# Patient Record
Sex: MALE | Race: WHITE | NOT HISPANIC OR LATINO | Employment: OTHER | URBAN - METROPOLITAN AREA
[De-identification: names, ages, dates, MRNs, and addresses within clinical notes are randomized per-mention and may not be internally consistent; named-entity substitution may affect disease eponyms.]

---

## 2017-10-30 ENCOUNTER — PREP FOR SURGERY (OUTPATIENT)
Dept: OTHER | Facility: HOSPITAL | Age: 62
End: 2017-10-30

## 2017-10-30 DIAGNOSIS — Z12.11 SCREENING FOR COLON CANCER: Primary | ICD-10-CM

## 2017-10-30 DIAGNOSIS — Z80.0 FAMILY HISTORY OF COLON CANCER: ICD-10-CM

## 2017-11-06 PROBLEM — Z80.0 FAMILY HISTORY OF COLON CANCER: Status: ACTIVE | Noted: 2017-11-06

## 2017-11-06 PROBLEM — Z12.11 SCREENING FOR COLON CANCER: Status: ACTIVE | Noted: 2017-11-06

## 2017-11-20 ENCOUNTER — HOSPITAL ENCOUNTER (OUTPATIENT)
Facility: HOSPITAL | Age: 62
Setting detail: HOSPITAL OUTPATIENT SURGERY
Discharge: HOME | End: 2017-11-20
Attending: SURGERY | Admitting: SURGERY

## 2017-11-20 ENCOUNTER — ANESTHESIA EVENT (OUTPATIENT)
Dept: GASTROENTEROLOGY | Facility: HOSPITAL | Age: 62
End: 2017-11-20

## 2017-11-20 ENCOUNTER — ANESTHESIA (OUTPATIENT)
Dept: GASTROENTEROLOGY | Facility: HOSPITAL | Age: 62
End: 2017-11-20

## 2017-11-20 VITALS
TEMPERATURE: 98.1 F | OXYGEN SATURATION: 96 % | DIASTOLIC BLOOD PRESSURE: 54 MMHG | RESPIRATION RATE: 16 BRPM | BODY MASS INDEX: 44.57 KG/M2 | HEIGHT: 67 IN | HEART RATE: 76 BPM | SYSTOLIC BLOOD PRESSURE: 116 MMHG | WEIGHT: 284 LBS

## 2017-11-20 DIAGNOSIS — Z12.11 SCREENING FOR COLON CANCER: ICD-10-CM

## 2017-11-20 DIAGNOSIS — Z80.0 FAMILY HISTORY OF COLON CANCER: ICD-10-CM

## 2017-11-20 PROCEDURE — 45380 COLONOSCOPY AND BIOPSY: CPT | Performed by: SURGERY

## 2017-11-20 PROCEDURE — 25010000002 PROPOFOL 10 MG/ML EMULSION: Performed by: ANESTHESIOLOGY

## 2017-11-20 PROCEDURE — S0260 H&P FOR SURGERY: HCPCS | Performed by: SURGERY

## 2017-11-20 PROCEDURE — 88305 TISSUE EXAM BY PATHOLOGIST: CPT | Performed by: SURGERY

## 2017-11-20 PROCEDURE — 25010000002 PROPOFOL 1000 MG/ML EMULSION: Performed by: ANESTHESIOLOGY

## 2017-11-20 RX ORDER — LIDOCAINE HYDROCHLORIDE 20 MG/ML
INJECTION, SOLUTION INFILTRATION; PERINEURAL AS NEEDED
Status: DISCONTINUED | OUTPATIENT
Start: 2017-11-20 | End: 2017-11-20 | Stop reason: SURG

## 2017-11-20 RX ORDER — AMLODIPINE BESYLATE 10 MG/1
10 TABLET ORAL DAILY
COMMUNITY
End: 2021-07-30 | Stop reason: SDUPTHER

## 2017-11-20 RX ORDER — SODIUM CHLORIDE, SODIUM LACTATE, POTASSIUM CHLORIDE, CALCIUM CHLORIDE 600; 310; 30; 20 MG/100ML; MG/100ML; MG/100ML; MG/100ML
30 INJECTION, SOLUTION INTRAVENOUS CONTINUOUS PRN
Status: DISCONTINUED | OUTPATIENT
Start: 2017-11-20 | End: 2017-11-20 | Stop reason: HOSPADM

## 2017-11-20 RX ORDER — SODIUM PHOSPHATE,MONO-DIBASIC 19G-7G/118
ENEMA (ML) RECTAL 2 TIMES DAILY WITH MEALS
COMMUNITY

## 2017-11-20 RX ORDER — DICLOFENAC SODIUM 75 MG/1
75 TABLET, DELAYED RELEASE ORAL 2 TIMES DAILY
COMMUNITY
End: 2021-07-07

## 2017-11-20 RX ORDER — PROPOFOL 10 MG/ML
VIAL (ML) INTRAVENOUS AS NEEDED
Status: DISCONTINUED | OUTPATIENT
Start: 2017-11-20 | End: 2017-11-20 | Stop reason: SURG

## 2017-11-20 RX ORDER — HYDROCHLOROTHIAZIDE 25 MG/1
25 TABLET ORAL DAILY
COMMUNITY
End: 2021-07-30 | Stop reason: SDUPTHER

## 2017-11-20 RX ADMIN — LIDOCAINE HYDROCHLORIDE 50 MG: 20 INJECTION, SOLUTION INFILTRATION; PERINEURAL at 12:22

## 2017-11-20 RX ADMIN — PROPOFOL 140 MCG/KG/MIN: 10 INJECTION, EMULSION INTRAVENOUS at 12:22

## 2017-11-20 RX ADMIN — SODIUM CHLORIDE, POTASSIUM CHLORIDE, SODIUM LACTATE AND CALCIUM CHLORIDE 30 ML/HR: 600; 310; 30; 20 INJECTION, SOLUTION INTRAVENOUS at 11:49

## 2017-11-20 RX ADMIN — PROPOFOL 180 MG: 10 INJECTION, EMULSION INTRAVENOUS at 12:22

## 2017-11-20 NOTE — PLAN OF CARE
Problem: Patient Care Overview (Adult)  Goal: Plan of Care Review  Outcome: Ongoing (interventions implemented as appropriate)    11/20/17 1130   Coping/Psychosocial Response Interventions   Plan Of Care Reviewed With patient   Patient Care Overview   Progress no change       Goal: Adult Individualization and Mutuality  Outcome: Ongoing (interventions implemented as appropriate)  Goal: Discharge Needs Assessment  Outcome: Ongoing (interventions implemented as appropriate)    11/20/17 1130   Discharge Needs Assessment   Concerns To Be Addressed no discharge needs identified   Discharge Disposition home or self-care   Living Environment   Transportation Available car         Problem: GI Endoscopy (Adult)  Goal: Signs and Symptoms of Listed Potential Problems Will be Absent or Manageable (GI Endoscopy)  Outcome: Ongoing (interventions implemented as appropriate)    11/20/17 1130   GI Endoscopy   Problems Assessed (GI Endoscopy) all   Problems Present (GI Endoscopy) none

## 2017-11-20 NOTE — OP NOTE
Operative Note :  Bridgette Jefferson MD      Andrea Diaz  1955    Procedure Date: 11/20/17    Pre-op Diagnosis:  · Family history of colon cancer [Z80.0]  · Screening for colon cancer [Z12.11]    Post-Operative Diagnosis:  · Rectal polyp  · Grade 1 internal hemorrhoids  · Family history of colon cancer [Z80.0]  · Screening for colon cancer [Z12.11]    Procedure:   · Flexible colonoscopy to the cecum with cold forcep polypectomy    Surgeon: Bridgette Jefferson MD    Assistant: None    Anesthesia:  MAC (monitored anesthetic care)    Estimated Blood Loss: Minimal    Specimens: Rectal polyp    Complications: None    Indications:  · Mr. Diaz is a 62-year-old gentleman here for a repeat screening colonoscopy.  His last colonoscopy was 10 years ago and within normal limits, but he does have a family history of colon cancer in his paternal uncle.  He has been counseled on the risks of the procedure to include bleeding from polypectomy site, abdominal discomfort following the procedure, and possible colon perforation.  Despite these risks, he has consented to proceed.    Findings:   · Grade I internal hemorrhoids, rectal polyp    Description of procedure:  The patient was brought to the endoscopy suite and cielo in the left lateral decubitus position.  Continuous propofol anesthesia was administered.  A surgical timeout was completed.  A digital rectal exam was performed, revealing no abnormalities.  An adult colonoscope was then inserted through the anus and passed under direct visualization to the level of the cecum.  The cecum was identified via the ileocecal valve as well as the appendiceal orifice.  The scope was then slowly withdrawn, examining all circumferential walls of the ascending, transverse, descending, and sigmoid colon.  There were no signs of diverticulosis or stricture.  There was a single sessile polyp measuring 5 mm located within the upper portion of the rectum at 25 cm from anal verge.  This was  removed with the cold biopsy forceps.  Within the rectum, the scope was retroflexed revealing grade 1 internal hemorrhoids.  The scope was then withdrawn and the colon desufflated.  The patient had a very good bowel prep and was transferred to the recovery area in stable condition. I will call him in 1 week with the pathology results of the single polyp removed, as this will dictate the appropriate timing for his next screening colonoscopy.    Bridgette Jefferson MD  General and Endoscopic Surgery  [facility]    [address], [address]  [place], [place], [address]  P: [phone]  F: [phone]

## 2017-11-20 NOTE — ANESTHESIA POSTPROCEDURE EVALUATION
"Patient: Andrea Diaz    Procedure Summary     Date Anesthesia Start Anesthesia Stop Room / Location    11/20/17 1205 1241  [facility] /  [facility]       Procedure Diagnosis Surgeon Provider    COLONOSCOPY TO CECUM WITH COLD POLYPECTOMY (N/A ) Family history of colon cancer; Screening for colon cancer  (Family history of colon cancer [Z80.0]; Screening for colon cancer [Z12.11]) MD Lilo Marte MD          Anesthesia Type: MAC  Last vitals  BP   120/77 (11/20/17 1243)   Temp   36.6 °C (97.8 °F) (11/20/17 1146)   Pulse   90 (11/20/17 1243)   Resp   16 (11/20/17 1243)     SpO2   99 % (11/20/17 1243)     Post Anesthesia Care and Evaluation    Patient location during evaluation: bedside  Patient participation: complete - patient participated  Level of consciousness: awake and alert  Pain management: adequate  Airway patency: patent  Anesthetic complications: No anesthetic complications  PONV Status: none  Cardiovascular status: acceptable  Respiratory status: acceptable  Hydration status: acceptable    Comments: /77 (BP Location: Left arm, Patient Position: Lying)  Pulse 90  Temp 36.6 °C (97.8 °F) (Oral)   Resp 16  Ht 67\" (170.2 cm)  Wt 284 lb (129 kg)  SpO2 99%  BMI 44.48 kg/m2        "

## 2017-11-20 NOTE — H&P
General Surgery  History and Physical    CC: Screening for colon cancer, family history of colon cancer    HPI: The patient is a pleasant 62 y.o. year-old gentleman who presents today for a repeat screening colonoscopy.  His last colonoscopy was in 2007 by Dr. Dewitt, and within normal limits.  He has a paternal uncle with colon cancer diagnosed in his early 50's and a father with pancreatic cancer.  He denies any melena, hematochezia, or unintentional weight loss.    Past Medical History: Hypertension, arthritis    Past Surgical History: Bilateral knee replacements, colonoscopy (2007)    Medications:   Hydrochlorthiazide 25 mg daily  Amlodipine 10 mg daily  Diclofenac 75 g twice daily  Loratadine 10 mg daily  Glucosamine with chondroitin twice daily  Vitamin C 500 mg daily    Allergies: No known drug allergies    Family History: Father with pancreatic cancer, paternal uncle with colon cancer (diagnosed in early 50's), brother with history of colon polyps    Social History: , works as an  for [employer], nonsmoker, no alcohol use    ROS: A comprehensive review of systems was conducted and negative for melena, hematochezia, or unintentional weight loss.  All other systems reviewed and negative    Physical Exam:  Vitals:    11/20/17 1146   BP: 129/96   Pulse: 88   Resp: 12   Temp: 97.8 °F (36.6 °C)   SpO2: 95%     General: No acute distress, well-nourished & well-developed  HEAD: normocephalic, atraumatic  EYES: normal conjunctiva, sclera anicteric  EARS: grossly normal hearing  NECK: supple, no thyromegaly  CARDIOVASCULAR: regular rate and rhythm  RESPIRATORY: clear to auscultation bilaterally  GASTROINTESTINAL: soft, nontender, non-distended  PSYCHIATRIC: oriented x3, normal mood and affect    ASSESSMENT & PLAN  Mr. Diaz is a 62-year-old gentleman here for a repeat screening colonoscopy.  His last colonoscopy was 10 years ago and within normal limits, but he does have a family history  of colon cancer in his paternal uncle.  He has been counseled on the risks of the procedure to include bleeding from polypectomy site, abdominal discomfort following the procedure, and possible colon perforation.  Despite these risks, he has consented to proceed.    Bridgette Jefferson MD  General and Endoscopic Surgery  [facility]    [address], [address]  [place], [place], [address]  P: [phone]  F: [phone]

## 2017-11-20 NOTE — ANESTHESIA PREPROCEDURE EVALUATION
Anesthesia Evaluation     Patient summary reviewed   NPO Solid Status: > 8 hours  NPO Liquid Status: > 6 hours     Airway   Mallampati: II  TM distance: >3 FB  Dental      Pulmonary    Cardiovascular     Rhythm: regular  Rate: normal    (+) hypertension,       Neuro/Psych  GI/Hepatic/Renal/Endo    (+) morbid obesity,     Musculoskeletal     Abdominal    Substance History      OB/GYN          Other                                        Anesthesia Plan    ASA 3     MAC   total IV anesthesia  Anesthetic plan and risks discussed with patient.

## 2017-11-21 LAB
CYTO UR: NORMAL
LAB AP CASE REPORT: NORMAL
Lab: NORMAL
PATH REPORT.FINAL DX SPEC: NORMAL
PATH REPORT.GROSS SPEC: NORMAL

## 2017-11-22 ENCOUNTER — TELEPHONE (OUTPATIENT)
Dept: SURGERY | Facility: CLINIC | Age: 62
End: 2017-11-22

## 2017-11-22 NOTE — TELEPHONE ENCOUNTER
I called and relayed the pathology findings of a single hyperplastic polyp on colonoscopy. His next screening colonoscopy will be due in 10 years. He expressed understanding. Marilu, can you update the Health Maintenance tab and recall pool? Thanks.    Bridgette Jefferson MD  General and Endoscopic Surgery  [facility]    [address], [address]  [place], [place], [address]  P: [phone]  F: [phone]

## 2018-01-06 ENCOUNTER — OFFICE VISIT CONVERTED (OUTPATIENT)
Dept: FAMILY MEDICINE CLINIC | Age: 63
End: 2018-01-06
Attending: NURSE PRACTITIONER

## 2018-03-20 ENCOUNTER — OFFICE VISIT CONVERTED (OUTPATIENT)
Dept: FAMILY MEDICINE CLINIC | Age: 63
End: 2018-03-20
Attending: NURSE PRACTITIONER

## 2018-06-21 ENCOUNTER — OFFICE VISIT CONVERTED (OUTPATIENT)
Dept: FAMILY MEDICINE CLINIC | Age: 63
End: 2018-06-21
Attending: FAMILY MEDICINE

## 2018-07-10 ENCOUNTER — OFFICE VISIT CONVERTED (OUTPATIENT)
Dept: FAMILY MEDICINE CLINIC | Age: 63
End: 2018-07-10
Attending: NURSE PRACTITIONER

## 2018-08-21 ENCOUNTER — OFFICE VISIT CONVERTED (OUTPATIENT)
Dept: FAMILY MEDICINE CLINIC | Age: 63
End: 2018-08-21
Attending: NURSE PRACTITIONER

## 2018-10-26 ENCOUNTER — OFFICE VISIT CONVERTED (OUTPATIENT)
Dept: FAMILY MEDICINE CLINIC | Age: 63
End: 2018-10-26
Attending: FAMILY MEDICINE

## 2019-02-02 ENCOUNTER — HOSPITAL ENCOUNTER (OUTPATIENT)
Dept: OTHER | Facility: HOSPITAL | Age: 64
Discharge: HOME | End: 2019-02-02
Attending: FAMILY MEDICINE

## 2019-02-02 LAB
ALBUMIN SERPL-MCNC: 4.1 G/DL (ref 3.5–5)
ALBUMIN/GLOB SERPL: 1.2 {RATIO} (ref 1.4–2.6)
ALP SERPL-CCNC: 76 U/L (ref 56–155)
ALT SERPL-CCNC: 32 U/L (ref 10–40)
ANION GAP SERPL CALC-SCNC: 20 MMOL/L (ref 8–19)
AST SERPL-CCNC: 20 U/L (ref 15–50)
BILIRUB SERPL-MCNC: 0.61 MG/DL (ref 0.2–1.3)
BUN SERPL-MCNC: 18 MG/DL (ref 5–25)
BUN/CREAT SERPL: 17 {RATIO} (ref 6–20)
CALCIUM SERPL-MCNC: 9.3 MG/DL (ref 8.7–10.4)
CHLORIDE SERPL-SCNC: 103 MMOL/L (ref 99–111)
CHOLEST SERPL-MCNC: 129 MG/DL (ref 107–200)
CHOLEST/HDLC SERPL: 3.7 {RATIO} (ref 3–6)
CONV CO2: 22 MMOL/L (ref 22–32)
CONV CREATININE URINE, RANDOM: 99.9 MG/DL (ref 10–300)
CONV MICROALBUM.,U,RANDOM: <12 MG/L (ref 0–20)
CONV TOTAL PROTEIN: 7.4 G/DL (ref 6.3–8.2)
CREAT UR-MCNC: 1.04 MG/DL (ref 0.7–1.2)
ERYTHROCYTE [DISTWIDTH] IN BLOOD BY AUTOMATED COUNT: 11.6 % (ref 11.5–14.5)
EST. AVERAGE GLUCOSE BLD GHB EST-MCNC: 131 MG/DL
GFR SERPLBLD BASED ON 1.73 SQ M-ARVRAT: >60 ML/MIN/{1.73_M2}
GLOBULIN UR ELPH-MCNC: 3.3 G/DL (ref 2–3.5)
GLUCOSE SERPL-MCNC: 120 MG/DL (ref 70–99)
HBA1C MFR BLD: 14.7 G/DL (ref 14–18)
HBA1C MFR BLD: 6.2 % (ref 3.5–5.7)
HCT VFR BLD AUTO: 41.5 % (ref 42–52)
HDLC SERPL-MCNC: 35 MG/DL (ref 40–60)
LDLC SERPL CALC-MCNC: 82 MG/DL (ref 70–100)
MCH RBC QN AUTO: 30.8 PG (ref 27–31)
MCHC RBC AUTO-ENTMCNC: 35.4 G/DL (ref 33–37)
MCV RBC AUTO: 86.8 FL (ref 80–96)
MICROALBUMIN/CREAT UR: 12 MG/G{CRE} (ref 0–25)
OSMOLALITY SERPL CALC.SUM OF ELEC: 295 MOSM/KG (ref 273–304)
PLATELET # BLD AUTO: 215 10*3/UL (ref 130–400)
PMV BLD AUTO: 10.2 FL (ref 7.4–10.4)
POTASSIUM SERPL-SCNC: 3.9 MMOL/L (ref 3.5–5.3)
PSA SERPL-MCNC: 0.43 NG/ML (ref 0–4)
RBC # BLD AUTO: 4.78 10*6/UL (ref 4.7–6.1)
SODIUM SERPL-SCNC: 141 MMOL/L (ref 135–147)
TRIGL SERPL-MCNC: 62 MG/DL (ref 40–150)
TSH SERPL-ACNC: 1.63 M[IU]/L (ref 0.27–4.2)
VLDLC SERPL-MCNC: 12 MG/DL (ref 5–37)
WBC # BLD AUTO: 4.88 10*3/UL (ref 4.8–10.8)

## 2019-02-19 ENCOUNTER — OFFICE VISIT CONVERTED (OUTPATIENT)
Dept: FAMILY MEDICINE CLINIC | Age: 64
End: 2019-02-19
Attending: FAMILY MEDICINE

## 2019-10-01 ENCOUNTER — HOSPITAL ENCOUNTER (OUTPATIENT)
Dept: OTHER | Facility: HOSPITAL | Age: 64
Discharge: HOME | End: 2019-10-01
Attending: FAMILY MEDICINE

## 2019-10-01 ENCOUNTER — OFFICE VISIT CONVERTED (OUTPATIENT)
Dept: FAMILY MEDICINE CLINIC | Age: 64
End: 2019-10-01
Attending: FAMILY MEDICINE

## 2019-10-01 LAB
ALBUMIN SERPL-MCNC: 4.2 G/DL (ref 3.5–5)
ALBUMIN/GLOB SERPL: 1.3 {RATIO} (ref 1.4–2.6)
ALP SERPL-CCNC: 78 U/L (ref 56–155)
ALT SERPL-CCNC: 32 U/L (ref 10–40)
ANION GAP SERPL CALC-SCNC: 20 MMOL/L (ref 8–19)
AST SERPL-CCNC: 21 U/L (ref 15–50)
BILIRUB SERPL-MCNC: 0.32 MG/DL (ref 0.2–1.3)
BUN SERPL-MCNC: 24 MG/DL (ref 5–25)
BUN/CREAT SERPL: 19 {RATIO} (ref 6–20)
CALCIUM SERPL-MCNC: 9.5 MG/DL (ref 8.7–10.4)
CHLORIDE SERPL-SCNC: 101 MMOL/L (ref 99–111)
CONV CO2: 22 MMOL/L (ref 22–32)
CONV TOTAL PROTEIN: 7.4 G/DL (ref 6.3–8.2)
CREAT UR-MCNC: 1.28 MG/DL (ref 0.7–1.2)
EST. AVERAGE GLUCOSE BLD GHB EST-MCNC: 146 MG/DL
GFR SERPLBLD BASED ON 1.73 SQ M-ARVRAT: 58 ML/MIN/{1.73_M2}
GLOBULIN UR ELPH-MCNC: 3.2 G/DL (ref 2–3.5)
GLUCOSE SERPL-MCNC: 123 MG/DL (ref 70–99)
HBA1C MFR BLD: 6.7 % (ref 3.5–5.7)
OSMOLALITY SERPL CALC.SUM OF ELEC: 293 MOSM/KG (ref 273–304)
POTASSIUM SERPL-SCNC: 3.8 MMOL/L (ref 3.5–5.3)
SODIUM SERPL-SCNC: 139 MMOL/L (ref 135–147)

## 2020-01-06 ENCOUNTER — HOSPITAL ENCOUNTER (OUTPATIENT)
Dept: OTHER | Facility: HOSPITAL | Age: 65
Discharge: HOME | End: 2020-01-06
Attending: FAMILY MEDICINE

## 2020-01-06 LAB
ANION GAP SERPL CALC-SCNC: 17 MMOL/L (ref 8–19)
BUN SERPL-MCNC: 18 MG/DL (ref 5–25)
BUN/CREAT SERPL: 16 {RATIO} (ref 6–20)
CALCIUM SERPL-MCNC: 9 MG/DL (ref 8.7–10.4)
CHLORIDE SERPL-SCNC: 101 MMOL/L (ref 99–111)
CONV CO2: 22 MMOL/L (ref 22–32)
CREAT UR-MCNC: 1.13 MG/DL (ref 0.7–1.2)
EST. AVERAGE GLUCOSE BLD GHB EST-MCNC: 148 MG/DL
GFR SERPLBLD BASED ON 1.73 SQ M-ARVRAT: >60 ML/MIN/{1.73_M2}
GLUCOSE SERPL-MCNC: 128 MG/DL (ref 70–99)
HBA1C MFR BLD: 6.8 % (ref 3.5–5.7)
OSMOLALITY SERPL CALC.SUM OF ELEC: 286 MOSM/KG (ref 273–304)
POTASSIUM SERPL-SCNC: 3.9 MMOL/L (ref 3.5–5.3)
SODIUM SERPL-SCNC: 136 MMOL/L (ref 135–147)

## 2020-04-14 ENCOUNTER — OFFICE VISIT CONVERTED (OUTPATIENT)
Dept: FAMILY MEDICINE CLINIC | Age: 65
End: 2020-04-14
Attending: FAMILY MEDICINE

## 2020-06-10 ENCOUNTER — HOSPITAL ENCOUNTER (OUTPATIENT)
Dept: OTHER | Facility: HOSPITAL | Age: 65
Discharge: HOME | End: 2020-06-10
Attending: FAMILY MEDICINE

## 2020-06-10 LAB
ALBUMIN SERPL-MCNC: 3.9 G/DL (ref 3.5–5)
ALBUMIN/GLOB SERPL: 1.3 {RATIO} (ref 1.4–2.6)
ALP SERPL-CCNC: 70 U/L (ref 56–155)
ALT SERPL-CCNC: 23 U/L (ref 10–40)
ANION GAP SERPL CALC-SCNC: 21 MMOL/L (ref 8–19)
AST SERPL-CCNC: 16 U/L (ref 15–50)
BILIRUB SERPL-MCNC: 0.53 MG/DL (ref 0.2–1.3)
BUN SERPL-MCNC: 20 MG/DL (ref 5–25)
BUN/CREAT SERPL: 18 {RATIO} (ref 6–20)
CALCIUM SERPL-MCNC: 9.1 MG/DL (ref 8.7–10.4)
CHLORIDE SERPL-SCNC: 103 MMOL/L (ref 99–111)
CHOLEST SERPL-MCNC: 116 MG/DL (ref 107–200)
CHOLEST/HDLC SERPL: 3.4 {RATIO} (ref 3–6)
CONV CO2: 18 MMOL/L (ref 22–32)
CONV TOTAL PROTEIN: 7 G/DL (ref 6.3–8.2)
CREAT UR-MCNC: 1.14 MG/DL (ref 0.7–1.2)
ERYTHROCYTE [DISTWIDTH] IN BLOOD BY AUTOMATED COUNT: 12 % (ref 11.5–14.5)
GFR SERPLBLD BASED ON 1.73 SQ M-ARVRAT: >60 ML/MIN/{1.73_M2}
GLOBULIN UR ELPH-MCNC: 3.1 G/DL (ref 2–3.5)
GLUCOSE SERPL-MCNC: 222 MG/DL (ref 70–99)
HBA1C MFR BLD: 14.5 G/DL (ref 14–18)
HCT VFR BLD AUTO: 41.1 % (ref 42–52)
HDLC SERPL-MCNC: 34 MG/DL (ref 40–60)
LDLC SERPL CALC-MCNC: 57 MG/DL (ref 70–100)
MCH RBC QN AUTO: 30.1 PG (ref 27–31)
MCHC RBC AUTO-ENTMCNC: 35.3 G/DL (ref 33–37)
MCV RBC AUTO: 85.4 FL (ref 80–96)
OSMOLALITY SERPL CALC.SUM OF ELEC: 295 MOSM/KG (ref 273–304)
PLATELET # BLD AUTO: 214 10*3/UL (ref 130–400)
PMV BLD AUTO: 10.4 FL (ref 7.4–10.4)
POTASSIUM SERPL-SCNC: 3.7 MMOL/L (ref 3.5–5.3)
PSA SERPL-MCNC: 0.5 NG/ML (ref 0–4)
RBC # BLD AUTO: 4.81 10*6/UL (ref 4.7–6.1)
SODIUM SERPL-SCNC: 138 MMOL/L (ref 135–147)
TRIGL SERPL-MCNC: 124 MG/DL (ref 40–150)
VLDLC SERPL-MCNC: 25 MG/DL (ref 5–37)
WBC # BLD AUTO: 5.46 10*3/UL (ref 4.8–10.8)

## 2020-06-11 LAB
EST. AVERAGE GLUCOSE BLD GHB EST-MCNC: 166 MG/DL
HBA1C MFR BLD: 7.4 % (ref 3.5–5.7)

## 2020-06-15 ENCOUNTER — OFFICE VISIT CONVERTED (OUTPATIENT)
Dept: FAMILY MEDICINE CLINIC | Age: 65
End: 2020-06-15
Attending: FAMILY MEDICINE

## 2021-01-05 ENCOUNTER — OFFICE VISIT CONVERTED (OUTPATIENT)
Dept: FAMILY MEDICINE CLINIC | Age: 66
End: 2021-01-05
Attending: FAMILY MEDICINE

## 2021-01-05 ENCOUNTER — HOSPITAL ENCOUNTER (OUTPATIENT)
Dept: OTHER | Facility: HOSPITAL | Age: 66
Discharge: HOME | End: 2021-01-05
Attending: FAMILY MEDICINE

## 2021-01-05 LAB
ALBUMIN SERPL-MCNC: 4.2 G/DL (ref 3.5–5)
ALBUMIN/GLOB SERPL: 1.2 {RATIO} (ref 1.4–2.6)
ALP SERPL-CCNC: 74 U/L (ref 56–155)
ALT SERPL-CCNC: 28 U/L (ref 10–40)
ANION GAP SERPL CALC-SCNC: 15 MMOL/L (ref 8–19)
AST SERPL-CCNC: 17 U/L (ref 15–50)
BILIRUB SERPL-MCNC: 0.66 MG/DL (ref 0.2–1.3)
BUN SERPL-MCNC: 21 MG/DL (ref 5–25)
BUN/CREAT SERPL: 19 {RATIO} (ref 6–20)
CALCIUM SERPL-MCNC: 9.5 MG/DL (ref 8.7–10.4)
CHLORIDE SERPL-SCNC: 98 MMOL/L (ref 99–111)
CHOLEST SERPL-MCNC: 136 MG/DL (ref 107–200)
CHOLEST/HDLC SERPL: 3.7 {RATIO} (ref 3–6)
CONV CO2: 26 MMOL/L (ref 22–32)
CONV CREATININE URINE, RANDOM: 67.3 MG/DL (ref 10–300)
CONV MICROALBUM.,U,RANDOM: <12 MG/L (ref 0–20)
CONV TOTAL PROTEIN: 7.6 G/DL (ref 6.3–8.2)
CREAT UR-MCNC: 1.12 MG/DL (ref 0.7–1.2)
ERYTHROCYTE [DISTWIDTH] IN BLOOD BY AUTOMATED COUNT: 11.8 % (ref 11.5–14.5)
EST. AVERAGE GLUCOSE BLD GHB EST-MCNC: 143 MG/DL
GFR SERPLBLD BASED ON 1.73 SQ M-ARVRAT: >60 ML/MIN/{1.73_M2}
GLOBULIN UR ELPH-MCNC: 3.4 G/DL (ref 2–3.5)
GLUCOSE SERPL-MCNC: 137 MG/DL (ref 70–99)
HBA1C MFR BLD: 15 G/DL (ref 14–18)
HBA1C MFR BLD: 6.6 % (ref 3.5–5.7)
HCT VFR BLD AUTO: 42.8 % (ref 42–52)
HDLC SERPL-MCNC: 37 MG/DL (ref 40–60)
LDLC SERPL CALC-MCNC: 75 MG/DL (ref 70–100)
MCH RBC QN AUTO: 30.3 PG (ref 27–31)
MCHC RBC AUTO-ENTMCNC: 35 G/DL (ref 33–37)
MCV RBC AUTO: 86.5 FL (ref 80–96)
MICROALBUMIN/CREAT UR: 17.8 MG/G{CRE} (ref 0–25)
OSMOLALITY SERPL CALC.SUM OF ELEC: 285 MOSM/KG (ref 273–304)
PLATELET # BLD AUTO: 265 10*3/UL (ref 130–400)
PMV BLD AUTO: 10.4 FL (ref 7.4–10.4)
POTASSIUM SERPL-SCNC: 4.4 MMOL/L (ref 3.5–5.3)
RBC # BLD AUTO: 4.95 10*6/UL (ref 4.7–6.1)
SODIUM SERPL-SCNC: 135 MMOL/L (ref 135–147)
TRIGL SERPL-MCNC: 121 MG/DL (ref 40–150)
VLDLC SERPL-MCNC: 24 MG/DL (ref 5–37)
WBC # BLD AUTO: 6.88 10*3/UL (ref 4.8–10.8)

## 2021-05-18 NOTE — PROGRESS NOTES
Andrea Diaz  1955     Office/Outpatient Visit    Visit Date: Tue, Apr 14, 2020 09:22 am    Provider: Velasquez Daniel MD (Assistant: Jacqueline Singh MA)    Location: [facility]        Electronically signed by Velasquez Daniel MD on  04/15/2020 11:06:40 AM                             Subjective:        CC: blood pressure, cholesterol, arthritis        TELEMEDICINE VISIT:    - Marcelino consented to this telemedicine visit.    - Persons present during the telemedicine consultation include:  Marcelino - patient, Dr. Daniel    - This visit is being conducted over Doximity  with audio and video.    HPI:           Patient presents with essential (primary) hypertension.  His current cardiac medication regimen includes a diuretic ( Hydrodiuril ) and a calcium channel blocker ( Norvasc ).  Marcelino does not check his blood pressure other than at his clinic appointments.  He is tolerating the medication well without side effects.            Marcelino also has a history of arthritis for which he takes diclofenac.  He does see benefit from this and has not had worrisome side effects of which he is aware.          With regard to the pure hypercholesterolemia, unspecified, current treatment includes Lipitor.  Compliance with treatment has been good; he takes his medication as directed and follows up as directed.  He denies experiencing any hypercholesterolemia related symptoms.      ROS:     CONSTITUTIONAL:  Negative for chills and fever.      CARDIOVASCULAR:  Negative for chest pain and palpitations.      RESPIRATORY:  Negative for recent cough and dyspnea.      GASTROINTESTINAL:  Negative for abdominal pain, nausea and vomiting.      INTEGUMENTARY:  Negative for atypical mole(s) and rash.          Past Medical History / Family History / Social History:         Last Reviewed on 4/14/2020 09:58 AM by Velasquez Daniel    Past Medical History:             PAST MEDICAL HISTORY         Hypertension      Chronic Pain: affecting the knees;     Osteoarthritis: primarily affecting the knees;             ADVANCED DIRECTIVES: None - His children would make decisions if needed.         PREVENTIVE HEALTH MAINTENANCE             COLORECTAL CANCER SCREENING: Up to date (colonoscopy q10y; sigmoidoscopy q5y; Cologuard q3y) was last done 11/2017, Results are in chart; colonoscopy with normal results; hyperplastic polyp         Surgical History:         Joint Replacement: R Knee - August 2012 - Miles Leland;     R Knee - Meniscus;    L Knee arthroplasty 2-2015;         Family History:         Positive for Coronary Artery Disease ( mother ).      Positive for Colon Cancer ( uncle ) and Pancreatric Cancer ( father ).      Positive for Type 2 Diabetes ( father ).          Social History:     Occupation:    Retired     Marital Status:      Children: 2 children         Tobacco/Alcohol/Supplements:     Last Reviewed on 4/14/2020 09:58 AM by Velasquez Daniel    Tobacco: He has never smoked.  Non-drinker         Substance Abuse History:     Last Reviewed on 4/14/2020 09:58 AM by Velasquez Daniel        Mental Health History:     Last Reviewed on 4/14/2020 09:58 AM by Velasquez Daniel        Communicable Diseases (eg STDs):     Last Reviewed on 4/14/2020 09:58 AM by Velasquez Daniel        Current Problems:     Last Reviewed on 4/14/2020 09:58 AM by Velasquez Daniel    Essential (primary) hypertension    Unilateral primary osteoarthritis, left knee    Primary generalized (osteo)arthritis    Type 2 diabetes mellitus without complications    Pure hypercholesterolemia, unspecified    Encounter for screening for malignant neoplasm of prostate        Immunizations:     Havrix -adult dose (HepA) 6/11/2018    Havrix -adult dose (HepA) 1/2/2019    Prevnar 13 (Pneumococcal PCV 13) 10/26/2018    Fluzone High-Dose pf (>=65 yr) 10/26/2018    Fluzone High-Dose pf (>=65 yr) 10/1/2019    PNEUMOVAX 23  (Pneumococcal PPV23) 10/1/2019    Shingrix (Zoster recombinant) 6/9/2018    Shingrix (Zoster recombinant) 8/29/2018    Zostavax (Zoster live) 10/9/2013        Allergies:     Last Reviewed on 4/14/2020 09:58 AM by Velasquez Daniel    No Known Allergies.        Current Medications:     Last Reviewed on 4/14/2020 09:58 AM by Velasquez Daniel    Loratadine 10 mg oral tablet [1 tab daily]    Diclofenac Sodium 75 mg oral tablet, delayed release (enteric coated) [Take 1 tablet(s) by mouth bid with food]    Amlodipine  10 mg oral tablet [Take 1 tablet(s) by mouth daily]    hydroCHLOROthiazide 25 mg oral tablet [Take 1 tablet(s) by mouth daily]    glucosamine HCl 1,500 mg oral tablet [Take 1 tablet(s) by mouth daily]    Ibuprofen prn     Vitamin C 500 mg oral tablet [1 tab daily]    atorvastatin 10 mg oral tablet [TAKE 1 TABLET BY MOUTH DAILY]        Objective:        Exams:     PHYSICAL EXAM:     GENERAL: Vitals recorded well developed, obese;     EYES: extraocular movements intact; conjunctiva and cornea are normal;     RESPIRATORY: normal respiratory rate and pattern with no distress;     NEUROLOGIC: mental status: alert and oriented x 3;     PSYCHIATRIC: appropriate affect and demeanor; normal psychomotor function;         Assessment:         I10   Essential (primary) hypertension       M15.0   Primary generalized (osteo)arthritis       E78.00   Pure hypercholesterolemia, unspecified       Z12.5   Encounter for screening for malignant neoplasm of prostate           ORDERS:         Meds Prescribed:       [Refilled] Amlodipine  10 mg oral tablet [Take 1 tablet(s) by mouth daily], #90 (ninety) tablets, Refills: 1 (one)       [Refilled] Diclofenac Sodium 75 mg oral tablet, delayed release (enteric coated) [Take 1 tablet(s) by mouth bid with food], #180 (one hundred and eighty) tablets, Refills: 1 (one)       [Refilled] hydroCHLOROthiazide 25 mg oral tablet [Take 1 tablet(s) by mouth daily], #90 (ninety) tablets,  Refills: 1 (one)       [Refilled] atorvastatin 10 mg oral tablet [take 1 tablet (10 mg) by oral route once daily], #90 (ninety) tablets, Refills: 1 (one)       [New Rx] famotidine 20 mg oral tablet [take 1 tablet (20 mg) by oral route once daily at bedtime], #90 (ninety) tablets, Refills: 1 (one)         Radiology/Test Orders:       3017F  Colorectal CA screen results documented and reviewed (PV)  (In-House)              Lab Orders:       [id number]  HTNLP - [facility] CMP AND LIPID: [id number], [id number]  (Send-Out)            27555  PRSAS - [facility] PSA Screen or Medicare screening order:   (Send-Out)            [id number]  BDCB2 - [facility] CBC w/o diff  (Send-Out)              Other Orders:       1101F  Pt screen for fall risk; document no falls in past year or only 1 fall w/o injury in past year (PRINCESS)  (In-House)            1124F  Advance Care Planning discussed and doc in MR; no surrogate named or advance care plan provided  (Send-Out)                      Plan:         Essential (primary) hypertension        RECOMMENDATIONS given include: Marcelino looks well today.  I'm going to refill his medications as noted below.  No changes are anticipated.  We are going to put him on some Pepcid at bedtime.  We will call him in about 6 weeks for labs..  MIRNA Has had no falls or only one fall without injury in the past year Vaccines Flu and Pneumonia updated in Shot record Colorectal Cancer Screening is up to date and the results are in the chart Advance Directive/Surrogate Decision Maker discussed and pt declines to complete today           Orders:       1101F  Pt screen for fall risk; document no falls in past year or only 1 fall w/o injury in past year (PRINCESS)  (In-House)            3017F  Colorectal CA screen results documented and reviewed (PV)  (In-House)            1124F  Advance Care Planning discussed and doc in MR; no surrogate named or advance care plan provided  (Send-Out)              Primary generalized (osteo)arthritis    LABORATORY:  Labs  ordered to be performed today include CBC W/O DIFF.            Prescriptions:       [Refilled] Amlodipine  10 mg oral tablet [Take 1 tablet(s) by mouth daily], #90 (ninety) tablets, Refills: 1 (one)       [Refilled] Diclofenac Sodium 75 mg oral tablet, delayed release (enteric coated) [Take 1 tablet(s) by mouth bid with food], #180 (one hundred and eighty) tablets, Refills: 1 (one)       [Refilled] hydroCHLOROthiazide 25 mg oral tablet [Take 1 tablet(s) by mouth daily], #90 (ninety) tablets, Refills: 1 (one)       [Refilled] atorvastatin 10 mg oral tablet [take 1 tablet (10 mg) by oral route once daily], #90 (ninety) tablets, Refills: 1 (one)       [New Rx] famotidine 20 mg oral tablet [take 1 tablet (20 mg) by oral route once daily at bedtime], #90 (ninety) tablets, Refills: 1 (one)           Orders:       [id number]  CB - [facility] CBC w/o diff  (Send-Out)              Pure hypercholesterolemia, unspecified    LABORATORY:  Labs ordered to be performed today include HTN/Lipid Panel: CMP, Lipid.            Orders:       [id number]  [facility] [id number], [id number]  (Send-Out)              Encounter for screening for malignant neoplasm of prostate          Orders:       [id number]  [facility] PSA Screen or Medicare screening order:   (Send-Out)                  Charge Capture:         Primary Diagnosis:     I10  Essential (primary) hypertension           Orders:      [id number]  Office/outpatient visit; established patient, level 4  (In-House)            1101F  Pt screen for fall risk; document no falls in past year or only 1 fall w/o injury in past year (PRINCESS)  (In-House)            3017F  Colorectal CA screen results documented and reviewed (PV)  (In-House)              M15.0  Primary generalized (osteo)arthritis     E78.00  Pure hypercholesterolemia, unspecified     Z12.5  Encounter for screening for malignant neoplasm of prostate

## 2021-05-18 NOTE — PROGRESS NOTES
Andrea DiazSussy 1955     Office/Outpatient Visit    Visit Date: Fri, Oct 26, 2018 04:18 pm    Provider: Velasquez Daniel MD (Assistant: Trudi Hirsch RN)    Location: [facility]        Electronically signed by Velasquez Daniel MD on  10/27/2018 08:03:08 AM                             SUBJECTIVE:        CC: diabetes follow up         HPI:         Patient to be evaluated for type 2 diabetes.  Current meds include nothing.  He does not perform home blood glucose monitoring.  Most recent lab results include Hemoglobin A1c:  6.9 (%) (07/14/2018), LDL:  103 (mg/dL) (07/13/2018).  Marcelino has made significant changes to his diet in the last few months.  He has lost some weight - about 20 pounds.          Elevated cholesterol details; current treatment includes diet.  Compliance with treatment has been fair; he does not follow a diet and exercise regimen.          In regard to the hypertension, his current cardiac medication regimen includes a diuretic ( Hydrodiuril ) and a calcium channel blocker ( Norvasc ).  Marcelino does not check his blood pressure other than at his clinic appointments.  He is tolerating the medication well without side effects.      ROS:     CONSTITUTIONAL:  Negative for chills and fever.      CARDIOVASCULAR:  Negative for chest pain and palpitations.      RESPIRATORY:  Negative for recent cough and dyspnea.      GASTROINTESTINAL:  Negative for abdominal pain, nausea and vomiting.      INTEGUMENTARY:  Negative for atypical mole(s) and rash.          PMH/FMH/SH:     Last Reviewed on 10/26/2018 04:55 PM by Velasquez Daniel    Past Medical History:         Hypertension     Chronic Pain: affecting the knees;     Osteoarthritis: primarily affecting the knees;         PREVENTIVE HEALTH MAINTENANCE             COLORECTAL CANCER SCREENING: Up to date (colonoscopy q10y; sigmoidoscopy q5y; Cologuard q3y) was last done 11/2017, Results are in chart; colonoscopy with normal results;  hyperplastic polyp         Surgical History:         Joint Replacement: R Knee - August 2012 - [facility] Ha;      R Knee - Meniscus;    L Knee arthroplasty 2-2015;         Family History:         Positive for Coronary Artery Disease ( mother ).      Positive for Colon Cancer ( uncle ) and Pancreatric Cancer ( father ).      Positive for Type 2 Diabetes ( father ).          Social History:     Occupation: [employer]     Marital Status:      Children: 2 children         Tobacco/Alcohol/Supplements:     Last Reviewed on 10/26/2018 04:55 PM by Velasquez Daniel    Tobacco: He has never smoked.  Non-drinker         Substance Abuse History:     Last Reviewed on 10/26/2018 04:55 PM by Velasquez Daniel        Mental Health History:     Last Reviewed on 10/26/2018 04:55 PM by Velasquez Daniel        Communicable Diseases (eg STDs):     Last Reviewed on 10/26/2018 04:55 PM by Velasquez Daniel            Current Problems:     Last Reviewed on 10/26/2018 04:55 PM by Velasquez Daniel    Type 2 diabetes     Generalized osteoarthritis     Elevated cholesterol     Generalized OA     Osteoarthritis of knee     Hypertension         Immunizations:     Havrix -adult dose (HepA) 6/11/2018     Havrix -adult dose (HepA) 6/9/2018     Shingrix (Zoster recombinant) 6/9/2018     Shingrix (Zoster recombinant) 8/29/2018         Allergies:     Last Reviewed on 10/26/2018 04:55 PM by Velasquez Daniel      No Known Drug Allergies.         Current Medications:     Last Reviewed on 10/26/2018 04:55 PM by Velasquez Daneil    Amlodipine  10mg Tablet Take 1 tablet(s) by mouth daily     Diclofenac Sodium 75mg Tablets, Enteric Coated Take 1 tablet(s) by mouth bid with food     Hydrochlorothiazide (HCTZ) 25mg Tablet Take 1 tablet(s) by mouth daily     Loratadine 10mg Tablet 1 tab daily     Vitamin C 500mg Tablet 1 tab daily     Ibuprofen prn     Glucosamine 1,500mg Tablet Take 1 tablet(s) by mouth daily          OBJECTIVE:        Vitals:         Current: 10/26/2018 4:25:26 PM    Ht:  5 ft, 7.5 in;  Wt: 287.8 lbs;  BMI: 44.4    T: 98.6 F (oral);  BP: 129/63 mm Hg (right arm, sitting);  P: 95 bpm (right arm (BP Cuff), sitting);  sCr: 0.92 mg/dL;  GFR: 92.44        Exams:     PHYSICAL EXAM:     GENERAL: Vitals recorded well developed,  moderately obese;     NECK: range of motion is normal; thyroid is non-palpable;     RESPIRATORY: normal respiratory rate and pattern with no distress; normal breath sounds with no rales, rhonchi, wheezes or rubs;     CARDIOVASCULAR: normal rate; rhythm is regular;  no systolic murmur;     GASTROINTESTINAL: nontender; normal bowel sounds; no masses;     SKIN:  no significant rashes or lesions; no suspicious moles;     Foot exam performed.      Left foot exam    Protective sensation using Monofilament test: NORMAL sensation. Patient detects .07 grams of force which is considered normal.    Vascular status: normal peripheral vascular exam with palpable dorsal pedal and posterior tibal pulses and brisk digital capillary refill    Skin is intact without sores or ulcers    Right foot exam    Protective sensation using Monofilament test: NORMAL sensation. Patient detects .07 grams of force which is considered normal.    Vascular status: normal peripheral vascular exam with palpable dorsal pedal and posterior tibal pulses and brisk digital capillary refill    Skin is intact without sores or ulcers         Lab/Test Results:         LABORATORY RESULTS: A1C 6.2%/jv         Hemoglobin A1c:  6.2 (10/26/2018),     Performed by::  anson (10/26/2018),             Procedures:     Influenza vaccination     1. Influenza high dose 0.5 ml unit dose, Administered by tls given IM in the left upper arm;  lot number Lot Number [id number]; expires Expiration date 06/04/2019 Regarding contraindications to an Influenza vaccine: Denies moderate/severe illness with/without fever; serious reaction to eggs, egg proteins,  gentamicin, gelatin, arginine, neomycin or polymixin; serious reaction after recieving previous influenza vaccines; and history of Guillain-[place] Syndrome.          Pneumovax administration     1. Prevnar 13 (pneumococcal PCV13): 0.5 ml unit dose given IM in the right upper arm; administered by tls;  lot number Lot Number [id number]; expires Expiration date 07/20             ASSESSMENT           250.00   E11.9  Type 2 diabetes              DDx:     272.0   E78.00  Elevated cholesterol              DDx:     401.1   I10  Hypertension              DDx:     V04.81   Z23  Influenza vaccination              DDx:     V03.82   Z23  Pneumovax administration              DDx:         ORDERS:         Meds Prescribed:       Refill of: Amlodipine  10mg Tablet Take 1 tablet(s) by mouth daily  #90 (Ninety) tablet(s) Refills: 1       Refill of: Diclofenac Sodium 75mg Tablets, Enteric Coated Take 1 tablet(s) by mouth bid with food  #180 (One [place] and Eighty) tablet(s) Refills: 1       Refill of: Hydrochlorothiazide (HCTZ) 25mg Tablet Take 1 tablet(s) by mouth daily  #90 (Ninety) tablet(s) Refills: 1       Atorvastatin Calcium 10mg Tablet Take 1 tablet(s) by mouth daily  #90 (Ninety) tablet(s) Refills: 1         Radiology/Test Orders:       3017F  Colorectal CA screen results documented and reviewed (PV)  (In-House)           Lab Orders:       61196*  Hgb A1c fast lab  (In-House)           Procedures Ordered:       41918  Prevnar 13  (In-House)         36011  Fluzone High Dose  (In-House)         94258  Collection of capillary blood specimen (eg, finger, heel, ear stick)  (In-House)           Other Orders:       2028F  Foot examination performed (includes examination through visual inspection, sensory exam with monofi  (In-House)           Calculated BMI above the upper parameter and a follow-up plan was documented in the medical record  (In-House)           Administration of pneumococcal vaccine (x1)          Administration of influenza virus vaccine (x1)                 PLAN:          Type 2 diabetes     LABORATORY:  Labs ordered to be performed today include Hgb A1c inhouse fast lab.      RECOMMENDATIONS given include: Today, we have reviewed Marcelino's care.  His A1C is quite a bit better.  I have encouraged him to keep up the good work with the diet and weight.  It will take continued long term effort to lose additional weight and/or keep off what he has lost.  We also reviewed his medications.  No change in blood pressure.  I am recommending a trial of statin therapy given the diabetes diagnosis.  We will follow from there..  MIPS Vaccines Flu and Pneumonia updated in Shot record     COLORECTAL CANCER SCREENING: Results are in chart     BMI Elevated - Follow-Up Plan: He was provided education on weight loss strategies           Orders:       37020*  Hgb A1c fast lab  (In-House)         2028F  Foot examination performed (includes examination through visual inspection, sensory exam with monofi  (In-House)         3017F  Colorectal CA screen results documented and reviewed (PV)  (In-House)           Calculated BMI above the upper parameter and a follow-up plan was documented in the medical record  (In-House)         00068  Collection of capillary blood specimen (eg, finger, heel, ear stick)  (In-House)             Patient Education Handouts:       Non-Insulin Dependent Diabetes Mellitus (NIDDM)           Elevated cholesterol As above.           Prescriptions:       Atorvastatin Calcium 10mg Tablet Take 1 tablet(s) by mouth daily  #90 (Ninety) tablet(s) Refills: 1          Hypertension As above.           Prescriptions:       Refill of: Hydrochlorothiazide (HCTZ) 25mg Tablet Take 1 tablet(s) by mouth daily  #90 (Ninety) tablet(s) Refills: 1       Refill of: Amlodipine  10mg Tablet Take 1 tablet(s) by mouth daily  #90 (Ninety) tablet(s) Refills: 1          Influenza vaccination           Orders:       94615  Fluzone  High Dose  (In-House)                     Administration of influenza virus vaccine (x1)          Pneumovax administration           Orders:       40460  Prevnar 13  (In-House)                     Administration of pneumococcal vaccine (x1)             Other Prescriptions:       Refill of: Diclofenac Sodium 75mg Tablets, Enteric Coated Take 1 tablet(s) by mouth bid with food  #180 (One [place] and Eighty) tablet(s) Refills: 1         CHARGE CAPTURE           **Please note: ICD descriptions below are intended for billing purposes only and may not represent clinical diagnoses**        Primary Diagnosis:         250.00 Type 2 diabetes            E11.9    Type 2 diabetes mellitus without complications              Orders:          87687   Office/outpatient visit; established patient, level 4  (In-House)             13319*   Hgb A1c fast lab  (In-House)             2028F   Foot examination performed (includes examination through visual inspection, sensory exam with monofi  (In-House)             3017F   Colorectal CA screen results documented and reviewed (PV)  (In-House)                Calculated BMI above the upper parameter and a follow-up plan was documented in the medical record  (In-House)             30159   Collection of capillary blood specimen (eg, finger, heel, ear stick)  (In-House)           272.0 Elevated cholesterol            E78.00    Pure hypercholesterolemia, unspecified    401.1 Hypertension            I10    Essential (primary) hypertension    V04.81 Influenza vaccination            Z23    Encounter for immunization              Orders:          84010   Fluzone High Dose  (In-House)                                           Administration of influenza virus vaccine (x1)         V03.82 Pneumovax administration            Z23    Encounter for immunization              Orders:          35849   Prevnar 13  (In-House)                                           Administration of  pneumococcal vaccine (x1)

## 2021-05-18 NOTE — PROGRESS NOTES
Andrea DiazSussy 1955     Office/Outpatient Visit    Visit Date: Thu, Jun 21, 2018 04:17 pm    Provider: Velasquez Daniel MD (Assistant: Jaimie Espinoza)    Location: [facility]        Electronically signed by Velasquez Daniel MD on  06/21/2018 05:49:02 PM                             SUBJECTIVE:        CC: blood pressure, arthritis pain         HPI:         Patient presents with elevated cholesterol.  Current treatment includes diet.  Compliance with treatment has been fair; he does not follow a diet and exercise regimen.          Concerning hypertension, his current cardiac medication regimen includes a diuretic ( Hydrodiuril ) and a calcium channel blocker ( Norvasc ).  Marcelino does not check his blood pressure other than at his clinic appointments.  He is tolerating the medication well without side effects.          Marcelino also has arthritis especially in the L hand which bothers him.  He is moving towards retiring, but he does continue to use diclofenac for now to help with pain.  Risks are discussed.     ROS:     CONSTITUTIONAL:  Negative for chills and fever.      CARDIOVASCULAR:  Negative for chest pain and palpitations.      RESPIRATORY:  Negative for recent cough and dyspnea.      GASTROINTESTINAL:  Negative for abdominal pain, nausea and vomiting.          [facility]/SH:     Last Reviewed on 6/21/2018 04:43 PM by Velasquez Daniel    Past Medical History:         Hypertension     Chronic Pain: affecting the knees;     Osteoarthritis: primarily affecting the knees;         PREVENTIVE HEALTH MAINTENANCE             COLORECTAL CANCER SCREENING: Up to date (colonoscopy q10y; sigmoidoscopy q5y; Cologuard q3y) was last done 11/2017, Results are in chart; colonoscopy with normal results; hyperplastic polyp         Surgical History:         Joint Replacement: R Knee - August 2012 - [facility] Ha;      R Knee - Meniscus;    L Knee arthroplasty 2-2015;         Family History:         Positive for Coronary  Artery Disease ( mother ).      Positive for Colon Cancer ( uncle ) and Pancreatric Cancer ( father ).      Positive for Type 2 Diabetes ( father ).          Social History:     Occupation: [employer]     Marital Status:      Children: 2 children         Tobacco/Alcohol/Supplements:     Last Reviewed on 6/21/2018 04:43 PM by Velasquez Daniel    Tobacco: He has never smoked.  Non-drinker         Substance Abuse History:     Last Reviewed on 6/21/2018 04:43 PM by Velasquez Daniel        Mental Health History:     Last Reviewed on 6/21/2018 04:43 PM by Velasquez Daniel        Communicable Diseases (eg STDs):     Last Reviewed on 6/21/2018 04:43 PM by Velasquez Daniel            Current Problems:     Last Reviewed on 6/21/2018 04:43 PM by Velasquez Daniel    Generalized osteoarthritis     Elevated cholesterol     Generalized OA     Osteoarthritis of knee     Hypertension         Immunizations:     Havrix -adult dose (HepA) 6/11/2018     Havrix -adult dose (HepA) 6/9/2018     Shingrix (Zoster subunit) 6/9/2018         Allergies:     Last Reviewed on 6/21/2018 04:43 PM by Velasquez Daniel      No Known Drug Allergies.         Current Medications:     Last Reviewed on 6/21/2018 04:43 PM by Velasquez Daniel    Diclofenac Sodium 75mg Tablets, Enteric Coated Take 1 tablet(s) by mouth bid with food     Amlodipine  10mg Tablet Take 1 tablet(s) by mouth daily     Hydrochlorothiazide (HCTZ) 25mg Tablet Take 1 tablet(s) by mouth daily     Loratadine 10mg Tablet 1 tab daily     Fluticasone Propionate 50mcg/1actuation Nasal Spray 1 spray each nostil daily     Vitamin C 500mg Tablet 1 tab daily     Ibuprofen prn     Glucosamine 1,500mg Tablet Take 1 tablet(s) by mouth daily         OBJECTIVE:        Vitals:         Current: 6/21/2018 4:19:59 PM    Ht:  5 ft, 7.5 in;  Wt: 299.5 lbs;  BMI: 46.2    T: 98 F (oral);  BP: 136/77 mm Hg (left arm, sitting);  P: 67 bpm (left arm (BP Cuff),  sitting);  sCr: 1.04 mg/dL;  GFR: 84.25        Exams:     PHYSICAL EXAM:     GENERAL: Vitals recorded well developed,  morbidly obese;     EYES: extraocular movements intact; conjunctiva and cornea are normal; PERRL;     E/N/T: EARS:  normal external auditory canals and tympanic membranes;  grossly normal hearing; OROPHARYNX:  normal mucosa, dentition, gingiva, and posterior pharynx;     NECK: range of motion is normal; thyroid is non-palpable;     RESPIRATORY: normal respiratory rate and pattern with no distress; normal breath sounds with no rales, rhonchi, wheezes or rubs;     CARDIOVASCULAR: normal rate; rhythm is regular;  no systolic murmur;     GASTROINTESTINAL: nontender; normal bowel sounds; no masses;     SKIN:  no significant rashes or lesions; no suspicious moles;         ASSESSMENT           272.0   E78.00  Elevated cholesterol              DDx:     401.1   I10  Hypertension              DDx:     715.09   M15.0  Generalized osteoarthritis              DDx:         ORDERS:         Meds Prescribed:       Refill of: Diclofenac Sodium 75mg Tablets, Enteric Coated Take 1 tablet(s) by mouth bid with food  #180 (One [place] and Eighty) tablet(s) Refills: 1       Refill of: Amlodipine  10mg Tablet Take 1 tablet(s) by mouth daily  #90 (Ninety) tablet(s) Refills: 1       Refill of: Hydrochlorothiazide (HCTZ) 25mg Tablet Take 1 tablet(s) by mouth daily  #90 (Ninety) tablet(s) Refills: 1         Radiology/Test Orders:       3017F  Colorectal CA screen results documented and reviewed (PV)  (In-House)           Lab Orders:       19526  HTNLP - [facility] CMP AND LIPID: [id number], [id number]  (Send-Out)  (SCHEDULE FASTING LABS)       66685  TSH - [facility] TSH  (Send-Out)           Other Orders:         Calculated BMI above the upper parameter and a follow-up plan was documented in the medical record  (In-House)                   PLAN:          Elevated cholesterol     LABORATORY:  Labs ordered to be performed today include HTN/Lipid  Panel: CMP, Lipid and TSH.      RECOMMENDATIONS given include: Today, we have again reviewed Marcelino's care.  I'm going to refill his medications and go from there.  We will arrange fasting labs.  No changes are made today, but I did suggest that he try to get by with one a day on the diclofenac..  MIPS     COLORECTAL CANCER SCREENING: Results are in chart     BMI Elevated - Follow-Up Plan: He was provided education on weight loss strategies           Orders:       3017F  Colorectal CA screen results documented and reviewed (PV)  (In-House)           Calculated BMI above the upper parameter and a follow-up plan was documented in the medical record  (In-House)         53844  HTNLP - [facility] CMP AND LIPID: [id number], [id number]  (Send-Out)  (SCHEDULE FASTING LABS)       11813  TSH - [facility] TSH  (Send-Out)             Patient Education Handouts:       [facility] Medication Compliance           Hypertension As above.           Prescriptions:       Refill of: Amlodipine  10mg Tablet Take 1 tablet(s) by mouth daily  #90 (Ninety) tablet(s) Refills: 1       Refill of: Hydrochlorothiazide (HCTZ) 25mg Tablet Take 1 tablet(s) by mouth daily  #90 (Ninety) tablet(s) Refills: 1          Generalized osteoarthritis As above.           Prescriptions:       Refill of: Diclofenac Sodium 75mg Tablets, Enteric Coated Take 1 tablet(s) by mouth bid with food  #180 (One [place] and Eighty) tablet(s) Refills: 1             CHARGE CAPTURE           **Please note: ICD descriptions below are intended for billing purposes only and may not represent clinical diagnoses**        Primary Diagnosis:         272.0 Elevated cholesterol            E78.00    Pure hypercholesterolemia, unspecified              Orders:          22291   Office/outpatient visit; established patient, level 4  (In-House)             3017F   Colorectal CA screen results documented and reviewed (PV)  (In-House)                Calculated BMI above the upper parameter and a follow-up plan was  documented in the medical record  (In-House)           401.1 Hypertension            I10    Essential (primary) hypertension    715.09 Generalized osteoarthritis            M15.0    Primary generalized (osteo)arthritis        ADDENDUMS:      ____________________________________    Addendum: 07/18/2018 01:19 PM - Irlanda Grijalva         Visit Note Faxed to:        User Entered Recipient; Number [phone]

## 2021-05-18 NOTE — PROGRESS NOTES
Andrea DiazSussy 1955     Office/Outpatient Visit    Visit Date: Tue, Feb 19, 2019 04:06 pm    Provider: Velasquez Daniel MD (Assistant: Trudi Hirsch RN)    Location: [facility]        Electronically signed by Velasquez Daniel MD on  02/19/2019 05:37:06 PM                             SUBJECTIVE:        CC: cough         HPI:         Cough noted.  It has been present for the past several days.  Respiratory symptoms include chest congestion, cough and sinus pressure.  Other symptoms include nasal congestion, nasal discharge and sinus pain/pressure.  He denies fever.  Sputum is described as moderate in volume.  He denies exposure to ill contacts.  Marcelino went to a tractor pull last week and was exposed to all kinds of fumes for about 4 nights.  He says that he thinks this may have contributed to this.     ROS:     CONSTITUTIONAL:  Negative for chills and fever.      CARDIOVASCULAR:  Negative for chest pain and palpitations.      GASTROINTESTINAL:  Negative for abdominal pain, nausea and vomiting.      INTEGUMENTARY:  Negative for atypical mole(s) and rash.          [facility]/:     Last Reviewed on 2/19/2019 04:52 PM by Velasquez Daniel    Past Medical History:         Hypertension     Chronic Pain: affecting the knees;     Osteoarthritis: primarily affecting the knees;         PREVENTIVE HEALTH MAINTENANCE             COLORECTAL CANCER SCREENING: Up to date (colonoscopy q10y; sigmoidoscopy q5y; Cologuard q3y) was last done 11/2017, Results are in chart; colonoscopy with normal results; hyperplastic polyp         Surgical History:         Joint Replacement: R Knee - August 2012 - [facility] Ha;      R Knee - Meniscus;    L Knee arthroplasty 2-2015;         Family History:         Positive for Coronary Artery Disease ( mother ).      Positive for Colon Cancer ( uncle ) and Pancreatric Cancer ( father ).      Positive for Type 2 Diabetes ( father ).          Social History:     Occupation: [employer]      Marital Status:      Children: 2 children         Tobacco/Alcohol/Supplements:     Last Reviewed on 2/19/2019 04:52 PM by Velasquez Daniel    Tobacco: He has never smoked.  Non-drinker         Substance Abuse History:     Last Reviewed on 2/19/2019 04:52 PM by Velasquez Daniel        Mental Health History:     Last Reviewed on 2/19/2019 04:52 PM by Velasquez Daniel        Communicable Diseases (eg STDs):     Last Reviewed on 2/19/2019 04:52 PM by Velasquez Daniel            Current Problems:     Last Reviewed on 2/19/2019 04:52 PM by Velasquez Daniel    Type 2 diabetes     Generalized osteoarthritis     Elevated cholesterol     Generalized OA     Osteoarthritis of knee     Hypertension     Screening for prostate cancer         Immunizations:     Havrix -adult dose (HepA) 6/11/2018     Havrix -adult dose (HepA) 1/2/2019     Prevnar 13 (Pneumococcal PCV 13) 10/26/2018     Fluzone High-Dose pf (>=65 yr) 10/26/2018     Shingrix (Zoster recombinant) 6/9/2018     Shingrix (Zoster recombinant) 8/29/2018         Allergies:     Last Reviewed on 2/19/2019 04:52 PM by Velasquez Daniel      No Known Drug Allergies.         Current Medications:     Last Reviewed on 2/19/2019 04:52 PM by Velasquez Daniel    Amlodipine  10mg Tablet Take 1 tablet(s) by mouth daily     Atorvastatin Calcium 10mg Tablet Take 1 tablet(s) by mouth daily     Diclofenac Sodium 75mg Tablets, Enteric Coated Take 1 tablet(s) by mouth bid with food     Hydrochlorothiazide (HCTZ) 25mg Tablet Take 1 tablet(s) by mouth daily     Loratadine 10mg Tablet 1 tab daily     Vitamin C 500mg Tablet 1 tab daily     Ibuprofen prn     Glucosamine 1,500mg Tablet Take 1 tablet(s) by mouth daily         OBJECTIVE:        Vitals:         Current: 2/19/2019 4:10:37 PM    Ht:  5 ft, 7.5 in;  Wt: 285.8 lbs;  BMI: 44.1    T: 98.5 F (oral);  BP: 126/70 mm Hg (right arm, sitting);  P: 88 bpm (right arm (BP Cuff), sitting);  sCr:  1.04 mg/dL;  GFR: 81.53        Exams:     PHYSICAL EXAM:     GENERAL: Vitals recorded well developed, well nourished;  tired-appearing;     EYES: extraocular movements intact; conjunctiva and cornea are normal; PERRL;     E/N/T: EARS:  normal external auditory canals and tympanic membranes;  grossly normal hearing; NOSE: bilateral frontal sinus tenderness present; OROPHARYNX:  normal mucosa, dentition, gingiva, and posterior pharynx;     NECK: range of motion is normal; thyroid is non-palpable;     RESPIRATORY: normal respiratory rate and pattern with no distress; normal breath sounds with no rales, rhonchi, wheezes or rubs;     CARDIOVASCULAR: normal rate; rhythm is regular;  no systolic murmur;     GASTROINTESTINAL: nontender; normal bowel sounds; no masses;     LYMPHATIC: no enlargement of cervical or facial nodes; no supraclavicular nodes;         ASSESSMENT           786.2   R05  Cough              DDx:         ORDERS:         Meds Prescribed:       Cefdinir 300mg Capsules Take 1 capsule(s) by mouth q12h for 10 days  #20 (Twenty) capsule(s) Refills: 0       Promethazine with Dextromethrophan 6.25mg/15mg per 5ml Syrup Take 1 to 2 teaspoon by mouth q 4 to 6 hr as needed for cough  #4 (Four) oz Refills: 0       Prednisone 20mg Tablet Take  2 tablet(s) by mouth qd  #10 (Ten) tablet(s) Refills: 0         Radiology/Test Orders:       3017F  Colorectal CA screen results documented and reviewed (PV)  (In-House)           Other Orders:         Calculated BMI above the upper parameter and a follow-up plan was documented in the medical record  (In-House)                   PLAN:          Cough         RECOMMENDATIONS given include: Marcelino does not feel well.  I'm going to cover his sinuses as noted below.  He will miss work today and may also miss tomorrow.  He will let us know if he does not improve as expected..  MIPS Vaccines Flu and Pneumonia updated in Shot record     COLORECTAL CANCER SCREENING: Results are in  chart     BMI Elevated - Follow-Up Plan: He was provided education on weight loss strategies           Prescriptions:       Cefdinir 300mg Capsules Take 1 capsule(s) by mouth q12h for 10 days  #20 (Twenty) capsule(s) Refills: 0       Promethazine with Dextromethrophan 6.25mg/15mg per 5ml Syrup Take 1 to 2 teaspoon by mouth q 4 to 6 hr as needed for cough  #4 (Four) oz Refills: 0       Prednisone 20mg Tablet Take  2 tablet(s) by mouth qd  #10 (Ten) tablet(s) Refills: 0           Orders:       3017F  Colorectal CA screen results documented and reviewed (PV)  (In-House)           Calculated BMI above the upper parameter and a follow-up plan was documented in the medical record  (In-House)             Patient Education Handouts:       Acute Bronchitis              CHARGE CAPTURE           **Please note: ICD descriptions below are intended for billing purposes only and may not represent clinical diagnoses**        Primary Diagnosis:         786.2 Cough            R05    Cough              Orders:          37858   Office/outpatient visit; established patient, level 3  (In-House)             3017F   Colorectal CA screen results documented and reviewed (PV)  (In-House)                Calculated BMI above the upper parameter and a follow-up plan was documented in the medical record  (In-House)

## 2021-05-18 NOTE — PROGRESS NOTES
Andrea DiazSussy 1955     Office/Outpatient Visit    Visit Date: Tue, Oct 1, 2019 03:52 pm    Provider: Velasquez Daniel MD (Assistant: Trudi Hirsch RN)    Location: [facility]        Electronically signed by Velasquez Daniel MD on  10/01/2019 09:18:21 PM                             SUBJECTIVE:        CC: blood pressure, cholesterol, arthritis pain         HPI:         Patient to be evaluated for type 2 diabetes.  Current meds include nothing.  He does not perform home blood glucose monitoring.  Most recent lab results include LDL:  82 (mg/dL) (02/02/2019), Hemoglobin A1c:  6.2 (%) (02/02/2019).          Additionally, he presents with history of elevated cholesterol.  current treatment includes Lipitor.  Compliance with treatment has been good; he takes his medication as directed and follows up as directed.  He denies experiencing any hypercholesterolemia related symptoms.          Additionally, he presents with history of hypertension.  his current cardiac medication regimen includes a diuretic ( Hydrodiuril ) and a calcium channel blocker ( Norvasc ).  Marcelino does not check his blood pressure other than at his clinic appointments.  He is tolerating the medication well without side effects.          Marcelino also takes diclofenac for arthritis pain fairly regularly.  He has not had issues with this.     ROS:     CONSTITUTIONAL:  Negative for chills and fever.      CARDIOVASCULAR:  Negative for chest pain and palpitations.      RESPIRATORY:  Negative for recent cough and dyspnea.      GASTROINTESTINAL:  Negative for abdominal pain, nausea and vomiting.          PMH/FM/SH:     Last Reviewed on 10/01/2019 04:03 PM by Velasquez Daniel    Past Medical History:         Hypertension     Chronic Pain: affecting the knees;     Osteoarthritis: primarily affecting the knees;         PREVENTIVE HEALTH MAINTENANCE             COLORECTAL CANCER SCREENING: Up to date (colonoscopy q10y; sigmoidoscopy q5y;  Cologuard q3y) was last done 11/2017, Results are in chart; colonoscopy with normal results; hyperplastic polyp         Surgical History:         Joint Replacement: R Knee - August 2012 - [facility] Ha;      R Knee - Meniscus;    L Knee arthroplasty 2-2015;         Family History:         Positive for Coronary Artery Disease ( mother ).      Positive for Colon Cancer ( uncle ) and Pancreatric Cancer ( father ).      Positive for Type 2 Diabetes ( father ).          Social History:     Occupation: [employer]     Marital Status:      Children: 2 children         Tobacco/Alcohol/Supplements:     Last Reviewed on 10/01/2019 04:03 PM by Velasquez Daniel    Tobacco: He has never smoked.  Non-drinker         Substance Abuse History:     Last Reviewed on 10/01/2019 04:03 PM by Velasquez Daniel        Mental Health History:     Last Reviewed on 10/01/2019 04:03 PM by Velasquez Daniel        Communicable Diseases (eg STDs):     Last Reviewed on 10/01/2019 04:03 PM by Velasquez Daniel            Current Problems:     Last Reviewed on 10/01/2019 04:03 PM by Velasquez Daniel    Type 2 diabetes     Generalized osteoarthritis     Elevated cholesterol     Generalized OA     Osteoarthritis of knee     Hypertension     Screening for prostate cancer         Immunizations:     Havrix -adult dose (HepA) 6/11/2018     Havrix -adult dose (HepA) 1/2/2019     Prevnar 13 (Pneumococcal PCV 13) 10/26/2018     Fluzone High-Dose pf (>=65 yr) 10/26/2018     Shingrix (Zoster recombinant) 6/9/2018     Shingrix (Zoster recombinant) 8/29/2018     Zostavax (Zoster live) 10/9/2013         Allergies:     Last Reviewed on 10/01/2019 04:03 PM by Velasquez Daniel      No Known Drug Allergies.         Current Medications:     Last Reviewed on 10/01/2019 04:03 PM by Velasquez Daniel    Amlodipine  10mg Tablet Take 1 tablet(s) by mouth daily     Atorvastatin Calcium 10mg Tablet Take 1 tablet(s) by mouth daily      Diclofenac Sodium 75mg Tablets, Enteric Coated Take 1 tablet(s) by mouth bid with food     Hydrochlorothiazide (HCTZ) 25mg Tablet Take 1 tablet(s) by mouth daily     Loratadine 10mg Tablet 1 tab daily     Vitamin C 500mg Tablet 1 tab daily     Ibuprofen prn     Glucosamine 1,500mg Tablet Take 1 tablet(s) by mouth daily         OBJECTIVE:        Vitals:         Current: 10/1/2019 3:57:30 PM    Ht:  5 ft, 7.5 in;  Wt: 294 lbs;  BMI: 45.4    T: 98.1 F (oral);  BP: 144/82 mm Hg (right arm, sitting);  P: 68 bpm (right arm (BP Cuff), sitting);  sCr: 1.04 mg/dL;  GFR: 81.46        Exams:     PHYSICAL EXAM:     GENERAL: Vitals recorded well developed,  moderately obese;     EYES: extraocular movements intact; conjunctiva and cornea are normal; PERRL;     E/N/T: EARS:  normal external auditory canals and tympanic membranes;  grossly normal hearing; OROPHARYNX:  normal mucosa, dentition, gingiva, and posterior pharynx;     NECK: range of motion is normal; thyroid is non-palpable;     RESPIRATORY: normal respiratory rate and pattern with no distress; normal breath sounds with no rales, rhonchi, wheezes or rubs;     CARDIOVASCULAR: normal rate; rhythm is regular;  no systolic murmur;     GASTROINTESTINAL: nontender; normal bowel sounds; no masses;     LYMPHATIC: no enlargement of cervical or facial nodes; no supraclavicular nodes;     SKIN:  no significant rashes or lesions; no suspicious moles;     NEUROLOGIC: mental status: alert and oriented x 3; cranial nerves II-XII grossly intact;     PSYCHIATRIC: appropriate affect and demeanor; normal psychomotor function;         Procedures:     Pneumovax administration     1. Pneumovax (pneumococcal PPSV23): 0.5 ml unit dose given IM in the left upper arm; administered by [facility]         Influenza vaccination     1. Influenza high dose 0.5 ml unit dose, ABN signed given IM in the right upper arm; administered by [facility] Regarding contraindications to an Influenza vaccine: Denies  moderate/severe illness with/without fever; serious reaction to eggs, egg proteins, gentamicin, gelatin, arginine, neomycin or polymixin; serious reaction after recieving previous influenza vaccines; and history of Guillain-[place] Syndrome.              ASSESSMENT           250.00   E11.9  Type 2 diabetes              DDx:     272.0   E78.00  Elevated cholesterol              DDx:     401.1   I10  Hypertension              DDx:     715.09   M15.0  Generalized osteoarthritis              DDx:     V03.82   Z23  Pneumovax administration              DDx:     V04.81   Z23  Influenza vaccination              DDx:         ORDERS:         Meds Prescribed:       Refill of: Amlodipine  10mg Tablet Take 1 tablet(s) by mouth daily  #90 (Ninety) tablet(s) Refills: 1       Refill of: Atorvastatin Calcium 10mg Tablet Take 1 tablet(s) by mouth daily  #90 (Ninety) tablet(s) Refills: 1       Refill of: Diclofenac Sodium 75mg Tablets, Enteric Coated Take 1 tablet(s) by mouth bid with food  #180 ([address] [place] [address]) tablet(s) Refills: 1       Refill of: Hydrochlorothiazide (HCTZ) 25mg Tablet Take 1 tablet(s) by mouth daily  #90 (Ninety) tablet(s) Refills: 1         Radiology/Test Orders:       3017F  Colorectal CA screen results documented and reviewed (PV)  (In-House)           Lab Orders:       [id number]  COMP - [facility] Comp. Metabolic Panel  (Send-Out)         62613  A1CEG - [facility] Hemoglobin A1C  (Send-Out)           Procedures Ordered:       32424  PNEUMOVAX 23  (In-House)         64453  Fluzone High Dose  (In-House)           Other Orders:         Depression screen negative  (In-House)         1101F  Pt screen for fall risk; document no falls in past year or only 1 fall w/o injury in past year (PRINCESS)  (In-House)           Administration of pneumococcal vaccine (x1)         Administration of influenza virus vaccine (x1)                 PLAN:          Type 2 diabetes     Marcelino is doing well for the most part.  We will refill  his medications and update labs as noted.  No changes are anticipated.  He seems well.      LABORATORY:  Labs ordered to be performed today include Comprehensive metabolic panel and HgbA1C.  [facility] PHQ-9 Depression Screening: Completed form scanned and in chart; Total Score 0; Negative Depression Screen           Orders:       01925  COMP - [facility] Comp. Metabolic Panel  (Send-Out)         20939  A1CEG - [facility] Hemoglobin A1C  (Send-Out)           Depression screen negative  (In-House)         1101F  Pt screen for fall risk; document no falls in past year or only 1 fall w/o injury in past year (PRINCESS)  (In-House)         3017F  Colorectal CA screen results documented and reviewed (PV)  (In-House)             Patient Education Handouts:       Non-Insulin Dependent Diabetes Mellitus (NIDDM)           Elevated cholesterol           Prescriptions:       Refill of: Atorvastatin Calcium 10mg Tablet Take 1 tablet(s) by mouth daily  #90 (Ninety) tablet(s) Refills: 1          Hypertension           Prescriptions:       Refill of: Amlodipine  10mg Tablet Take 1 tablet(s) by mouth daily  #90 (Ninety) tablet(s) Refills: 1       Refill of: Hydrochlorothiazide (HCTZ) 25mg Tablet Take 1 tablet(s) by mouth daily  #90 (Ninety) tablet(s) Refills: 1          Generalized osteoarthritis           Prescriptions:       Refill of: Diclofenac Sodium 75mg Tablets, Enteric Coated Take 1 tablet(s) by mouth bid with food  #180 ([address] [place] [address]) tablet(s) Refills: 1          Pneumovax administration           Orders:       30315  PNEUMOVAX 23  (In-House)                     Administration of pneumococcal vaccine (x1)          Influenza vaccination           Orders:       14996  Fluzone High Dose  (In-House)                     Administration of influenza virus vaccine (x1)             CHARGE CAPTURE           **Please note: ICD descriptions below are intended for billing purposes only and may not represent clinical diagnoses**         Primary Diagnosis:         250.00 Type 2 diabetes            E11.9    Type 2 diabetes mellitus without complications              Orders:          90532   Office/outpatient visit; established patient, level 4  (In-House)                Depression screen negative  (In-House)             1101F   Pt screen for fall risk; document no falls in past year or only 1 fall w/o injury in past year (PRINCESS)  (In-House)             3017F   Colorectal CA screen results documented and reviewed (PV)  (In-House)           272.0 Elevated cholesterol            E78.00    Pure hypercholesterolemia, unspecified    401.1 Hypertension            I10    Essential (primary) hypertension    715.09 Generalized osteoarthritis            M15.0    Primary generalized (osteo)arthritis    V03.82 Pneumovax administration            Z23    Encounter for immunization              Orders:          77704   PNEUMOVAX 23  (In-House)                                           Administration of pneumococcal vaccine (x1)         V04.81 Influenza vaccination            Z23    Encounter for immunization              Orders:          71465   Fluzone High Dose  (In-House)                                           Administration of influenza virus vaccine (x1)

## 2021-05-18 NOTE — PROGRESS NOTES
Andrea Diaz CELIA 1955     Office/Outpatient Visit    Visit Date: Tue, Mar 20, 2018 05:57 pm    Provider: Trena Lemon N.P. (Assistant: Bernie Luna MA)    Location: [facility]        Electronically signed by Trena Lemon N.P. on  03/21/2018 10:49:28 AM                             SUBJECTIVE:        CC:     Marcelino is a 62 year old White male.  sinuses/congestion         HPI:         URI noted.  These have been present for the past one week.  The symptoms include productive cough, nasal congestion and purulent nasal discharge.  He denies body aches, fever or wheezing.  He denies exposure to ill contacts.  He has already tried to relieve the symptoms with antihistamines and flonase, mucinex.      ROS:     CONSTITUTIONAL:  Positive for fatigue and fever ( to 99.3 degrees ).      E/N/T:  Positive for nasal congestion and frequent rhinorrhea.   Negative for sore throat.      CARDIOVASCULAR:  Negative for chest pain, palpitations, tachycardia, orthopnea, and edema.      RESPIRATORY:  Positive for recent cough ( with scant amounts of purulent sputum ).      GASTROINTESTINAL:  Negative for abdominal pain, heartburn, constipation, diarrhea, and stool changes.      MUSCULOSKELETAL:  Negative for arthralgias, back pain, and myalgias.      NEUROLOGICAL:  Negative for dizziness, headaches, paresthesias, and weakness.      ALLERGIC/IMMUNOLOGIC:  Positive for seasonal allergies.          PMH/FMH/SH:     Last Reviewed on 1/06/2018 09:32 AM by Diana Soler    Past Medical History:         Hypertension     Chronic Pain: affecting the knees;     Osteoarthritis: primarily affecting the knees;         PREVENTIVE HEALTH MAINTENANCE             COLORECTAL CANCER SCREENING: Up to date (colonoscopy q10y; sigmoidoscopy q5y; Cologuard q3y) was last done 11/2017, Results are in chart; polyp removed         Surgical History:         Joint Replacement: R Knee - August 2012 - [facility] Ha;      R Knee - Meniscus;     L Knee arthroplasty 2-2015;         Family History:         Positive for Coronary Artery Disease ( mother ).      Positive for Colon Cancer ( uncle ) and Pancreatric Cancer ( father ).      Positive for Type 2 Diabetes ( father ).          Social History:     Occupation: [employer]     Marital Status:      Children: 2 children         Tobacco/Alcohol/Supplements:     Last Reviewed on 1/06/2018 09:32 AM by Diana Soler    Tobacco: He has never smoked.  Non-drinker         Substance Abuse History:     Last Reviewed on 1/06/2018 09:32 AM by Diana Soler        Mental Health History:     Last Reviewed on 1/06/2018 09:32 AM by Diana Soler        Communicable Diseases (eg STDs):     Last Reviewed on 1/06/2018 09:32 AM by Diana Soler            Current Problems:     Last Reviewed on 1/06/2018 09:32 AM by Diana Soler    Generalized osteoarthritis     Elevated cholesterol     Generalized OA     Osteoarthritis of knee     Hypertension         Immunizations:     None        Allergies:     Last Reviewed on 1/06/2018 09:32 AM by Diana Soler      No Known Drug Allergies.         Current Medications:     Last Reviewed on 1/06/2018 09:32 AM by Diana Soler    Amlodipine  10mg Tablet Take 1 tablet(s) by mouth daily     Diclofenac Sodium 75mg Tablets, Enteric Coated Take 1 tablet(s) by mouth bid with food     Hydrochlorothiazide (HCTZ) 25mg Tablet Take 1 tablet(s) by mouth daily     Loratadine 10mg Tablet 1 tab daily     Fluticasone Propionate 50mcg/1actuation Nasal Spray 1 spray each nostil daily     Vitamin C 500mg Tablet 1 tab daily     Ibuprofen prn     Glucosamine 1,500mg Tablet Take 1 tablet(s) by mouth daily         OBJECTIVE:        Vitals:         Historical:     01/06/2018  BP:   143/82 mm Hg ( (left arm, , sitting, );)     01/06/2018  Wt:   300lbs        Current: 3/20/2018 6:01:42 PM    Ht:  5 ft, 7.5 in;  Wt: 301.8 lbs;  BMI: 46.6    T: 99.3 F (oral);  BP: 144/71 mm Hg (left  arm, sitting);  P: 105 bpm (left arm (BP Cuff), sitting);  sCr: 1.04 mg/dL;  GFR: 84.52        Exams:     PHYSICAL EXAM:     GENERAL: obese;  tired-appearing;     E/N/T: EARS: both TMs are have fluid behind them;  NOSE: nasal mucosa is erythematous;  OROPHARYNX: posterior pharynx, including tonsils, tongue, and uvula are normal;     RESPIRATORY: normal respiratory rate and pattern with no distress; normal breath sounds with no rales, rhonchi, wheezes or rubs;     CARDIOVASCULAR: normal rate; rhythm is regular;     LYMPHATIC: no enlargement of cervical or facial nodes;     MUSCULOSKELETAL:  Normal range of motion, strength and tone;     NEUROLOGIC: mental status: alert and oriented x 3; GROSSLY INTACT     PSYCHIATRIC:  appropriate affect and demeanor; normal speech pattern; grossly normal memory;         ASSESSMENT           465.8   J06.9  URI              DDx:         ORDERS:         Meds Prescribed:       Doxycycline Monohydrate 100mg Tablet Take 1 tablet(s) by mouth bid  #14 (Fourteen) tablet(s) Refills: 0       Promethazine with Dextromethrophan 6.25mg/15mg per 5ml Syrup 1 to 2 tsp q hs prn cough  #4 (Four) oz Refills: 0       Albuterol 90mcg/1actuation Oral Inhaler 1 to 2 puffs q 4 -6 hours prn  #1 (One) inhaler(s) Refills: 0                 PLAN:          URI         RECOMMENDATIONS given include: rest, increase oral fluid intake, and promethazine dm may cause drowsiness.  follow up if not improving..            Prescriptions:       Doxycycline Monohydrate 100mg Tablet Take 1 tablet(s) by mouth bid  #14 (Fourteen) tablet(s) Refills: 0       Promethazine with Dextromethrophan 6.25mg/15mg per 5ml Syrup 1 to 2 tsp q hs prn cough  #4 (Four) oz Refills: 0       Albuterol 90mcg/1actuation Oral Inhaler 1 to 2 puffs q 4 -6 hours prn  #1 (One) inhaler(s) Refills: 0             CHARGE CAPTURE           **Please note: ICD descriptions below are intended for billing purposes only and may not represent clinical  diagnoses**        Primary Diagnosis:         465.8 URI            J06.9    Acute upper respiratory infection, unspecified              Orders:          87488   Office/outpatient visit; established patient, level 3  (In-House)

## 2021-05-18 NOTE — PROGRESS NOTES
"Andrea Diaz CELIA 1955     Office/Outpatient Visit    Visit Date: Tue, Aug 21, 2018 06:09 pm    Provider: Trena Lemon N.P. (Assistant: Sarah Spurling, MA)    Location: [facility]        Electronically signed by Trena Lemon N.P. on  08/21/2018 10:38:44 PM                             SUBJECTIVE:        CC:     Marcelino is a 63 year old White male.  Left hand, afraid he has a fungus, or severly dry skin.          HPI: seen with [employer] student         Marcelino presents with rash.  Left hand quarter size dry area with cracking in the middle. located near base of left 5th finger and palm  In winter time has cracking and dry skin. Applied neosporin and a zinc ointment. Keeps covered while working. Report to be very pruritic. current lesion present x 1-2 months. requests to have area \"cut off.\"    who works with grease and washes hands about \"20 times per day.\"     ROS:     CONSTITUTIONAL:  Negative for chills and fever.      CARDIOVASCULAR:  Negative for chest pain and palpitations.      RESPIRATORY:  Negative for recent cough and dyspnea.      MUSCULOSKELETAL:  Negative for myalgias.      INTEGUMENTARY:  Positive for rash (Hand).      NEUROLOGICAL:  Negative for paresthesias and weakness.      PSYCHIATRIC:  Negative for anxiety and depression.          PMH/FMH/SH:     Last Reviewed on 6/21/2018 04:43 PM by Velasquez Daniel    Past Medical History:         Hypertension     Chronic Pain: affecting the knees;     Osteoarthritis: primarily affecting the knees;         PREVENTIVE HEALTH MAINTENANCE             COLORECTAL CANCER SCREENING: Up to date (colonoscopy q10y; sigmoidoscopy q5y; Cologuard q3y) was last done 11/2017, Results are in chart; colonoscopy with normal results; hyperplastic polyp         Surgical History:         Joint Replacement: R Knee - August 2012 - [facility] Ha;      R Knee - Meniscus;    L Knee arthroplasty 2-2015;         Family History:         Positive for " Coronary Artery Disease ( mother ).      Positive for Colon Cancer ( uncle ) and Pancreatric Cancer ( father ).      Positive for Type 2 Diabetes ( father ).          Social History:     Occupation: [employer]     Marital Status:      Children: 2 children         Tobacco/Alcohol/Supplements:     Last Reviewed on 7/10/2018 02:41 PM by Trice Menjivar    Tobacco: He has never smoked.  Non-drinker         Substance Abuse History:     Last Reviewed on 6/21/2018 04:43 PM by Velasquez Daniel        Mental Health History:     Last Reviewed on 6/21/2018 04:43 PM by Velasquez Daniel        Communicable Diseases (eg STDs):     Last Reviewed on 6/21/2018 04:43 PM by Velasquez Daniel            Current Problems:     Last Reviewed on 6/21/2018 04:43 PM by Velasquez Daniel    Generalized osteoarthritis     Elevated cholesterol     Generalized OA     Osteoarthritis of knee     Hypertension     Foot pain         Immunizations:     Havrix -adult dose (HepA) 6/11/2018     Havrix -adult dose (HepA) 6/9/2018     Shingrix (Zoster subunit) 6/9/2018         Allergies:     Last Reviewed on 7/10/2018 02:41 PM by Trice Menjivar      No Known Drug Allergies.         Current Medications:     Last Reviewed on 7/10/2018 02:41 PM by Trice Menjivar    Amlodipine  10mg Tablet Take 1 tablet(s) by mouth daily     Diclofenac Sodium 75mg Tablets, Enteric Coated Take 1 tablet(s) by mouth bid with food     Hydrochlorothiazide (HCTZ) 25mg Tablet Take 1 tablet(s) by mouth daily     Loratadine 10mg Tablet 1 tab daily     Vitamin C 500mg Tablet 1 tab daily     Ibuprofen prn     Glucosamine 1,500mg Tablet Take 1 tablet(s) by mouth daily         OBJECTIVE:        Vitals:         Historical:     07/10/2018  BP:   132/78 mm Hg ( (left arm, , sitting, );)     07/10/2018  Wt:   301lbs        Current: 8/21/2018 6:18:45 PM    Ht:  5 ft, 7.5 in;  Wt: 292.2 lbs;  BMI: 45.1    T: 97.9 F (oral);  BP: 136/80 mm Hg (right arm, sitting);  P: 77  bpm (right arm (BP Cuff), sitting);  sCr: 0.92 mg/dL;  GFR: 93.04        Exams:     PHYSICAL EXAM:     GENERAL: Vitals recorded well developed, well nourished;  well groomed;  no apparent distress;     RESPIRATORY: normal respiratory rate and pattern with no distress; normal breath sounds with no rales, rhonchi, wheezes or rubs;     CARDIOVASCULAR: normal rate; rhythm is regular;  no edema;     GASTROINTESTINAL: nontender; normal bowel sounds;     SKIN: a rash is noted left palm, scaly and dry area just inferior to pinky finger. A small opening of skin in the middle of the quarter size area. Open area is 1 mm X 1cm fissure along crease of palm. No drng noted.;     MUSCULOSKELETAL: normal gait; normal overall tone     NEUROLOGIC: mental status: alert and oriented x 3;     PSYCHIATRIC:  appropriate affect and demeanor; normal speech pattern; grossly normal memory;         ASSESSMENT           782.1   R21  Rash              DDx:         ORDERS:         Meds Prescribed:       Betamethasone/Clotrimazole 0.05%/1% Cream apply bid x 2 weeks  #15 (Fifteen) gm Refills: 0                 PLAN:          Rash         eczema vs fungal.  I do not recommend cutting off area.  he states he does not like to wear gloves .  wife wants him to wear bandage but that will require frequent change of bandage and it will get wet with hand washing.  advise to use cream as below and use jose or eucerin lotion .  refer to dermatology if not improving.            Prescriptions:       Betamethasone/Clotrimazole 0.05%/1% Cream apply bid x 2 weeks  #15 (Fifteen) gm Refills: 0             Patient Recommendations:Keep area clean and dry. Keep covered while working and being exposed to chemicals.             CHARGE CAPTURE           **Please note: ICD descriptions below are intended for billing purposes only and may not represent clinical diagnoses**        Primary Diagnosis:         782.1 Rash            R21    Rash and other nonspecific skin  eruption              Orders:          [id number]   Office/outpatient visit; established patient, level 3  (In-House)

## 2021-05-18 NOTE — PROGRESS NOTES
Andrea Diaz CELIA 1955     Office/Outpatient Visit    Visit Date: Sat, Jan 6, 2018 08:49 am    Provider: Diana Soler N.P. (Assistant: Trice Menjivar MA)    Location: [facility]        Electronically signed by Diana Soler N.P. on  01/06/2018 01:05:23 PM                             SUBJECTIVE:        CC:     Marcelino is a 62 year old White male.  Upper Respirtory Illness         HPI:         Marcelino presents with upper respiratory illness.  States sinus congestion x 1 week. States ears popping, sore throat, dry cough. No meds taken for symptoms. Denies fever.      ROS:     CONSTITUTIONAL:  Positive for fatigue.   Negative for chills, fever or weight change.      CARDIOVASCULAR:  Negative for chest pain, orthopnea, paroxysmal nocturnal dyspnea and pedal edema.      RESPIRATORY:  Negative for dyspnea and cough.      GASTROINTESTINAL:  Negative for abdominal pain, heartburn, constipation, diarrhea, and stool changes.      MUSCULOSKELETAL:  Negative for arthralgias, back pain, and myalgias.      NEUROLOGICAL:  Negative for dizziness, headaches, paresthesias, and weakness.      PSYCHIATRIC:  Negative for anxiety and depression.          [facility]/:     Last Reviewed on 1/06/2018 09:30 AM by Diana Soler    Past Medical History:         Hypertension     Chronic Pain: affecting the knees;     Osteoarthritis: primarily affecting the knees;         PREVENTIVE HEALTH MAINTENANCE             COLORECTAL CANCER SCREENING: Up to date (colonoscopy q10y; sigmoidoscopy q5y; Cologuard q3y) was last done 11/2017, Results are in chart; polyp removed         Surgical History:         Joint Replacement: R Knee - August 2012 - [facility] Ha;      R Knee - Meniscus;    L Knee arthroplasty 2-2015;         Family History:         Positive for Coronary Artery Disease ( mother ).      Positive for Colon Cancer ( uncle ) and Pancreatric Cancer ( father ).      Positive for Type 2 Diabetes ( father ).          Social  History:     Occupation: [employer]     Marital Status:      Children: 2 children         Tobacco/Alcohol/Supplements:     Last Reviewed on 1/06/2018 09:30 AM by Diana Soler    Tobacco: He has never smoked.  Non-drinker         Substance Abuse History:     Last Reviewed on 1/06/2018 09:30 AM by Diana Soler        Mental Health History:     Last Reviewed on 1/06/2018 09:30 AM by Diana Soler        Communicable Diseases (eg STDs):     Last Reviewed on 1/06/2018 09:30 AM by Diana Soler            Current Problems:     Last Reviewed on 1/06/2018 09:30 AM by Diana Soler    Generalized osteoarthritis     Elevated cholesterol     Generalized OA     Osteoarthritis of knee     Hypertension     Upper respiratory illness     Screening for thyroid disorder     Screening for prostate cancer     Hand pain         Immunizations:     None        Allergies:     Last Reviewed on 1/06/2018 09:30 AM by Diana Soler      No Known Drug Allergies.         Current Medications:     Last Reviewed on 1/06/2018 09:30 AM by Diana Soler    Amlodipine  10mg Tablet Take 1 tablet(s) by mouth daily     Diclofenac Sodium 75mg Tablets, Enteric Coated Take 1 tablet(s) by mouth bid with food     Hydrochlorothiazide (HCTZ) 25mg Tablet Take 1 tablet(s) by mouth daily     Vitamin C 500mg Tablet 1 tab daily     Ibuprofen prn     Glucosamine 1,500mg Tablet Take 1 tablet(s) by mouth daily     Loratadine 10mg Tablet 1 tab daily         OBJECTIVE:        Vitals:         Current: 1/6/2018 8:52:45 AM    Ht:  5 ft, 7.5 in;  Wt: 300 lbs;  BMI: 46.3    T: 97 F (oral);  BP: 143/82 mm Hg (left arm, sitting);  P: 92 bpm (left arm (BP Cuff), sitting);  sCr: 1.04 mg/dL;  GFR: 84.31        Exams:     PHYSICAL EXAM:     GENERAL: Vitals recorded well developed, well nourished;  well groomed;  no apparent distress;     EYES: lids and lacrimal system are normal in appearance; extraocular movements intact; conjunctiva and cornea  are normal; PERRLA;     E/N/T: EARS: external auditory canal normal;  both TMs are bulging;  NOSE: nasal mucosa is erythematous;     NECK:  supple, full ROM; no thyromegaly; no carotid bruits;     RESPIRATORY: normal respiratory rate and pattern with no distress; normal breath sounds with no rales, rhonchi, wheezes or rubs;     CARDIOVASCULAR: normal rate; rhythm is regular;  normal S1; normal S2; no systolic murmur; no cyanosis; no edema;     SKIN:  no significant rashes or lesions; no suspicious moles;     NEUROLOGICAL:  cranial nerves, motor and sensory function, reflexes, gait and coordination are all intact;     PSYCHIATRIC:  appropriate affect and demeanor; normal speech pattern; grossly normal memory;         Lab/Test Results:             Influenza A and B:  Negative (01/06/2018),     Performed by::  herbert (01/06/2018),             ASSESSMENT:           465.8   J06.9  Upper respiratory illness              DDx:         ORDERS:         Meds Prescribed:       Bromfed-DM (Brompheniramine/Dextromethorphan/Pseudoephedrine) Syrup 1  to 2  tsp po every 4-6 hrs prn cough/congestion.  #240 ([address] [place] [address]) ml Refills: 0       Fluticasone Propionate 50mcg/1actuation Nasal Spray 1 spray each nostil daily  #1 (One) gm Refills: 2         Lab Orders:       [id number]  Infectious agent antigen detection by immunoassay; Influenza  (In-House)         48698-33  Infectious agent antigen detection by immunoassay; Influenza  (In-House)                   PLAN:          Upper respiratory illness         FOLLOW-UP: Advised to call if there is no improvement..   for Schedule with Dr. Daniel for chronic f/u. Pt states he is his primary. However, has not seen him since 2012. Chronic visit follow up School/Work Excuse for Print note stating pt has frequent urination and could pose a problem with him participating in jury duty.            Prescriptions:       Bromfed-DM (Brompheniramine/Dextromethorphan/Pseudoephedrine) Syrup 1  to 2   tsp po every 4-6 hrs prn cough/congestion.  #240 ([address] [place] [address]) ml Refills: 0       Fluticasone Propionate 50mcg/1actuation Nasal Spray 1 spray each nostil daily  #1 (One) gm Refills: 2           Orders:       [id number]  Infectious agent antigen detection by immunoassay; Influenza  (In-House)         86332-77  Infectious agent antigen detection by immunoassay; Influenza  (In-House)             Patient Education Handouts:       Cough              Patient Recommendations:        For  Upper respiratory illness:                     APPOINTMENT INFORMATION:        Monday Tuesday Wednesday Thursday Friday Saturday Sunday            Time:___________________AM  PM   Date:_____________________             CHARGE CAPTURE:           Primary Diagnosis:     465.8 Upper respiratory illness            J06.9    Acute upper respiratory infection, unspecified              Orders:          [id number]   Office/outpatient visit; established patient, level 3  (In-House)             [id number]   Infectious agent antigen detection by immunoassay; Influenza  (In-House)             [id number] -[id number]  Infectious agent antigen detection by immunoassay; Influenza  (In-House)

## 2021-05-18 NOTE — PROGRESS NOTES
Andrea Diaz  1955     Office/Outpatient Visit    Visit Date: Mon, Juan Francisco 15, 2020 11:32 am    Provider: Velasquez Daniel MD (Assistant: Trudi Hirsch RN)    Location: [facility]        Electronically signed by Velasquez Daniel MD on  06/17/2020 05:10:16 PM                             Subjective:        CC: diabetes follow up, blood pressure, cholesterol    HPI:           Patient to be evaluated for type 2 diabetes mellitus without complications.  He does not perform home blood glucose monitoring.  Most recent lab results include LDL:  57 (mg/dL) (06/10/2020), Hemoglobin A1c:  6.8 (%) (01/06/2020),  7.4 (%) (06/10/2020).            Concerning essential (primary) hypertension, his current cardiac medication regimen includes a diuretic ( Hydrodiuril ) and a calcium channel blocker ( Norvasc ).  Marcelino does not check his blood pressure other than at his clinic appointments.  He is tolerating the medication well without side effects.            With regard to the pure hypercholesterolemia, unspecified, current treatment includes Lipitor.  Compliance with treatment has been good; he takes his medication as directed and follows up as directed.  He denies experiencing any hypercholesterolemia related symptoms.      ROS:     CONSTITUTIONAL:  Negative for chills and fever.      CARDIOVASCULAR:  Negative for chest pain and palpitations.      RESPIRATORY:  Negative for recent cough and dyspnea.      GASTROINTESTINAL:  Negative for abdominal pain, nausea and vomiting.      INTEGUMENTARY:  Negative for atypical mole(s) and rash.          Past Medical History / Family History / Social History:         Last Reviewed on 6/15/2020 12:01 PM by Velasquez Daniel    Past Medical History:             PAST MEDICAL HISTORY         Hypertension     Chronic Pain: affecting the knees;     Osteoarthritis: primarily affecting the knees;             ADVANCED DIRECTIVES: None - His children would make  decisions if needed.         PREVENTIVE HEALTH MAINTENANCE             COLORECTAL CANCER SCREENING: Up to date (colonoscopy q10y; sigmoidoscopy q5y; Cologuard q3y) was last done 11/2017, Results are in chart; colonoscopy with normal results; hyperplastic polyp         Surgical History:         Joint Replacement: R Knee - August 2012 - [facility];     R Knee - Meniscus;    L Knee arthroplasty 2-2015;         Family History:         Positive for Coronary Artery Disease ( mother ).      Positive for Colon Cancer ( uncle ) and Pancreatric Cancer ( father ).      Positive for Type 2 Diabetes ( father ).          Social History:     Occupation:    Retired     Marital Status:      Children: 2 children         Tobacco/Alcohol/Supplements:     Last Reviewed on 6/15/2020 12:01 PM by Velasquez Daniel    Tobacco: He has never smoked.  Non-drinker         Substance Abuse History:     Last Reviewed on 6/15/2020 12:01 PM by Velasquez Daniel        Mental Health History:     Last Reviewed on 6/15/2020 12:01 PM by Velasquez Daniel        Communicable Diseases (eg STDs):     Last Reviewed on 6/15/2020 12:01 PM by Velasquez Daniel        Current Problems:     Last Reviewed on 6/15/2020 12:01 PM by Velasquez Daniel    Essential (primary) hypertension    Unilateral primary osteoarthritis, left knee    Primary generalized (osteo)arthritis    Pure hypercholesterolemia, unspecified    Encounter for screening for malignant neoplasm of prostate    Type 2 diabetes mellitus without complications        Immunizations:     Havrix -adult dose (HepA) 6/11/2018    Havrix -adult dose (HepA) 1/2/2019    Prevnar 13 (Pneumococcal PCV 13) 10/26/2018    Fluzone High-Dose pf (>=65 yr) 10/26/2018    Fluzone High-Dose pf (>=65 yr) 10/1/2019    PNEUMOVAX 23 (Pneumococcal PPV23) 10/1/2019    Shingrix (Zoster recombinant) 6/9/2018    Shingrix (Zoster recombinant) 8/29/2018    Zostavax (Zoster live) 10/9/2013         Allergies:     Last Reviewed on 6/15/2020 12:01 PM by Velasquez Daniel    No Known Allergies.        Current Medications:     Last Reviewed on 6/15/2020 12:01 PM by Velasquez Daniel    glucosamine HCl 1,500 mg oral tablet [Take 1 tablet(s) by mouth daily]    Loratadine 10 mg oral tablet [1 tab daily]    Amlodipine  10 mg oral tablet [Take 1 tablet(s) by mouth daily]    Diclofenac Sodium 75 mg oral tablet, delayed release (enteric coated) [Take 1 tablet(s) by mouth bid with food]    hydroCHLOROthiazide 25 mg oral tablet [Take 1 tablet(s) by mouth daily]    Ibuprofen prn     Vitamin C 500 mg oral tablet [1 tab daily]    atorvastatin 10 mg oral tablet [take 1 tablet (10 mg) by oral route once daily]    famotidine 20 mg oral tablet [take 1 tablet (20 mg) by oral route once daily at bedtime]        Objective:        Vitals:         Current: 6/15/2020 11:35:56 AM    Ht:  5 ft, 7.5 in;  Wt: 287.2 lbs;  BMI: 44.3T: 97.8 F (temporal);  BP: 146/75 mm Hg (left arm, sitting);  P: 51 bpm (left arm (BP Cuff), sitting);  sCr: 1.14 mg/dL;  GFR: 73.57        Exams:     PHYSICAL EXAM:     GENERAL: Vitals recorded well developed, obese;     NECK: range of motion is normal; thyroid is non-palpable;     RESPIRATORY: normal respiratory rate and pattern with no distress; normal breath sounds with no rales, rhonchi, wheezes or rubs;     CARDIOVASCULAR: normal rate; rhythm is regular;  no systolic murmur;     GASTROINTESTINAL: nontender; normal bowel sounds; no masses;     LYMPHATIC: no enlargement of cervical or facial nodes; no supraclavicular nodes;     SKIN:  no significant rashes or lesions; no suspicious moles;     NEUROLOGIC: mental status: alert and oriented x 3; cranial nerves II-XII grossly intact;     PSYCHIATRIC: appropriate affect and demeanor; normal psychomotor function;         Assessment:         E11.9   Type 2 diabetes mellitus without complications       I10   Essential (primary) hypertension       E78.00    Pure hypercholesterolemia, unspecified           ORDERS:         Radiology/Test Orders:       3017F  Colorectal CA screen results documented and reviewed (PV)  (In-House)              Other Orders:         Depression screen negative  (In-House)            1101F  Pt screen for fall risk; document no falls in past year or only 1 fall w/o injury in past year (PRINCESS)  (In-House)            1124F  Advance Care Planning discussed and doc in MR; no surrogate named or advance care plan provided  (Send-Out)                      Plan:         Type 2 diabetes mellitus without complications        RECOMMENDATIONS given include: Today, we have reviewed his care.  He is not wanting to use metformin yet.  We did identify some areas in his diet that he can make some changes - sugars, desserts, regular soda, sweet tea.  We will see how things go in the next few months prior to starting metformin.  His other medications are reviewed and no changes are anticipated..  MIPS PHQ-9 Depression Screening: Completed form scanned and in chart; Total Score 0; Negative Depression Screen           Orders:         Depression screen negative  (In-House)            1101F  Pt screen for fall risk; document no falls in past year or only 1 fall w/o injury in past year (PRINCESS)  (In-House)            3017F  Colorectal CA screen results documented and reviewed (PV)  (In-House)            1124F  Advance Care Planning discussed and doc in MR; no surrogate named or advance care plan provided  (Send-Out)              Essential (primary) hypertensionAs above.        Pure hypercholesterolemia, unspecifiedAs above.            Charge Capture:         Primary Diagnosis:     E11.9  Type 2 diabetes mellitus without complications           Orders:      76879  Office/outpatient visit; established patient, level 4  (In-House)              Depression screen negative  (In-House)            1101F  Pt screen for fall risk; document no falls in past year or only  1 fall w/o injury in past year (PRINCESS)  (In-House)            3017F  Colorectal CA screen results documented and reviewed (PV)  (In-House)              I10  Essential (primary) hypertension     E78.00  Pure hypercholesterolemia, unspecified

## 2021-05-18 NOTE — PROGRESS NOTES
Andrea Diaz  1955     Office/Outpatient Visit    Visit Date: Tue, Jan 5, 2021 08:48 am    Provider: Velasquez Daniel MD (Assistant: Jacqueline Singh MA)    Location: [facility]        Electronically signed by Velasquez Daniel MD on  01/06/2021 07:36:34 PM                             Subjective:        CC: diabetes, blood pressure, cholesterol, arthritis pain    HPI:           Patient presents with type 2 diabetes mellitus without complications.  Current meds include an oral hypoglycemic ( Glucophage XR ).  He does not perform home blood glucose monitoring.  Most recent lab results include LDL:  57 (mg/dL) (06/10/2020), Hemoglobin A1c:  6.5 (09/11/2020).            Concerning essential (primary) hypertension, his current cardiac medication regimen includes a diuretic ( Hydrodiuril ) and a calcium channel blocker ( Norvasc ).  Marcelino does not check his blood pressure other than at his clinic appointments.  He is tolerating the medication well without side effects.            Marcelino also has history of arthritis for which he takes diclofenac on a fairly regular basis.  He tolerates this well and denies acute issue.  He does see benefit from this medications.  We have previously discussed risks, but he is not having obvious side effect.          In regard to the pure hypercholesterolemia, unspecified, current treatment includes Lipitor.  Compliance with treatment has been good; he takes his medication as directed and follows up as directed.  He denies experiencing any hypercholesterolemia related symptoms.      ROS:     CONSTITUTIONAL:  Negative for chills and fever.      CARDIOVASCULAR:  Negative for chest pain and palpitations.      RESPIRATORY:  Negative for recent cough and dyspnea.      GASTROINTESTINAL:  Negative for abdominal pain, nausea and vomiting.      INTEGUMENTARY:  Negative for atypical mole(s) and rash.          Past Medical History / Family History / Social History:          Last Reviewed on 1/05/2021 09:41 AM by Velasquez Daniel    Past Medical History:             PAST MEDICAL HISTORY         Hypertension     Chronic Pain: affecting the knees;     Osteoarthritis: primarily affecting the knees;             ADVANCED DIRECTIVES: None - His children would make decisions if needed.         PREVENTIVE HEALTH MAINTENANCE             COLORECTAL CANCER SCREENING: Up to date (colonoscopy q10y; sigmoidoscopy q5y; Cologuard q3y) was last done 11/2017, Results are in chart; colonoscopy with normal results; hyperplastic polyp         Surgical History:         Joint Replacement: R Knee - August 2012 - [facility];     R Knee - Meniscus;    L Knee arthroplasty 2-2015;         Family History:         Positive for Coronary Artery Disease ( mother ).      Positive for Colon Cancer ( uncle ) and Pancreatric Cancer ( father ).      Positive for Type 2 Diabetes ( father ).          Social History:     Occupation:    Retired     Marital Status:      Children: 2 children         Tobacco/Alcohol/Supplements:     Last Reviewed on 1/05/2021 09:41 AM by Velasquez Daniel    Tobacco: He has never smoked.  Non-drinker         Substance Abuse History:     Last Reviewed on 1/05/2021 09:41 AM by Velasquez Daniel        Mental Health History:     Last Reviewed on 1/05/2021 09:41 AM by Velasquez Daniel        Communicable Diseases (eg STDs):     Last Reviewed on 1/05/2021 09:41 AM by Velasquez Daniel        Current Problems:     Last Reviewed on 1/05/2021 09:41 AM by Velasquez Daniel    Essential (primary) hypertension    Unilateral primary osteoarthritis, left knee    Primary generalized (osteo)arthritis    Pure hypercholesterolemia, unspecified    Encounter for screening for malignant neoplasm of prostate    Type 2 diabetes mellitus without complications    Encounter for immunization        Immunizations:     Havrix -adult dose (HepA) 6/11/2018    Havrix -adult dose  (HepA) 1/2/2019    Prevnar 13 (Pneumococcal PCV 13) 10/26/2018    Fluzone High-Dose pf (>=65 yr) 10/26/2018    Fluzone High-Dose pf (>=65 yr) 10/1/2019    PNEUMOVAX 23 (Pneumococcal PPV23) 10/1/2019    Shingrix (Zoster recombinant) 6/9/2018    Shingrix (Zoster recombinant) 8/29/2018    Zostavax (Zoster live) 10/9/2013        Allergies:     Last Reviewed on 1/05/2021 09:41 AM by Velasquez Daniel    No Known Allergies.        Current Medications:     Last Reviewed on 1/05/2021 09:41 AM by Velasquez Daniel    glucosamine HCl 1,500 mg oral tablet [Take 1 tablet(s) by mouth daily]    Loratadine 10 mg oral tablet [1 tab daily]    diclofenac sodium 75 mg oral tablet, delayed release (enteric coated) [TAKE 1 TABLET BY MOUTH TWICE DAILY WITH FOOD]    Amlodipine  10 mg oral tablet [Take 1 tablet(s) by mouth daily]    hydroCHLOROthiazide 25 mg oral tablet [TAKE 1 TABLET BY MOUTH DAILY]    amLODIPine 10 mg oral tablet [TAKE 1 TABLET BY MOUTH DAILY]    Ibuprofen prn     Vitamin C 500 mg oral tablet [1 tab daily]    atorvastatin 10 mg oral tablet [TAKE 1 TABLET BY MOUTH EVERY DAY]    famotidine 20 mg oral tablet [TAKE 1 TABLET BY MOUTH EVERY NIGHT AT BEDTIME]    metFORMIN 500 mg oral Tablet, Extended Release 24 hr [TAKE 2 TABLETS BY MOUTH DAILY]        Objective:        Vitals:         Current: 1/5/2021 8:53:21 AM    Ht:  5 ft, 7.5 in;  Wt: 279.2 lbs;  BMI: 43.1T: 97.1 F (temporal);  BP: 155/83 mm Hg (left arm, sitting);  P: 87 bpm (left arm (BP Cuff), sitting);  sCr: 1.14 mg/dL;  GFR: 71.75        Exams:     PHYSICAL EXAM:     GENERAL: Vitals recorded well developed,  moderately obese;     EYES: extraocular movements intact; conjunctiva and cornea are normal; PERRL;     NECK: range of motion is normal; thyroid is non-palpable;     RESPIRATORY: normal respiratory rate and pattern with no distress; normal breath sounds with no rales, rhonchi, wheezes or rubs;     CARDIOVASCULAR: normal rate; rhythm is regular;  no  systolic murmur;     GASTROINTESTINAL: nontender; normal bowel sounds; no masses;     LYMPHATIC: no enlargement of cervical or facial nodes; no supraclavicular nodes;     SKIN:  no significant rashes or lesions; no suspicious moles;     NEUROLOGIC: mental status: alert and oriented x 3; cranial nerves II-XII grossly intact;     PSYCHIATRIC: appropriate affect and demeanor; normal psychomotor function;         Assessment:         E11.9   Type 2 diabetes mellitus without complications       I10   Essential (primary) hypertension       M15.0   Primary generalized (osteo)arthritis       E78.00   Pure hypercholesterolemia, unspecified       Z23   Encounter for immunization       Z79.899   Other long term (current) drug therapy           ORDERS:         Meds Prescribed:       [Refilled] hydroCHLOROthiazide 25 mg oral tablet [TAKE 1 TABLET BY MOUTH DAILY], #90 (ninety) tablets, Refills: 1 (one)       [Refilled] atorvastatin 10 mg oral tablet [TAKE 1 TABLET BY MOUTH EVERY DAY], #90 (ninety) tablets, Refills: 1 (one)       [Refilled] famotidine 20 mg oral tablet [TAKE 1 TABLET BY MOUTH EVERY NIGHT AT BEDTIME], #90 (ninety) tablets, Refills: 1 (one)       [Refilled] amLODIPine 10 mg oral tablet [TAKE 1 TABLET BY MOUTH DAILY], #90 (ninety) tablets, Refills: 1 (one)       [Refilled] diclofenac sodium 75 mg oral tablet, delayed release (enteric coated) [TAKE 1 TABLET BY MOUTH TWICE DAILY WITH FOOD], #180 (one hundred and eighty) tablets, Refills: 1 (one)       [Refilled] metFORMIN 500 mg oral Tablet, Extended Release 24 hr [TAKE 2 TABLETS BY MOUTH DAILY], #180 (one hundred and eighty) tablets, Refills: 1 (one)         Radiology/Test Orders:       3017F  Colorectal CA screen results documented and reviewed (PV)  (In-House)              Lab Orders:       [id number]  DIAB2 - [facility] CMP A1C LIPID AND MICRO ALBUM CR RATIO: [id number]  (Send-Out)            [id number]  BDCB2 - [facility] CBC w/o diff  (Send-Out)              Procedures  Ordered:       [id number]  Fluzone High Dose  (In-House)              Other Orders:       1101F  Pt screen for fall risk; document no falls in past year or only 1 fall w/o injury in past year (PRINCESS)  (In-House)            1124F  Advance Care Planning discussed and doc in MR; no surrogate named or advance care plan provided  (Send-Out)              Administration of influenza virus vaccine  (x1)                  Plan:         Type 2 diabetes mellitus without complications    LABORATORY:  Labs ordered to be performed today include Diabetes Panel 2;CMP, A1C, Lipid, Microalbumin:Creatinine Ratio.      RECOMMENDATIONS given include: Today, we have reviewed Marcelino's care.  His health issues seem mostly stable.  We will update labs as noted below.  I am going to check a CBC given the use of diclofenac.  He has struggled emotionally since the death of his wife about a year ago.  He declines any depression medication at this time.  We will see how he does moving forward.  Follow closely..  MIRNA Has had no falls or only one fall without injury in the past year Vaccines Flu and Pneumonia updated in Shot record Colorectal Cancer Screening is up to date and the results are in the chart Advance Directive/Surrogate Decision Maker discussed and pt declines to complete today           Prescriptions:       [Refilled] hydroCHLOROthiazide 25 mg oral tablet [TAKE 1 TABLET BY MOUTH DAILY], #90 (ninety) tablets, Refills: 1 (one)       [Refilled] atorvastatin 10 mg oral tablet [TAKE 1 TABLET BY MOUTH EVERY DAY], #90 (ninety) tablets, Refills: 1 (one)       [Refilled] famotidine 20 mg oral tablet [TAKE 1 TABLET BY MOUTH EVERY NIGHT AT BEDTIME], #90 (ninety) tablets, Refills: 1 (one)       [Refilled] amLODIPine 10 mg oral tablet [TAKE 1 TABLET BY MOUTH DAILY], #90 (ninety) tablets, Refills: 1 (one)       [Refilled] diclofenac sodium 75 mg oral tablet, delayed release (enteric coated) [TAKE 1 TABLET BY MOUTH TWICE DAILY WITH FOOD], #180 (one  hundred and eighty) tablets, Refills: 1 (one)       [Refilled] metFORMIN 500 mg oral Tablet, Extended Release 24 hr [TAKE 2 TABLETS BY MOUTH DAILY], #180 (one hundred and eighty) tablets, Refills: 1 (one)           Orders:       [id number]  DIAB[address] [place] [address] CMP A1C LIPID AND MICRO ALBUM CR RATIO: [id number]  (Send-Out)            1101F  Pt screen for fall risk; document no falls in past year or only 1 fall w/o injury in past year (PRINCESS)  (In-House)            3017F  Colorectal CA screen results documented and reviewed (PV)  (In-House)            1124F  Advance Care Planning discussed and doc in MR; no surrogate named or advance care plan provided  (Send-Out)              Essential (primary) hypertensionAs above.        Primary generalized (osteo)arthritisAs above.        Pure hypercholesterolemia, unspecifiedAs above.        Encounter for immunization          Immunizations:       06248  Fluzone High Dose  (In-House)                Dose (ml): 0.7  Site: left deltoid  Route: intramuscular  Administered by: Jacqueline Singh          : [employer]  Lot #: [id number]  Exp: 06/30/2021          NDC: [address]-39        Administration of influenza virus vaccine  (x1)          Other long term (current) drug therapy    LABORATORY:  Labs ordered to be performed today include CBC W/O DIFF.            Orders:       [id number]  [address] CBC w/o diff  (Send-Out)                  Charge Capture:         Primary Diagnosis:     E11.9  Type 2 diabetes mellitus without complications           Orders:      [id number]  Office/outpatient visit; established patient, level 4  (In-House)            1101F  Pt screen for fall risk; document no falls in past year or only 1 fall w/o injury in past year (PRINCESS)  (In-House)            3017F  Colorectal CA screen results documented and reviewed (PV)  (In-House)              I10  Essential (primary) hypertension     M15.0  Primary generalized (osteo)arthritis     E78.00  Pure  hypercholesterolemia, unspecified     Z23  Encounter for immunization           Orders:      [id number]  Fluzone High Dose  (In-House)              Administration of influenza virus vaccine  (x1)          Z79.899  Other long term (current) drug therapy

## 2021-05-18 NOTE — PROGRESS NOTES
DiazAndrea machado 1955     Office/Outpatient Visit    Visit Date: Tue, Jul 10, 2018 02:28 pm    Provider: Terese Smith N.P. (Assistant: Trice Menjivar MA)    Location: [facility]        Electronically signed by Terese Smith N.P. on  07/11/2018 12:36:29 PM                             SUBJECTIVE:        CC:     Marcelino is a 62 year old White male.  Right foot Pain         HPI:         Foot pain noted.  Marcelino presents for evaluation of pain.  This pain is localized to the right foot leg.  It began years ago.  The onset of pain occurred with no apparent trigger.  He characterizes it as aching.  Aggravating factors include walking.  he has been working longer hours on his feet lately.  He has not had an injury to the foot previously - he has had bilateral knee replacements sometime back     ROS:     CONSTITUTIONAL:  Negative for chills, fatigue, fever and weight change.      CARDIOVASCULAR:  Negative for chest pain, orthopnea, paroxysmal nocturnal dyspnea and pedal edema.      RESPIRATORY:  Negative for dyspnea and cough.      GASTROINTESTINAL:  Negative for abdominal pain, heartburn, constipation, diarrhea, and stool changes.      MUSCULOSKELETAL:  Positive for limb pain ( right dorsal surface foot ).      PSYCHIATRIC:  Negative for anxiety and depression.          PM/FM/SH:     Last Reviewed on 6/21/2018 04:43 PM by Velasquez Daniel    Past Medical History:         Hypertension     Chronic Pain: affecting the knees;     Osteoarthritis: primarily affecting the knees;         PREVENTIVE HEALTH MAINTENANCE             COLORECTAL CANCER SCREENING: Up to date (colonoscopy q10y; sigmoidoscopy q5y; Cologuard q3y) was last done 11/2017, Results are in chart; colonoscopy with normal results; hyperplastic polyp         Surgical History:         Joint Replacement: R Knee - August 2012 - [facility];      R Knee - Meniscus;    L Knee arthroplasty 2-2015;         Family History:         Positive for  Coronary Artery Disease ( mother ).      Positive for Colon Cancer ( uncle ) and Pancreatric Cancer ( father ).      Positive for Type 2 Diabetes ( father ).          Social History:     Occupation: Teller     Marital Status:      Children: 2 children         Tobacco/Alcohol/Supplements:     Last Reviewed on 7/10/2018 02:41 PM by Trice Menjivar    Tobacco: He has never smoked.  Non-drinker         Substance Abuse History:     Last Reviewed on 6/21/2018 04:43 PM by Velasquez Daniel        Mental Health History:     Last Reviewed on 6/21/2018 04:43 PM by Velasquez Daniel        Communicable Diseases (eg STDs):     Last Reviewed on 6/21/2018 04:43 PM by Velasquez Daniel            Current Problems:     Last Reviewed on 6/21/2018 04:43 PM by Velasquez Daniel    Generalized osteoarthritis     Elevated cholesterol     Generalized OA     Osteoarthritis of knee     Hypertension     Foot pain         Immunizations:     Havrix -adult dose (HepA) 6/11/2018     Havrix -adult dose (HepA) 6/9/2018     Shingrix (Zoster subunit) 6/9/2018         Allergies:     Last Reviewed on 7/10/2018 02:41 PM by Trice Menjivar      No Known Drug Allergies.         Current Medications:     Last Reviewed on 7/10/2018 02:41 PM by Trice Menjivar    Amlodipine  10mg Tablet Take 1 tablet(s) by mouth daily     Diclofenac Sodium 75mg Tablets, Enteric Coated Take 1 tablet(s) by mouth bid with food     Hydrochlorothiazide (HCTZ) 25mg Tablet Take 1 tablet(s) by mouth daily     Loratadine 10mg Tablet 1 tab daily     Vitamin C 500mg Tablet 1 tab daily     Ibuprofen prn     Glucosamine 1,500mg Tablet Take 1 tablet(s) by mouth daily         OBJECTIVE:        Vitals:         Current: 7/10/2018 2:40:03 PM    Ht:  5 ft, 7.5 in;  Wt: 301 lbs;  BMI: 46.4    T: 98.4 F (oral);  BP: 132/78 mm Hg (left arm, sitting);  P: 97 bpm (left arm (BP Cuff), sitting);  sCr: 1.04 mg/dL;  GFR: 84.43        Exams:     PHYSICAL EXAM:     GENERAL:  Vitals recorded well developed, well nourished;  well groomed;  no apparent distress;     NECK:  supple, full ROM; no thyromegaly; no carotid bruits;     RESPIRATORY: normal respiratory rate and pattern with no distress; normal breath sounds with no rales, rhonchi, wheezes or rubs;     CARDIOVASCULAR: normal rate; rhythm is regular;  normal S1; normal S2; no systolic murmur; no cyanosis; no edema;     MUSCULOSKELETAL: gait: affected by a right leg limp;  normal range of motion of all major muscle groups; pain with range of motion in: right ankle flexion, internal rotation, and dorsiflexion;     NEUROLOGICAL:  cranial nerves, motor and sensory function, reflexes, gait and coordination are all intact;     PSYCHIATRIC:  appropriate affect and demeanor; normal speech pattern; grossly normal memory;         ASSESSMENT:           729.5   M79.671  Foot pain              DDx:         ORDERS:         Radiology/Test Orders:       [id number]  Right radiologic examination, foot; complete, minimum of three views  (Send-Out)                   PLAN:          Foot pain         RADIOLOGY:  I have ordered a right foot x-ray to be done today.      RECOMMENDATIONS given include: Patient would like injection of steroids- instructed that we do not have anyone available for foot injections - that is typically referred to podiatry - he would like to go to previous ortho MD- I am uncertain if he provides treatment for foot problems ; We will call their office once xray obtained otherwise, will defer to podiatry  - He and his wife will let us know whom they prefer - Do not want Dr. Lopez.      FOLLOW-UP:.   Chronic visit follow up           Orders:       [id number]  Right radiologic examination, foot; complete, minimum of three views  (Send-Out)               Patient Recommendations:        For  Foot pain:     I also recommend Patient would like injection of steroids- instructed that we do not have anyone available for foot injections - that  is typically referred to podiatry - he would like to go to previous ortho MD- I am uncertain if he provides treatment for foot problems ; We will call their office once xray obtained otherwise, will defer to podiatry  - He and his wife will let us know whom they prefer - Do not want Dr. Lopez.                  APPOINTMENT INFORMATION:        Monday Tuesday Wednesday Thursday Friday Saturday Sunday            Time:___________________AM  PM   Date:_____________________             CHARGE CAPTURE:           Primary Diagnosis:     729.5 Foot pain            M79.671    Pain in right foot              Orders:          [id number]   Office/outpatient visit; established patient, level 3  (In-House)

## 2021-07-01 VITALS
DIASTOLIC BLOOD PRESSURE: 78 MMHG | BODY MASS INDEX: 45.62 KG/M2 | HEART RATE: 97 BPM | WEIGHT: 301 LBS | TEMPERATURE: 98.4 F | HEIGHT: 68 IN | SYSTOLIC BLOOD PRESSURE: 132 MMHG

## 2021-07-01 VITALS
TEMPERATURE: 98 F | DIASTOLIC BLOOD PRESSURE: 77 MMHG | WEIGHT: 299.5 LBS | HEIGHT: 68 IN | BODY MASS INDEX: 45.39 KG/M2 | SYSTOLIC BLOOD PRESSURE: 136 MMHG | HEART RATE: 67 BPM

## 2021-07-01 VITALS
TEMPERATURE: 98.5 F | HEART RATE: 88 BPM | HEIGHT: 68 IN | SYSTOLIC BLOOD PRESSURE: 126 MMHG | BODY MASS INDEX: 43.32 KG/M2 | WEIGHT: 285.8 LBS | DIASTOLIC BLOOD PRESSURE: 70 MMHG

## 2021-07-01 VITALS
HEIGHT: 68 IN | SYSTOLIC BLOOD PRESSURE: 144 MMHG | WEIGHT: 294 LBS | BODY MASS INDEX: 44.56 KG/M2 | TEMPERATURE: 98.1 F | DIASTOLIC BLOOD PRESSURE: 82 MMHG | HEART RATE: 68 BPM

## 2021-07-01 VITALS
BODY MASS INDEX: 45.47 KG/M2 | DIASTOLIC BLOOD PRESSURE: 82 MMHG | WEIGHT: 300 LBS | HEART RATE: 92 BPM | SYSTOLIC BLOOD PRESSURE: 143 MMHG | HEIGHT: 68 IN | TEMPERATURE: 97 F

## 2021-07-01 VITALS
HEIGHT: 68 IN | SYSTOLIC BLOOD PRESSURE: 136 MMHG | BODY MASS INDEX: 44.28 KG/M2 | HEART RATE: 77 BPM | WEIGHT: 292.2 LBS | DIASTOLIC BLOOD PRESSURE: 80 MMHG | TEMPERATURE: 97.9 F

## 2021-07-01 VITALS
WEIGHT: 301.8 LBS | SYSTOLIC BLOOD PRESSURE: 144 MMHG | DIASTOLIC BLOOD PRESSURE: 71 MMHG | HEIGHT: 68 IN | HEART RATE: 105 BPM | TEMPERATURE: 99.3 F | BODY MASS INDEX: 45.74 KG/M2

## 2021-07-01 VITALS
BODY MASS INDEX: 43.62 KG/M2 | TEMPERATURE: 98.6 F | HEART RATE: 95 BPM | HEIGHT: 68 IN | WEIGHT: 287.8 LBS | SYSTOLIC BLOOD PRESSURE: 129 MMHG | DIASTOLIC BLOOD PRESSURE: 63 MMHG

## 2021-07-02 VITALS
BODY MASS INDEX: 42.31 KG/M2 | HEART RATE: 87 BPM | SYSTOLIC BLOOD PRESSURE: 155 MMHG | HEIGHT: 68 IN | TEMPERATURE: 97.1 F | DIASTOLIC BLOOD PRESSURE: 83 MMHG | WEIGHT: 279.2 LBS

## 2021-07-02 VITALS
DIASTOLIC BLOOD PRESSURE: 75 MMHG | BODY MASS INDEX: 43.53 KG/M2 | SYSTOLIC BLOOD PRESSURE: 146 MMHG | TEMPERATURE: 97.8 F | HEIGHT: 68 IN | HEART RATE: 51 BPM | WEIGHT: 287.2 LBS

## 2021-07-07 RX ORDER — ATORVASTATIN CALCIUM 10 MG/1
TABLET, FILM COATED ORAL
Qty: 90 TABLET | Refills: 0 | Status: SHIPPED | OUTPATIENT
Start: 2021-07-07 | End: 2021-07-30 | Stop reason: SDUPTHER

## 2021-07-07 RX ORDER — DICLOFENAC SODIUM 75 MG/1
TABLET, DELAYED RELEASE ORAL
Qty: 180 TABLET | Refills: 0 | Status: SHIPPED | OUTPATIENT
Start: 2021-07-07 | End: 2021-10-05

## 2021-07-07 RX ORDER — METFORMIN HYDROCHLORIDE 500 MG/1
TABLET, EXTENDED RELEASE ORAL
Qty: 180 TABLET | Refills: 0 | Status: SHIPPED | OUTPATIENT
Start: 2021-07-07 | End: 2021-07-30 | Stop reason: SDUPTHER

## 2021-07-07 RX ORDER — FAMOTIDINE 20 MG/1
TABLET, FILM COATED ORAL
Qty: 90 TABLET | Refills: 0 | Status: SHIPPED | OUTPATIENT
Start: 2021-07-07 | End: 2021-07-30 | Stop reason: SDUPTHER

## 2021-07-08 NOTE — TELEPHONE ENCOUNTER
I have gone ahead and sent a refill on these medicines for 90 days.  However, we need to see him every 6 months given his problems.  Please schedule follow-up.  Thanks.

## 2021-07-30 ENCOUNTER — OFFICE VISIT (OUTPATIENT)
Dept: FAMILY MEDICINE CLINIC | Age: 66
End: 2021-07-30

## 2021-07-30 ENCOUNTER — LAB (OUTPATIENT)
Dept: LAB | Facility: HOSPITAL | Age: 66
End: 2021-07-30

## 2021-07-30 VITALS
TEMPERATURE: 98.1 F | HEART RATE: 76 BPM | DIASTOLIC BLOOD PRESSURE: 79 MMHG | SYSTOLIC BLOOD PRESSURE: 132 MMHG | BODY MASS INDEX: 42.5 KG/M2 | HEIGHT: 68 IN | WEIGHT: 280.4 LBS

## 2021-07-30 DIAGNOSIS — Z12.5 SCREENING FOR PROSTATE CANCER: ICD-10-CM

## 2021-07-30 DIAGNOSIS — E78.00 PURE HYPERCHOLESTEROLEMIA: ICD-10-CM

## 2021-07-30 DIAGNOSIS — I10 ESSENTIAL (PRIMARY) HYPERTENSION: ICD-10-CM

## 2021-07-30 DIAGNOSIS — E11.9 TYPE 2 DIABETES MELLITUS WITHOUT COMPLICATION, WITHOUT LONG-TERM CURRENT USE OF INSULIN (HCC): Primary | ICD-10-CM

## 2021-07-30 DIAGNOSIS — Z11.59 NEED FOR HEPATITIS C SCREENING TEST: ICD-10-CM

## 2021-07-30 DIAGNOSIS — E11.9 TYPE 2 DIABETES MELLITUS WITHOUT COMPLICATION, WITHOUT LONG-TERM CURRENT USE OF INSULIN (HCC): ICD-10-CM

## 2021-07-30 LAB
ALBUMIN SERPL-MCNC: 4.6 G/DL (ref 3.5–5.2)
ALBUMIN/GLOB SERPL: 1.4 G/DL
ALP SERPL-CCNC: 70 U/L (ref 39–117)
ALT SERPL W P-5'-P-CCNC: 27 U/L (ref 1–41)
ANION GAP SERPL CALCULATED.3IONS-SCNC: 10.9 MMOL/L (ref 5–15)
AST SERPL-CCNC: 15 U/L (ref 1–40)
BILIRUB SERPL-MCNC: 0.8 MG/DL (ref 0–1.2)
BUN SERPL-MCNC: 25 MG/DL (ref 8–23)
BUN/CREAT SERPL: 24 (ref 7–25)
CALCIUM SPEC-SCNC: 9.7 MG/DL (ref 8.6–10.5)
CHLORIDE SERPL-SCNC: 100 MMOL/L (ref 98–107)
CO2 SERPL-SCNC: 24.1 MMOL/L (ref 22–29)
CREAT SERPL-MCNC: 1.04 MG/DL (ref 0.76–1.27)
GFR SERPL CREATININE-BSD FRML MDRD: 71 ML/MIN/1.73
GLOBULIN UR ELPH-MCNC: 3.2 GM/DL
GLUCOSE SERPL-MCNC: 142 MG/DL (ref 65–99)
HBA1C MFR BLD: 6.7 % (ref 4.8–5.6)
HCV AB SER DONR QL: NORMAL
POTASSIUM SERPL-SCNC: 4.1 MMOL/L (ref 3.5–5.2)
PROT SERPL-MCNC: 7.8 G/DL (ref 6–8.5)
PSA SERPL-MCNC: 0.49 NG/ML (ref 0–4)
SODIUM SERPL-SCNC: 135 MMOL/L (ref 136–145)
TSH SERPL DL<=0.05 MIU/L-ACNC: 2.2 UIU/ML (ref 0.27–4.2)

## 2021-07-30 PROCEDURE — 83036 HEMOGLOBIN GLYCOSYLATED A1C: CPT

## 2021-07-30 PROCEDURE — 36415 COLL VENOUS BLD VENIPUNCTURE: CPT

## 2021-07-30 PROCEDURE — G0103 PSA SCREENING: HCPCS

## 2021-07-30 PROCEDURE — 99214 OFFICE O/P EST MOD 30 MIN: CPT | Performed by: FAMILY MEDICINE

## 2021-07-30 PROCEDURE — 84443 ASSAY THYROID STIM HORMONE: CPT

## 2021-07-30 PROCEDURE — 80053 COMPREHEN METABOLIC PANEL: CPT

## 2021-07-30 PROCEDURE — 86803 HEPATITIS C AB TEST: CPT

## 2021-07-30 RX ORDER — FAMOTIDINE 20 MG/1
20 TABLET, FILM COATED ORAL
Qty: 90 TABLET | Refills: 1 | Status: SHIPPED | OUTPATIENT
Start: 2021-07-30 | End: 2022-03-15 | Stop reason: SDUPTHER

## 2021-07-30 RX ORDER — AMLODIPINE BESYLATE 10 MG/1
10 TABLET ORAL DAILY
Qty: 90 TABLET | Refills: 1 | Status: SHIPPED | OUTPATIENT
Start: 2021-07-30 | End: 2022-02-24

## 2021-07-30 RX ORDER — METFORMIN HYDROCHLORIDE 500 MG/1
1000 TABLET, EXTENDED RELEASE ORAL DAILY
Qty: 180 TABLET | Refills: 1 | Status: SHIPPED | OUTPATIENT
Start: 2021-07-30 | End: 2022-03-15 | Stop reason: SDUPTHER

## 2021-07-30 RX ORDER — ATORVASTATIN CALCIUM 10 MG/1
10 TABLET, FILM COATED ORAL DAILY
Qty: 90 TABLET | Refills: 1 | Status: SHIPPED | OUTPATIENT
Start: 2021-07-30 | End: 2022-03-15 | Stop reason: SDUPTHER

## 2021-07-30 RX ORDER — HYDROCHLOROTHIAZIDE 25 MG/1
25 TABLET ORAL DAILY
Qty: 90 TABLET | Refills: 1 | Status: SHIPPED | OUTPATIENT
Start: 2021-07-30 | End: 2022-03-04

## 2021-07-30 NOTE — PROGRESS NOTES
Andrea Diaz presents to [facility].    Chief Complaint:  Diabetes follow up, blood pressure, cholesterol    Subjective       History of Present Illness:  Marcelino is in today for follow-up on his usual care.  He has a history of type 2 diabetes for which he currently takes Metformin.  He does still need to take this.  His most recent A1c and other labs are reviewed.    He also has hypertension and elevated cholesterol for which he remains on treatment.  He is tolerating his medications fairly well and denies acute issue.      Review of Systems:  Review of Systems   Constitutional: Negative for chills and fever.   Respiratory: Negative for cough and shortness of breath.    Cardiovascular: Negative for chest pain and palpitations.   Gastrointestinal: Negative for abdominal pain, nausea and vomiting.        Objective   Medical History:  Past Medical History:   • Essential (primary) hypertension   • Other long term (current) drug therapy   • Primary generalized (osteo)arthritis   • Pure hypercholesterolemia   • Type 2 diabetes mellitus without complication (CMS/HCC)   • Unilateral primary osteoarthritis, left knee     Past Surgical History:   • COLONOSCOPY    Procedure: COLONOSCOPY TO CECUM WITH COLD POLYPECTOMY;  Surgeon: Bridgette Jefferson MD;  Location: [facility] ENDOSCOPY;  Service:    • JOINT REPLACEMENT    knee   • KNEE ARTHROPLASTY   • KNEE SURGERY    meniscus      Family History   Problem Relation Age of Onset   • Coronary artery disease Mother    • Pancreatic cancer Father    • Diabetes type II Father    • Colon cancer Other      Social History     Tobacco Use   • Smoking status: Never Smoker   • Smokeless tobacco: Never Used   Substance Use Topics   • Alcohol use: Not Currently       Health Maintenance Due   Topic Date Due   • TDAP/TD VACCINES (1 - Tdap) Never done   • HEPATITIS C SCREENING  Never done   • ANNUAL WELLNESS VISIT  Never done   • DIABETIC FOOT EXAM  Never done   •  "DIABETIC EYE EXAM  Never done   • ZOSTER VACCINE (3 of 3) 10/24/2018   • HEMOGLOBIN A1C  07/05/2021        Immunization History   Administered Date(s) Administered   • COVID-19 (PFIZER) 03/17/2021, 03/17/2021, 04/07/2021, 04/07/2021   • Hepatitis A 06/09/2018, 06/11/2018, 01/02/2019   • Influenza, Unspecified 01/05/2021   • Pneumococcal Conjugate 13-Valent (PCV13) 10/26/2018   • Pneumococcal Polysaccharide (PPSV23) 10/01/2019   • Shingrix 06/09/2018, 06/09/2018   • Zostavax 10/09/2013, 08/29/2018       No Known Allergies     Medications:  Current Outpatient Medications on File Prior to Visit   Medication Sig   • diclofenac (VOLTAREN) 75 MG EC tablet TAKE 1 TABLET BY MOUTH TWICE DAILY WITH FOOD   • glucosamine sulfate 500 MG capsule capsule Take  by mouth 2 (Two) Times a Day With Meals.   • [DISCONTINUED] amLODIPine (NORVASC) 10 MG tablet Take 10 mg by mouth Daily.   • [DISCONTINUED] atorvastatin (LIPITOR) 10 MG tablet TAKE 1 TABLET BY MOUTH EVERY DAY   • [DISCONTINUED] famotidine (PEPCID) 20 MG tablet TAKE 1 TABLET BY MOUTH EVERY NIGHT AT BEDTIME   • [DISCONTINUED] hydrochlorothiazide (HYDRODIURIL) 25 MG tablet Take 25 mg by mouth Daily.   • [DISCONTINUED] metFORMIN ER (GLUCOPHAGE-XR) 500 MG 24 hr tablet TAKE 2 TABLETS BY MOUTH DAILY     No current facility-administered medications on file prior to visit.       Vital Signs:   /77 (BP Location: Right arm, Patient Position: Sitting)   Pulse 91   Temp 98.1 °F (36.7 °C) (Oral)   Ht 171.5 cm (67.52\")   Wt 127 kg (280 lb 6.4 oz)   BMI 43.24 kg/m²       Physical Exam:  Physical Exam  Vitals reviewed.   Constitutional:       General: He is not in acute distress.     Appearance: He is obese. He is not ill-appearing.   Eyes:      Pupils: Pupils are equal, round, and reactive to light.   Neck:      Comments: No thyromegaly  Cardiovascular:      Rate and Rhythm: Normal rate and regular rhythm.   Pulmonary:      Effort: Pulmonary effort is normal.      Breath sounds: " Normal breath sounds.   Abdominal:      General: There is no distension.      Palpations: Abdomen is soft.      Tenderness: There is no abdominal tenderness.   Musculoskeletal:      Cervical back: Neck supple.   Lymphadenopathy:      Cervical: No cervical adenopathy.   Skin:     Findings: No lesion or rash.   Neurological:      Mental Status: He is alert.         Result Review      The following data was reviewed by Velasquez Daniel MD on 07/30/2021.  Lab Results   Component Value Date    WBC 6.88 01/05/2021    HGB 15.00 01/05/2021    HCT 42.8 01/05/2021    MCV 86.5 01/05/2021    .00 01/05/2021     Lab Results   Component Value Date    GLUCOSE 136 (H) 02/18/2015    BUN 21 01/05/2021    CREATININE 1.12 01/05/2021    BCR 19 01/05/2021    K 4.4 01/05/2021    CO2 26 01/05/2021    CALCIUM 9.5 01/05/2021    ALBUMIN 4.2 01/05/2021    LABIL2 1.2 (L) 01/05/2021    AST 17 01/05/2021    ALT 28 01/05/2021     Lab Results   Component Value Date    CHLPL 136 01/05/2021    TRIG 121 01/05/2021    HDL 37 (L) 01/05/2021    LDL 75 01/05/2021     Lab Results   Component Value Date    TSH 1.630 02/02/2019     Lab Results   Component Value Date    HGBA1C 6.6 (H) 01/05/2021     Lab Results   Component Value Date    PSA 0.50 06/10/2020    PSA 0.43 02/02/2019            Assessment and Plan:   Today, we have again reviewed his care.  We will update his refills and check labs as noted.  His blood pressure is a little bit elevated, and it will be repeated.  Consider whether additional treatments are necessary.  He is also continue to struggle emotionally since the death of his wife.  We talked about possibly taking something for depression, but he does not want to do that.  He denies any suicidal intention.       Diagnoses and all orders for this visit:    1. Type 2 diabetes mellitus without complication, without long-term current use of insulin (CMS/[facility]) (Primary)  -     Comprehensive Metabolic Panel; Future  -     Hemoglobin  A1c; Future  -     TSH; Future  -     metFORMIN ER (GLUCOPHAGE-XR) 500 MG 24 hr tablet; Take 2 tablets by mouth Daily.  Dispense: 180 tablet; Refill: 1    2. Essential (primary) hypertension  -     amLODIPine (NORVASC) 10 MG tablet; Take 1 tablet by mouth Daily.  Dispense: 90 tablet; Refill: 1  -     hydroCHLOROthiazide (HYDRODIURIL) 25 MG tablet; Take 1 tablet by mouth Daily.  Dispense: 90 tablet; Refill: 1    3. Pure hypercholesterolemia  -     TSH; Future  -     atorvastatin (LIPITOR) 10 MG tablet; Take 1 tablet by mouth Daily.  Dispense: 90 tablet; Refill: 1    4. Need for hepatitis C screening test  -     Hepatitis C Antibody; Future    5. Screening for prostate cancer  -     PSA Screen; Future    Other orders  -     famotidine (PEPCID) 20 MG tablet; Take 1 tablet by mouth every night at bedtime.  Dispense: 90 tablet; Refill: 1          Follow Up   Return in about 6 months (around 1/30/2022).  Patient was given instructions and counseling regarding his condition or for health maintenance advice. Please see specific information pulled into the AVS if appropriate.

## 2021-09-17 ENCOUNTER — OFFICE VISIT (OUTPATIENT)
Dept: FAMILY MEDICINE CLINIC | Age: 66
End: 2021-09-17

## 2021-09-17 VITALS
WEIGHT: 283.8 LBS | BODY MASS INDEX: 43.77 KG/M2 | HEART RATE: 96 BPM | OXYGEN SATURATION: 96 % | TEMPERATURE: 98.4 F | DIASTOLIC BLOOD PRESSURE: 71 MMHG | SYSTOLIC BLOOD PRESSURE: 138 MMHG

## 2021-09-17 DIAGNOSIS — Z00.00 PHYSICAL EXAM: Primary | ICD-10-CM

## 2021-09-17 PROCEDURE — G0438 PPPS, INITIAL VISIT: HCPCS | Performed by: FAMILY MEDICINE

## 2021-09-17 RX ORDER — MULTIVIT WITH MINERALS/LUTEIN
500 TABLET ORAL DAILY
COMMUNITY
End: 2022-11-14

## 2021-09-17 RX ORDER — LORATADINE 10 MG/1
CAPSULE, LIQUID FILLED ORAL
COMMUNITY
End: 2022-11-14

## 2021-09-17 RX ORDER — DICLOFENAC SODIUM 75 MG/1
75 TABLET, DELAYED RELEASE ORAL 2 TIMES DAILY
COMMUNITY
End: 2021-09-17 | Stop reason: SDUPTHER

## 2021-09-17 NOTE — PATIENT INSTRUCTIONS
Advance Care Planning and Advance Directives     You make decisions on a daily basis - decisions about where you want to live, your career, your home, your life. Perhaps one of the most important decisions you face is your choice for future medical care. Take time to talk with your family and your healthcare team and start planning today.  Advance Care Planning is a process that can help you:  · Understand possible future healthcare decisions in light of your own experiences  · Reflect on those decision in light of your goals and values  · Discuss your decisions with those closest to you and the healthcare professionals that care for you  · Make a plan by creating a document that reflects your wishes    Surrogate Decision Maker  In the event of a medical emergency, which has left you unable to communicate or to make your own decisions, you would need someone to make decisions for you.  It is important to discuss your preferences for medical treatment with this person while you are in good health.     Qualities of a surrogate decision maker:  • Willing to take on this role and responsibility  • Knows what you want for future medical care  • Willing to follow your wishes even if they don't agree with them  • Able to make difficult medical decisions under stressful circumstances    Advance Directives  These are legal documents you can create that will guide your healthcare team and decision maker(s) when needed. These documents can be stored in the electronic medical record.    · Living Will - a legal document to guide your care if you have a terminal condition or a serious illness and are unable to communicate. States vary by statute in document names/types, but most forms may include one or more of the following:        -  Directions regarding life-prolonging treatments        -  Directions regarding artificially provided nutrition/hydration        -  Choosing a healthcare decision maker        -  Direction  regarding organ/tissue donation    · Durable Power of  for Healthcare - this document names an -in-fact to make medical decisions for you, but it may also allow this person to make personal and financial decisions for you. Please seek the advice of an  if you need this type of document.    **Advance Directives are not required and no one may discriminate against you if you do not sign one.    Medical Orders  Many states allow specific forms/orders signed by your physician to record your wishes for medical treatment in your current state of health. This form, signed in personal communication with your physician, addresses resuscitation and other medical interventions that you may or may not want.      For more information or to schedule a time with a [facility] Advance Care Planning Facilitator contact: [facility] or call [phone] and someone will contact you directly.    Check on insurance coverage and cost for Shingrix (newest shingles vaccine) and get the immunization at your local pharmacy. It is more effective than the old Zostavax vaccine and is recommended even if you have had the Zostavax vaccine in the past. For more information, please look at the website below:  https://www.cdc.gov/vaccines/vpd/shingles/public/shingrix/index.html    Check on insurance coverage and cost for adacel Tdap(tetanus plus whooping cough vaccine) and get the immunization at your local pharmacy. (Once every 10 Years)

## 2021-09-17 NOTE — PROGRESS NOTES
The ABCs of the Annual Wellness Visit  Initial Medicare Wellness Visit    Chief Complaint:  Initial wellness exam    Subjective     History of Present Illness:  Andrea Diaz is a 66 y.o. male who presents for an Initial Medicare Wellness Visit.    The following portions of the patient's history were reviewed and   updated as appropriate: allergies, current medications, past family history, past medical history, past social history, past surgical history and problem list.      Compared to one year ago, the patient feels his physical health is the same.    Compared to one year ago, the patient feels his mental health is better.  A little better.    Recent Hospitalizations:  He was not admitted to the hospital during the last year.       Current Medical Providers:  Patient Care Team:  Velasquez Daniel MD as PCP - General (Family Medicine)    Outpatient Medications Prior to Visit   Medication Sig Dispense Refill   • amLODIPine (NORVASC) 10 MG tablet Take 1 tablet by mouth Daily. 90 tablet 1   • atorvastatin (LIPITOR) 10 MG tablet Take 1 tablet by mouth Daily. 90 tablet 1   • diclofenac (VOLTAREN) 75 MG EC tablet TAKE 1 TABLET BY MOUTH TWICE DAILY WITH FOOD 180 tablet 0   • famotidine (PEPCID) 20 MG tablet Take 1 tablet by mouth every night at bedtime. 90 tablet 1   • glucosamine sulfate 500 MG capsule capsule Take  by mouth 2 (Two) Times a Day With Meals.     • hydroCHLOROthiazide (HYDRODIURIL) 25 MG tablet Take 1 tablet by mouth Daily. 90 tablet 1   • Loratadine 10 MG capsule Take  by mouth.     • metFORMIN ER (GLUCOPHAGE-XR) 500 MG 24 hr tablet Take 2 tablets by mouth Daily. 180 tablet 1   • vitamin C (ASCORBIC ACID) 250 MG tablet Take 500 mg by mouth Daily.     • diclofenac (VOLTAREN) 75 MG EC tablet Take 75 mg by mouth 2 (Two) Times a Day. With food       No facility-administered medications prior to visit.       No opioid medication identified on active medication list. I have reviewed chart for other  potential  high risk medication/s and harmful drug interactions in the elderly.          Aspirin is not on active medication list.  Aspirin use is not indicated based on review of current medical condition/s. Risk of harm outweighs potential benefits.  .    Patient Active Problem List   Diagnosis   • Family history of colon cancer   • Screening for colon cancer   • Type 2 diabetes mellitus without complication (CMS/HCC)   • Pure hypercholesterolemia   • Primary generalized (osteo)arthritis   • Essential (primary) hypertension     Advance Care Planning   Advance Directive is not on file.  ACP discussion was held with the patient during this visit. Patient does not have an advance directive, information provided.  He says that his daughters would make decisions for him if needed.    HPI    Review of Systems:  Review of Systems   Constitutional: Negative for chills and fever.   Respiratory: Negative for cough and shortness of breath.    Cardiovascular: Negative for chest pain and palpitations.   Gastrointestinal: Negative for abdominal pain, nausea and vomiting.         Objective       Vitals:    09/17/21 1543 09/17/21 1557   BP: 157/78 138/71   Pulse: 96 96   Temp: 98.4 °F (36.9 °C)    SpO2: 96%    Weight: 129 kg (283 lb 12.8 oz)      BMI Readings from Last 1 Encounters:   09/17/21 43.77 kg/m²   BMI is above normal parameters. Recommendations include: exercise counseling and nutrition counseling    Does the patient have evidence of cognitive impairment? No    Physical Exam  Vitals and nursing note reviewed.   Constitutional:       General: He is not in acute distress.     Appearance: He is obese. He is not ill-appearing.   HENT:      Right Ear: Tympanic membrane and ear canal normal.      Left Ear: Tympanic membrane and ear canal normal.      Ears:      Comments: Hearing is slightly diminished in the left ear.  Seems to be normal on the right side.     Mouth/Throat:      Mouth: Mucous membranes are moist.       Comments: Pharynx appears normal  Eyes:      Extraocular Movements: Extraocular movements intact.      Pupils: Pupils are equal, round, and reactive to light.      Comments: Binocular vision is 20/25 with correction   Neck:      Thyroid: No thyromegaly.      Comments: No thyromegaly  Cardiovascular:      Rate and Rhythm: Normal rate and regular rhythm.      Pulses:           Dorsalis pedis pulses are 2+ on the right side and 2+ on the left side.      Heart sounds: No murmur heard.     Pulmonary:      Effort: Pulmonary effort is normal.      Breath sounds: Normal breath sounds.   Abdominal:      General: There is no distension.      Palpations: Abdomen is soft. There is no mass.      Tenderness: There is no abdominal tenderness.   Musculoskeletal:      Cervical back: Normal range of motion and neck supple.   Feet:      Right foot:      Protective Sensation: 3 sites tested. 3 sites sensed.      Skin integrity: Skin integrity normal.      Left foot:      Protective Sensation: 3 sites tested. 3 sites sensed.      Skin integrity: Skin integrity normal.   Lymphadenopathy:      Cervical: No cervical adenopathy.   Skin:     Findings: No lesion or rash.   Neurological:      General: No focal deficit present.      Mental Status: He is alert and oriented to person, place, and time.      Cranial Nerves: No cranial nerve deficit.   Psychiatric:         Mood and Affect: Mood normal.       The following data was reviewed by Velasquez Daniel MD on 09/17/2021.  Lab Results   Component Value Date    WBC 6.88 01/05/2021    HGB 15.00 01/05/2021    HCT 42.8 01/05/2021    MCV 86.5 01/05/2021    .00 01/05/2021     Lab Results   Component Value Date    GLUCOSE 142 (H) 07/30/2021    BUN 25 (H) 07/30/2021    CREATININE 1.04 07/30/2021    EGFRIFNONA 71 07/30/2021    BCR 24.0 07/30/2021    K 4.1 07/30/2021    CO2 24.1 07/30/2021    CALCIUM 9.7 07/30/2021    ALBUMIN 4.60 07/30/2021    LABIL2 1.2 (L) 01/05/2021    AST 15 07/30/2021     ALT 27 07/30/2021     Lab Results   Component Value Date    CHLPL 136 01/05/2021    TRIG 121 01/05/2021    HDL 37 (L) 01/05/2021    LDL 75 01/05/2021     Lab Results   Component Value Date    TSH 2.200 07/30/2021     Lab Results   Component Value Date    HGBA1C 6.70 (H) 07/30/2021     Lab Results   Component Value Date    PSA 0.487 07/30/2021    PSA 0.50 06/10/2020    PSA 0.43 02/02/2019          HEALTH RISK ASSESSMENT    Smoking Status:  Social History     Tobacco Use   Smoking Status Never Smoker   Smokeless Tobacco Never Used     Alcohol Consumption:  Social History     Substance and Sexual Activity   Alcohol Use Not Currently     Fall Risk Screen:    MIRNA Fall Risk Assessment was completed, and patient is at LOW risk for falls.Assessment completed on:9/17/2021    Depression Screen:   PHQ-2/PHQ-9 Depression Screening 7/30/2021   Little interest or pleasure in doing things 0   Feeling down, depressed, or hopeless 0   Total Score 0       Health Habits and Functional and Cognitive Screening:  Functional & Cognitive Status 9/17/2021   Do you have difficulty preparing food and eating? No   Do you have difficulty bathing yourself, getting dressed or grooming yourself? No   Do you have difficulty using the toilet? No   Do you have difficulty moving around from place to place? No   Do you have trouble with steps or getting out of a bed or a chair? No   Current Diet Well Balanced Diet   Dental Exam Up to date        Dental Exam Comment April 2021, goes q 6 MO   Eye Exam Up to date        Eye Exam Comment 9/13/21   Exercise (times per week) 7 times per week   Current Exercises Include Weightlifting;Walking;Yard Work;House Cleaning;Other        Exercise Comment machanic, lifts parts every day   Do you need help using the phone?  No   Are you deaf or do you have serious difficulty hearing?  No   Do you need help with transportation? No   Do you need help shopping? No   Do you need help preparing meals?  No   Do you need  help with housework?  No   Do you need help with laundry? No   Do you need help taking your medications? No   Do you need help managing money? No   Do you ever drive or ride in a car without wearing a seat belt? No   Have you felt unusual stress, anger or loneliness in the last month? Yes   Who do you live with? Alone   If you need help, do you have trouble finding someone available to you? No   Have you been bothered in the last four weeks by sexual problems? No   Do you have difficulty concentrating, remembering or making decisions? No       Age-appropriate Screening Schedule:  Refer to the list below for future screening recommendations based on patient's age, sex and/or medical conditions. Orders for these recommended tests are listed in the plan section. The patient has been provided with a written plan.    Health Maintenance   Topic Date Due   • TDAP/TD VACCINES (1 - Tdap) Never done   • ZOSTER VACCINE (3 of 3) 10/24/2018   • INFLUENZA VACCINE  10/01/2021   • LIPID PANEL  01/05/2022   • URINE MICROALBUMIN  01/05/2022   • HEMOGLOBIN A1C  01/30/2022   • DIABETIC EYE EXAM  09/14/2022   • DIABETIC FOOT EXAM  09/17/2022                   Assessment and Plan:       CMS Preventative Services Quick Reference  Risk Factors Identified During Encounter  Cardiovascular Disease  Depression/Dysphoria  Hearing Problem  Inactivity/Sedentary  Obesity/Overweight      The above risks/problems have been discussed with the patient.  Follow up actions/plans if indicated are seen below in the Assessment/Plan Section.  Pertinent information has been shared with the patient in the After Visit Summary.    Today, we have again reviewed his care.  He seems to be doing well overall.  I will recommend no changes at this time.  His most recent labs and other history have been reviewed.  He is not due for any follow-up testing presently.    Diagnoses and all orders for this visit:    1. Physical exam (Primary)        Follow Up:   Return in  about 6 months (around 3/17/2022).     An After Visit Summary and PPPS were given to the patient.

## 2021-10-05 RX ORDER — DICLOFENAC SODIUM 75 MG/1
TABLET, DELAYED RELEASE ORAL
Qty: 180 TABLET | Refills: 0 | Status: SHIPPED | OUTPATIENT
Start: 2021-10-05 | End: 2022-01-03

## 2022-01-03 RX ORDER — DICLOFENAC SODIUM 75 MG/1
TABLET, DELAYED RELEASE ORAL
Qty: 180 TABLET | Refills: 0 | Status: SHIPPED | OUTPATIENT
Start: 2022-01-03 | End: 2022-03-15 | Stop reason: SDUPTHER

## 2022-02-24 DIAGNOSIS — I10 ESSENTIAL (PRIMARY) HYPERTENSION: ICD-10-CM

## 2022-02-24 RX ORDER — AMLODIPINE BESYLATE 10 MG/1
10 TABLET ORAL DAILY
Qty: 90 TABLET | Refills: 0 | Status: SHIPPED | OUTPATIENT
Start: 2022-02-24 | End: 2022-03-15 | Stop reason: SDUPTHER

## 2022-02-24 NOTE — TELEPHONE ENCOUNTER
I did authorize a single refill.  Please schedule follow-up visit.  He has diabetes and other issues for which seeing him every 6 months is appropriate.  Thanks.  
Pt inf, appt scheduled.  
Yes

## 2022-03-04 DIAGNOSIS — I10 ESSENTIAL (PRIMARY) HYPERTENSION: ICD-10-CM

## 2022-03-04 RX ORDER — HYDROCHLOROTHIAZIDE 25 MG/1
25 TABLET ORAL DAILY
Qty: 90 TABLET | Refills: 1 | Status: SHIPPED | OUTPATIENT
Start: 2022-03-04 | End: 2022-03-15 | Stop reason: SDUPTHER

## 2022-03-15 ENCOUNTER — LAB (OUTPATIENT)
Dept: LAB | Facility: HOSPITAL | Age: 67
End: 2022-03-15

## 2022-03-15 ENCOUNTER — OFFICE VISIT (OUTPATIENT)
Dept: FAMILY MEDICINE CLINIC | Age: 67
End: 2022-03-15

## 2022-03-15 VITALS
DIASTOLIC BLOOD PRESSURE: 74 MMHG | BODY MASS INDEX: 43.19 KG/M2 | HEART RATE: 82 BPM | HEIGHT: 68 IN | TEMPERATURE: 98.3 F | SYSTOLIC BLOOD PRESSURE: 133 MMHG | WEIGHT: 285 LBS

## 2022-03-15 DIAGNOSIS — E66.01 MORBID OBESITY: ICD-10-CM

## 2022-03-15 DIAGNOSIS — E11.9 TYPE 2 DIABETES MELLITUS WITHOUT COMPLICATION, WITHOUT LONG-TERM CURRENT USE OF INSULIN: Primary | ICD-10-CM

## 2022-03-15 DIAGNOSIS — I10 ESSENTIAL HYPERTENSION: ICD-10-CM

## 2022-03-15 DIAGNOSIS — Z79.899 OTHER LONG TERM (CURRENT) DRUG THERAPY: ICD-10-CM

## 2022-03-15 DIAGNOSIS — E78.2 MIXED HYPERLIPIDEMIA: ICD-10-CM

## 2022-03-15 DIAGNOSIS — E11.9 TYPE 2 DIABETES MELLITUS WITHOUT COMPLICATION, WITHOUT LONG-TERM CURRENT USE OF INSULIN: ICD-10-CM

## 2022-03-15 LAB
ALBUMIN SERPL-MCNC: 4.5 G/DL (ref 3.5–5.2)
ALBUMIN UR-MCNC: <1.2 MG/DL
ALBUMIN/GLOB SERPL: 1.5 G/DL
ALP SERPL-CCNC: 79 U/L (ref 39–117)
ALT SERPL W P-5'-P-CCNC: 29 U/L (ref 1–41)
ANION GAP SERPL CALCULATED.3IONS-SCNC: 12 MMOL/L (ref 5–15)
AST SERPL-CCNC: 17 U/L (ref 1–40)
BILIRUB SERPL-MCNC: 0.7 MG/DL (ref 0–1.2)
BUN SERPL-MCNC: 20 MG/DL (ref 8–23)
BUN/CREAT SERPL: 20.2 (ref 7–25)
CALCIUM SPEC-SCNC: 9.7 MG/DL (ref 8.6–10.5)
CHLORIDE SERPL-SCNC: 101 MMOL/L (ref 98–107)
CHOLEST SERPL-MCNC: 126 MG/DL (ref 0–200)
CO2 SERPL-SCNC: 26 MMOL/L (ref 22–29)
CREAT SERPL-MCNC: 0.99 MG/DL (ref 0.76–1.27)
DEPRECATED RDW RBC AUTO: 38.7 FL (ref 37–54)
EGFRCR SERPLBLD CKD-EPI 2021: 83 ML/MIN/1.73
ERYTHROCYTE [DISTWIDTH] IN BLOOD BY AUTOMATED COUNT: 11.9 % (ref 12.3–15.4)
GLOBULIN UR ELPH-MCNC: 3.1 GM/DL
GLUCOSE SERPL-MCNC: 151 MG/DL (ref 65–99)
HBA1C MFR BLD: 7.1 % (ref 4.8–5.6)
HCT VFR BLD AUTO: 44 % (ref 37.5–51)
HDLC SERPL-MCNC: 36 MG/DL (ref 40–60)
HGB BLD-MCNC: 15.3 G/DL (ref 13–17.7)
LDLC SERPL CALC-MCNC: 74 MG/DL (ref 0–100)
LDLC/HDLC SERPL: 2.04 {RATIO}
MCH RBC QN AUTO: 30.5 PG (ref 26.6–33)
MCHC RBC AUTO-ENTMCNC: 34.8 G/DL (ref 31.5–35.7)
MCV RBC AUTO: 87.8 FL (ref 79–97)
PLATELET # BLD AUTO: 224 10*3/MM3 (ref 140–450)
PMV BLD AUTO: 10.6 FL (ref 6–12)
POTASSIUM SERPL-SCNC: 4.2 MMOL/L (ref 3.5–5.2)
PROT SERPL-MCNC: 7.6 G/DL (ref 6–8.5)
RBC # BLD AUTO: 5.01 10*6/MM3 (ref 4.14–5.8)
SODIUM SERPL-SCNC: 139 MMOL/L (ref 136–145)
TRIGL SERPL-MCNC: 83 MG/DL (ref 0–150)
TSH SERPL DL<=0.05 MIU/L-ACNC: 1.84 UIU/ML (ref 0.27–4.2)
VLDLC SERPL-MCNC: 16 MG/DL (ref 5–40)
WBC NRBC COR # BLD: 5.86 10*3/MM3 (ref 3.4–10.8)

## 2022-03-15 PROCEDURE — 85027 COMPLETE CBC AUTOMATED: CPT

## 2022-03-15 PROCEDURE — 82043 UR ALBUMIN QUANTITATIVE: CPT

## 2022-03-15 PROCEDURE — 99214 OFFICE O/P EST MOD 30 MIN: CPT | Performed by: FAMILY MEDICINE

## 2022-03-15 PROCEDURE — 84443 ASSAY THYROID STIM HORMONE: CPT

## 2022-03-15 PROCEDURE — 80061 LIPID PANEL: CPT

## 2022-03-15 PROCEDURE — 80053 COMPREHEN METABOLIC PANEL: CPT

## 2022-03-15 PROCEDURE — 83036 HEMOGLOBIN GLYCOSYLATED A1C: CPT

## 2022-03-15 PROCEDURE — 36415 COLL VENOUS BLD VENIPUNCTURE: CPT

## 2022-03-15 RX ORDER — METFORMIN HYDROCHLORIDE 500 MG/1
1000 TABLET, EXTENDED RELEASE ORAL DAILY
Qty: 180 TABLET | Refills: 1 | Status: SHIPPED | OUTPATIENT
Start: 2022-03-15 | End: 2022-07-05 | Stop reason: SDUPTHER

## 2022-03-15 RX ORDER — ATORVASTATIN CALCIUM 10 MG/1
10 TABLET, FILM COATED ORAL DAILY
Qty: 90 TABLET | Refills: 1 | Status: SHIPPED | OUTPATIENT
Start: 2022-03-15 | End: 2022-07-05 | Stop reason: SDUPTHER

## 2022-03-15 RX ORDER — AMLODIPINE BESYLATE 10 MG/1
10 TABLET ORAL DAILY
Qty: 90 TABLET | Refills: 1 | Status: SHIPPED | OUTPATIENT
Start: 2022-03-15 | End: 2022-10-31 | Stop reason: SDUPTHER

## 2022-03-15 RX ORDER — HYDROCHLOROTHIAZIDE 25 MG/1
25 TABLET ORAL DAILY
Qty: 90 TABLET | Refills: 1 | Status: SHIPPED | OUTPATIENT
Start: 2022-03-15 | End: 2022-08-31 | Stop reason: SDUPTHER

## 2022-03-15 RX ORDER — DICLOFENAC SODIUM 75 MG/1
75 TABLET, DELAYED RELEASE ORAL 2 TIMES DAILY WITH MEALS
Qty: 180 TABLET | Refills: 1 | Status: SHIPPED | OUTPATIENT
Start: 2022-03-15 | End: 2022-07-05 | Stop reason: SDUPTHER

## 2022-03-15 RX ORDER — FAMOTIDINE 20 MG/1
20 TABLET, FILM COATED ORAL
Qty: 90 TABLET | Refills: 1 | Status: SHIPPED | OUTPATIENT
Start: 2022-03-15 | End: 2022-11-14 | Stop reason: SDUPTHER

## 2022-03-15 NOTE — PROGRESS NOTES
Andrea Diaz presents to [facility].    Chief Complaint:  Diabetes, blood pressure, cholesterol    Subjective       History of Present Illness:  Marcelino is in today for follow-up on his care.  He has type 2 diabetes for which he remains on Metformin.  Time he does not check his blood sugar.  He is unaware of any low blood sugars.    Marcelino also has hypertension and remains on amlodipine with hydrochlorothiazide.  His pressure is fairly well controlled today.    He also has elevated cholesterol for which he remains on statin therapy.      Review of Systems:  Review of Systems   Constitutional: Negative for chills and fever.   Respiratory: Negative for cough and shortness of breath.    Cardiovascular: Negative for chest pain and palpitations.   Gastrointestinal: Positive for abdominal pain (epigastric pain periodically). Negative for nausea and vomiting.        Objective   Medical History:  Past Medical History:   • Essential (primary) hypertension   • Other long term (current) drug therapy   • Primary generalized (osteo)arthritis   • Pure hypercholesterolemia   • Type 2 diabetes mellitus without complication (HCC)   • Unilateral primary osteoarthritis, left knee     Past Surgical History:   • COLONOSCOPY    Procedure: COLONOSCOPY TO CECUM WITH COLD POLYPECTOMY;  Surgeon: Bridgette Jefferson MD;  Location: [facility] ENDOSCOPY;  Service:    • JOINT REPLACEMENT    knee   • KNEE ARTHROPLASTY   • KNEE SURGERY    meniscus      Family History   Problem Relation Age of Onset   • Coronary artery disease Mother    • Pancreatic cancer Father    • Diabetes type II Father    • Colon cancer Other      Social History     Tobacco Use   • Smoking status: Never Smoker   • Smokeless tobacco: Never Used   Substance Use Topics   • Alcohol use: Not Currently       Health Maintenance Due   Topic Date Due   • TDAP/TD VACCINES (1 - Tdap) Never done   • ZOSTER VACCINE (3 of 3) 10/24/2018   • LIPID PANEL   "07/23/2021   • URINE MICROALBUMIN  01/05/2022   • HEMOGLOBIN A1C  01/30/2022        Immunization History   Administered Date(s) Administered   • COVID-19 (PFIZER) PURPLE CAP 03/17/2021, 04/07/2021   • Hepatitis A 06/09/2018, 06/11/2018, 01/02/2019   • Influenza, Unspecified 01/05/2021   • Pneumococcal Conjugate 13-Valent (PCV13) 10/26/2018   • Pneumococcal Polysaccharide (PPSV23) 10/01/2019   • Shingrix 06/09/2018, 06/09/2018   • Zostavax 10/09/2013, 08/29/2018       No Known Allergies     Medications:  Current Outpatient Medications on File Prior to Visit   Medication Sig   • glucosamine sulfate 500 MG capsule capsule Take  by mouth 2 (Two) Times a Day With Meals.   • Loratadine 10 MG capsule Take  by mouth.   • vitamin C (ASCORBIC ACID) 250 MG tablet Take 500 mg by mouth Daily.   • [DISCONTINUED] amLODIPine (NORVASC) 10 MG tablet Take 1 tablet by mouth Daily.   • [DISCONTINUED] atorvastatin (LIPITOR) 10 MG tablet Take 1 tablet by mouth Daily.   • [DISCONTINUED] diclofenac (VOLTAREN) 75 MG EC tablet TAKE 1 TABLET BY MOUTH TWICE DAILY WITH FOOD   • [DISCONTINUED] famotidine (PEPCID) 20 MG tablet Take 1 tablet by mouth every night at bedtime.   • [DISCONTINUED] hydroCHLOROthiazide (HYDRODIURIL) 25 MG tablet TAKE 1 TABLET BY MOUTH DAILY   • [DISCONTINUED] metFORMIN ER (GLUCOPHAGE-XR) 500 MG 24 hr tablet Take 2 tablets by mouth Daily.     No current facility-administered medications on file prior to visit.       Vital Signs:   /74 (BP Location: Right arm, Patient Position: Sitting, Cuff Size: Large Adult)   Pulse 82   Temp 98.3 °F (36.8 °C) (Oral)   Ht 171.5 cm (67.52\")   Wt 129 kg (285 lb)   BMI 43.95 kg/m²       Physical Exam:  Physical Exam  Vitals reviewed.   Constitutional:       General: He is not in acute distress.     Appearance: He is obese. He is not ill-appearing.   Eyes:      Pupils: Pupils are equal, round, and reactive to light.   Neck:      Comments: No thyromegaly  Cardiovascular:      Rate " and Rhythm: Normal rate and regular rhythm.   Pulmonary:      Effort: Pulmonary effort is normal.      Breath sounds: Normal breath sounds.   Abdominal:      General: There is no distension.      Palpations: Abdomen is soft.      Tenderness: There is no abdominal tenderness.   Musculoskeletal:      Cervical back: Neck supple.   Lymphadenopathy:      Cervical: No cervical adenopathy.   Skin:     Findings: No lesion or rash.   Neurological:      Mental Status: He is alert.         Result Review      The following data was reviewed by Velasquez Daniel MD on 03/15/2022.  Lab Results   Component Value Date    WBC 6.88 01/05/2021    HGB 15.00 01/05/2021    HCT 42.8 01/05/2021    MCV 86.5 01/05/2021    .00 01/05/2021     Lab Results   Component Value Date    GLUCOSE 142 (H) 07/30/2021    BUN 25 (H) 07/30/2021    CREATININE 1.04 07/30/2021     (L) 07/30/2021    K 4.1 07/30/2021     07/30/2021    CO2 24.1 07/30/2021    CALCIUM 9.7 07/30/2021    PROTEINTOT 7.8 07/30/2021    ALBUMIN 4.60 07/30/2021    ALT 27 07/30/2021    AST 15 07/30/2021    ALKPHOS 70 07/30/2021    BILITOT 0.8 07/30/2021    EGFRIFNONA 71 07/30/2021    GLOB 3.2 07/30/2021    AGRATIO 1.4 07/30/2021    BCR 24.0 07/30/2021    ANIONGAP 10.9 07/30/2021      Lab Results   Component Value Date    CHLPL 136 01/05/2021    TRIG 121 01/05/2021    HDL 37 (L) 01/05/2021    LDL 75 01/05/2021     Lab Results   Component Value Date    TSH 2.200 07/30/2021     Lab Results   Component Value Date    HGBA1C 6.70 (H) 07/30/2021     Lab Results   Component Value Date    PSA 0.487 07/30/2021    PSA 0.50 06/10/2020    PSA 0.43 02/02/2019            Assessment and Plan:   Today, we have again reviewed his care.  We will refill needed medications and update labs as noted below.  No near-term change is anticipated.  We have again explained the rationale for treating these health problems.  Marcelino is not real happy about being on medications, but we are trying to  decrease his risk of heart attack, stroke, and potential complications from his health problems over time.  We will plan to see him back in about 6 months for wellness.    {CC Problem List  Visit Diagnosis  ROS  Review (Popup)  Health Maintenance  Quality  BestPractice  Medications  SmartSets  SnapShot Encounters  Media :23}   Diagnoses and all orders for this visit:    1. Type 2 diabetes mellitus without complication, without long-term current use of insulin (HCC) (Primary)  -     MicroAlbumin, Urine, Random - Urine, Clean Catch; Future  -     Hemoglobin A1c; Future  -     TSH; Future  -     metFORMIN ER (GLUCOPHAGE-XR) 500 MG 24 hr tablet; Take 2 tablets by mouth Daily.  Dispense: 180 tablet; Refill: 1    2. Essential hypertension  -     Comprehensive Metabolic Panel; Future  -     amLODIPine (NORVASC) 10 MG tablet; Take 1 tablet by mouth Daily.  Dispense: 90 tablet; Refill: 1  -     hydroCHLOROthiazide (HYDRODIURIL) 25 MG tablet; Take 1 tablet by mouth Daily.  Dispense: 90 tablet; Refill: 1    3. Mixed hyperlipidemia  -     Lipid Panel; Future  -     TSH; Future  -     atorvastatin (LIPITOR) 10 MG tablet; Take 1 tablet by mouth Daily.  Dispense: 90 tablet; Refill: 1    4. Morbid obesity (HCC)  Comments:  Continue efforts toward gradual weight loss.    5. Other long term (current) drug therapy  -     CBC (No Diff); Future    Other orders  -     diclofenac (VOLTAREN) 75 MG EC tablet; Take 1 tablet by mouth 2 (Two) Times a Day With Meals.  Dispense: 180 tablet; Refill: 1  -     famotidine (PEPCID) 20 MG tablet; Take 1 tablet by mouth every night at bedtime.  Dispense: 90 tablet; Refill: 1          Follow Up   Return in about 6 months (around 9/19/2022) for Recheck, Medicare Wellness.  Patient was given instructions and counseling regarding his condition or for health maintenance advice. Please see specific information pulled into the AVS if appropriate.

## 2022-04-03 DIAGNOSIS — E11.9 TYPE 2 DIABETES MELLITUS WITHOUT COMPLICATION, WITHOUT LONG-TERM CURRENT USE OF INSULIN: ICD-10-CM

## 2022-04-03 DIAGNOSIS — E78.2 MIXED HYPERLIPIDEMIA: ICD-10-CM

## 2022-04-04 RX ORDER — ATORVASTATIN CALCIUM 10 MG/1
10 TABLET, FILM COATED ORAL DAILY
Qty: 90 TABLET | Refills: 1 | OUTPATIENT
Start: 2022-04-04

## 2022-04-04 RX ORDER — DICLOFENAC SODIUM 75 MG/1
TABLET, DELAYED RELEASE ORAL
Qty: 180 TABLET | Refills: 1 | OUTPATIENT
Start: 2022-04-04

## 2022-04-04 RX ORDER — METFORMIN HYDROCHLORIDE 500 MG/1
1000 TABLET, EXTENDED RELEASE ORAL DAILY
Qty: 180 TABLET | Refills: 1 | OUTPATIENT
Start: 2022-04-04

## 2022-04-04 RX ORDER — FAMOTIDINE 20 MG/1
20 TABLET, FILM COATED ORAL
Qty: 90 TABLET | Refills: 1 | OUTPATIENT
Start: 2022-04-04

## 2022-04-11 ENCOUNTER — TELEPHONE (OUTPATIENT)
Dept: FAMILY MEDICINE CLINIC | Age: 67
End: 2022-04-11

## 2022-04-11 NOTE — TELEPHONE ENCOUNTER
Caller: Andrea Diaz    Relationship: Self    Best call back number: [phone]    What medication are you requesting: ANTIBIOTIC   What are your current symptoms: COUGH, CONGESTION RUNNY NOSE   How long have you been experiencing symptoms: 4 DAYS   Have you had these symptoms before:    [x] Yes  [] No    Have you been treated for these symptoms before:   [x] Yes  [] No    If a prescription is needed, what is your preferred pharmacy and phone number: [id number] - [facility] [address] AT [facility] [place] [address] & [place] [address] - [phone]  - [phone] FX

## 2022-04-11 NOTE — TELEPHONE ENCOUNTER
We do not do antibiotics and treatment for this kind of thing over the phone.  He would need to be worked in with someone or possibly go to urgent care.  Thanks.

## 2022-04-12 ENCOUNTER — OFFICE VISIT (OUTPATIENT)
Dept: FAMILY MEDICINE CLINIC | Age: 67
End: 2022-04-12

## 2022-04-12 VITALS
WEIGHT: 282.6 LBS | TEMPERATURE: 98.3 F | DIASTOLIC BLOOD PRESSURE: 81 MMHG | SYSTOLIC BLOOD PRESSURE: 131 MMHG | BODY MASS INDEX: 42.83 KG/M2 | HEIGHT: 68 IN | HEART RATE: 96 BPM

## 2022-04-12 DIAGNOSIS — J01.00 ACUTE NON-RECURRENT MAXILLARY SINUSITIS: Primary | ICD-10-CM

## 2022-04-12 PROCEDURE — 99213 OFFICE O/P EST LOW 20 MIN: CPT | Performed by: FAMILY MEDICINE

## 2022-04-12 RX ORDER — AMOXICILLIN AND CLAVULANATE POTASSIUM 875; 125 MG/1; MG/1
1 TABLET, FILM COATED ORAL 2 TIMES DAILY
Qty: 20 TABLET | Refills: 0 | Status: SHIPPED | OUTPATIENT
Start: 2022-04-12 | End: 2022-11-14

## 2022-04-12 NOTE — PROGRESS NOTES
Andrea Diaz presents to [facility].    Chief Complaint:  Congestion and coughing    Subjective   {Problem List  Visit Diagnosis   Encounters  Notes  Medications  Labs  Result Review Imaging  Media :23}     History of Present Illness:  Marcelino is in today for follow-up and evaluation on congestion and drainage.  He has been dealing with some cough and chest congestion for the last 4 days.  He denies any fever or chills.  He is unaware of any exposure to COVID-19.  He did have the Covid vaccination about a year ago.  He is having quite a bit of a postnasal drip.  He has some associated headache.      Review of Systems:  Review of Systems   Constitutional: Negative for chills and fever.   HENT: Positive for congestion. Negative for sore throat.    Respiratory: Positive for cough. Negative for shortness of breath.    Cardiovascular: Negative for chest pain and palpitations.   Gastrointestinal: Negative for abdominal pain, nausea and vomiting.        Objective   Medical History:  Past Medical History:   • Essential (primary) hypertension   • Other long term (current) drug therapy   • Primary generalized (osteo)arthritis   • Pure hypercholesterolemia   • Type 2 diabetes mellitus without complication (HCC)   • Unilateral primary osteoarthritis, left knee     Past Surgical History:   • COLONOSCOPY    Procedure: COLONOSCOPY TO CECUM WITH COLD POLYPECTOMY;  Surgeon: Bridgette Jefferson MD;  Location: [facility] ENDOSCOPY;  Service:    • JOINT REPLACEMENT    knee   • KNEE ARTHROPLASTY   • KNEE SURGERY    meniscus      Family History   Problem Relation Age of Onset   • Coronary artery disease Mother    • Pancreatic cancer Father    • Diabetes type II Father    • Colon cancer Other      Social History     Tobacco Use   • Smoking status: Never Smoker   • Smokeless tobacco: Never Used   Substance Use Topics   • Alcohol use: Not Currently       Health Maintenance Due   Topic Date Due   •  "TDAP/TD VACCINES (1 - Tdap) Never done   • ZOSTER VACCINE (3 of 3) 10/24/2018        Immunization History   Administered Date(s) Administered   • COVID-19 (PFIZER) PURPLE CAP 03/17/2021, 04/07/2021   • Hepatitis A 06/09/2018, 06/11/2018, 01/02/2019   • Influenza, Unspecified 01/05/2021   • Pneumococcal Conjugate 13-Valent (PCV13) 10/26/2018   • Pneumococcal Polysaccharide (PPSV23) 10/01/2019   • Shingrix 06/09/2018, 06/09/2018   • Zostavax 10/09/2013, 08/29/2018       No Known Allergies     Medications:  Current Outpatient Medications on File Prior to Visit   Medication Sig   • amLODIPine (NORVASC) 10 MG tablet Take 1 tablet by mouth Daily.   • atorvastatin (LIPITOR) 10 MG tablet Take 1 tablet by mouth Daily.   • diclofenac (VOLTAREN) 75 MG EC tablet Take 1 tablet by mouth 2 (Two) Times a Day With Meals.   • famotidine (PEPCID) 20 MG tablet Take 1 tablet by mouth every night at bedtime.   • glucosamine sulfate 500 MG capsule capsule Take  by mouth 2 (Two) Times a Day With Meals.   • hydroCHLOROthiazide (HYDRODIURIL) 25 MG tablet Take 1 tablet by mouth Daily.   • Loratadine 10 MG capsule Take  by mouth.   • metFORMIN ER (GLUCOPHAGE-XR) 500 MG 24 hr tablet Take 2 tablets by mouth Daily.   • vitamin C (ASCORBIC ACID) 250 MG tablet Take 500 mg by mouth Daily.     No current facility-administered medications on file prior to visit.       Vital Signs:   /81 (BP Location: Left arm, Patient Position: Sitting, Cuff Size: Large Adult)   Pulse 96   Temp 98.3 °F (36.8 °C) (Oral)   Ht 171.5 cm (67.52\")   Wt 128 kg (282 lb 9.6 oz)   BMI 43.58 kg/m²       Physical Exam:  Physical Exam  Vitals and nursing note reviewed.   Constitutional:       General: He is not in acute distress.     Appearance: He is obese. He is not ill-appearing.   HENT:      Right Ear: Tympanic membrane and ear canal normal.      Left Ear: Tympanic membrane and ear canal normal.      Nose:      Comments: Mild maxillary sinus tenderness is noted.     " Mouth/Throat:      Mouth: Mucous membranes are moist.      Comments: Pharynx appears normal  Eyes:      Extraocular Movements: Extraocular movements intact.      Pupils: Pupils are equal, round, and reactive to light.   Neck:      Thyroid: No thyromegaly.   Cardiovascular:      Rate and Rhythm: Normal rate and regular rhythm.      Heart sounds: No murmur heard.  Pulmonary:      Effort: Pulmonary effort is normal.      Breath sounds: Normal breath sounds.   Abdominal:      General: There is no distension.      Palpations: Abdomen is soft. There is no mass.      Tenderness: There is no abdominal tenderness.   Musculoskeletal:      Cervical back: Normal range of motion.   Skin:     Findings: No lesion or rash.   Neurological:      General: No focal deficit present.      Mental Status: He is oriented to person, place, and time.      Cranial Nerves: No cranial nerve deficit.   Psychiatric:         Mood and Affect: Mood normal.         Result Review      The following data was reviewed by Velasquez Daniel MD on 04/12/2022.  Lab Results   Component Value Date    WBC 5.86 03/15/2022    HGB 15.3 03/15/2022    HCT 44.0 03/15/2022    MCV 87.8 03/15/2022     03/15/2022     Lab Results   Component Value Date    GLUCOSE 151 (H) 03/15/2022    BUN 20 03/15/2022    CREATININE 0.99 03/15/2022     03/15/2022    K 4.2 03/15/2022     03/15/2022    CO2 26.0 03/15/2022    CALCIUM 9.7 03/15/2022    PROTEINTOT 7.6 03/15/2022    ALBUMIN 4.50 03/15/2022    ALT 29 03/15/2022    AST 17 03/15/2022    ALKPHOS 79 03/15/2022    BILITOT 0.7 03/15/2022    EGFRIFNONA 71 07/30/2021    GLOB 3.1 03/15/2022    AGRATIO 1.5 03/15/2022    BCR 20.2 03/15/2022    ANIONGAP 12.0 03/15/2022      Lab Results   Component Value Date    CHOL 126 03/15/2022    CHLPL 136 01/05/2021    TRIG 83 03/15/2022    HDL 36 (L) 03/15/2022    LDL 74 03/15/2022     Lab Results   Component Value Date    TSH 1.840 03/15/2022     Lab Results   Component Value  Date    HGBA1C 7.10 (H) 03/15/2022     Lab Results   Component Value Date    PSA 0.487 07/30/2021    PSA 0.50 06/10/2020    PSA 0.43 02/02/2019            Assessment and Plan:   Today, we have reviewed his care.  Because of his ongoing symptoms, we will cover him for a possible sinus infection as noted below.  We discussed COVID-19 testing, but he declines this today.  No other short-term changes anticipated.       Diagnoses and all orders for this visit:    1. Acute non-recurrent maxillary sinusitis (Primary)  -     amoxicillin-clavulanate (Augmentin) 875-125 MG per tablet; Take 1 tablet by mouth 2 (Two) Times a Day.  Dispense: 20 tablet; Refill: 0          Follow Up   Return in about 5 months (around 9/22/2022) for As scheduled.  Patient was given instructions and counseling regarding his condition or for health maintenance advice. Please see specific information pulled into the AVS if appropriate.

## 2022-06-09 ENCOUNTER — TELEPHONE (OUTPATIENT)
Dept: FAMILY MEDICINE CLINIC | Age: 67
End: 2022-06-09

## 2022-06-09 ENCOUNTER — HOSPITAL ENCOUNTER (OUTPATIENT)
Dept: GENERAL RADIOLOGY | Facility: HOSPITAL | Age: 67
Discharge: HOME | End: 2022-06-09
Admitting: FAMILY MEDICINE

## 2022-06-09 DIAGNOSIS — R07.81 RIB PAIN ON LEFT SIDE: Primary | ICD-10-CM

## 2022-06-09 DIAGNOSIS — R07.81 RIB PAIN ON LEFT SIDE: ICD-10-CM

## 2022-06-09 PROCEDURE — 71101 X-RAY EXAM UNILAT RIBS/CHEST: CPT

## 2022-06-09 NOTE — TELEPHONE ENCOUNTER
Caller: Andrea Diaz    Relationship: Self    Best call back number: [phone]     What orders are you requesting (i.e. lab or imaging): IMAGING     In what timeframe would the patient need to come in: ASAP     Where will you receive your lab/imaging services:    Additional notes: PATIENT STATED THAT A CHIVO FROM A TRACTOR HIT HIM IN THE LEFT RIB AND IS NOW IN PAIN AND WOULD LIKE TO GET IT X-RAYED PLEASE ADVISE PATIENT IF ORDER CAN BE PUT IN

## 2022-06-09 NOTE — TELEPHONE ENCOUNTER
I have placed an order for x-rays of the left ribs.  If he is having severe pain or shortness of breath, I would recommend he go to the emergency department as a precaution.  Thanks.

## 2022-06-16 ENCOUNTER — TELEPHONE (OUTPATIENT)
Dept: FAMILY MEDICINE CLINIC | Age: 67
End: 2022-06-16

## 2022-06-16 NOTE — TELEPHONE ENCOUNTER
----- Message from Meghan French LPN sent at 3/16/2022  9:42 AM EDT -----  TICKLE for in-house fingerstick A1c 90 days

## 2022-06-22 ENCOUNTER — CLINICAL SUPPORT (OUTPATIENT)
Dept: FAMILY MEDICINE CLINIC | Age: 67
End: 2022-06-22

## 2022-06-22 DIAGNOSIS — E11.9 TYPE 2 DIABETES MELLITUS WITHOUT COMPLICATION, WITHOUT LONG-TERM CURRENT USE OF INSULIN: Primary | ICD-10-CM

## 2022-06-22 LAB
EXPIRATION DATE: NORMAL
HBA1C MFR BLD: 6.6 %
Lab: NORMAL

## 2022-06-22 PROCEDURE — 3044F HG A1C LEVEL LT 7.0%: CPT | Performed by: FAMILY MEDICINE

## 2022-06-22 PROCEDURE — 83036 HEMOGLOBIN GLYCOSYLATED A1C: CPT | Performed by: FAMILY MEDICINE

## 2022-07-05 DIAGNOSIS — E11.9 TYPE 2 DIABETES MELLITUS WITHOUT COMPLICATION, WITHOUT LONG-TERM CURRENT USE OF INSULIN: ICD-10-CM

## 2022-07-05 DIAGNOSIS — E78.2 MIXED HYPERLIPIDEMIA: ICD-10-CM

## 2022-07-05 RX ORDER — ATORVASTATIN CALCIUM 10 MG/1
10 TABLET, FILM COATED ORAL DAILY
Qty: 90 TABLET | Refills: 1 | Status: SHIPPED | OUTPATIENT
Start: 2022-07-05 | End: 2022-11-14 | Stop reason: SDUPTHER

## 2022-07-05 RX ORDER — DICLOFENAC SODIUM 75 MG/1
75 TABLET, DELAYED RELEASE ORAL 2 TIMES DAILY WITH MEALS
Qty: 180 TABLET | Refills: 1 | Status: SHIPPED | OUTPATIENT
Start: 2022-07-05 | End: 2022-11-14 | Stop reason: SDUPTHER

## 2022-07-05 RX ORDER — METFORMIN HYDROCHLORIDE 500 MG/1
1000 TABLET, EXTENDED RELEASE ORAL DAILY
Qty: 180 TABLET | Refills: 1 | Status: SHIPPED | OUTPATIENT
Start: 2022-07-05 | End: 2022-11-14 | Stop reason: SDUPTHER

## 2022-08-31 DIAGNOSIS — I10 ESSENTIAL HYPERTENSION: ICD-10-CM

## 2022-09-01 RX ORDER — HYDROCHLOROTHIAZIDE 25 MG/1
25 TABLET ORAL DAILY
Qty: 90 TABLET | Refills: 0 | Status: SHIPPED | OUTPATIENT
Start: 2022-09-01 | End: 2022-11-14 | Stop reason: SDUPTHER

## 2022-10-31 DIAGNOSIS — I10 ESSENTIAL HYPERTENSION: ICD-10-CM

## 2022-11-01 RX ORDER — AMLODIPINE BESYLATE 10 MG/1
10 TABLET ORAL DAILY
Qty: 90 TABLET | Refills: 0 | Status: SHIPPED | OUTPATIENT
Start: 2022-11-01 | End: 2022-11-14 | Stop reason: SDUPTHER

## 2022-11-01 NOTE — TELEPHONE ENCOUNTER
I did send a single refill on this, but it looks like there was an appointment missed in September.  I would recommend he schedule follow-up.  Thanks.

## 2022-11-14 ENCOUNTER — OFFICE VISIT (OUTPATIENT)
Dept: FAMILY MEDICINE CLINIC | Age: 67
End: 2022-11-14

## 2022-11-14 ENCOUNTER — LAB (OUTPATIENT)
Dept: LAB | Facility: HOSPITAL | Age: 67
End: 2022-11-14

## 2022-11-14 VITALS
TEMPERATURE: 98.1 F | HEIGHT: 68 IN | SYSTOLIC BLOOD PRESSURE: 134 MMHG | BODY MASS INDEX: 42.47 KG/M2 | WEIGHT: 280.2 LBS | DIASTOLIC BLOOD PRESSURE: 82 MMHG | HEART RATE: 75 BPM

## 2022-11-14 DIAGNOSIS — E11.9 TYPE 2 DIABETES MELLITUS WITHOUT COMPLICATION, WITHOUT LONG-TERM CURRENT USE OF INSULIN: Primary | ICD-10-CM

## 2022-11-14 DIAGNOSIS — I10 ESSENTIAL HYPERTENSION: ICD-10-CM

## 2022-11-14 DIAGNOSIS — Z12.5 SCREENING FOR PROSTATE CANCER: ICD-10-CM

## 2022-11-14 DIAGNOSIS — M15.0 PRIMARY GENERALIZED (OSTEO)ARTHRITIS: ICD-10-CM

## 2022-11-14 DIAGNOSIS — E11.9 TYPE 2 DIABETES MELLITUS WITHOUT COMPLICATION, WITHOUT LONG-TERM CURRENT USE OF INSULIN: ICD-10-CM

## 2022-11-14 DIAGNOSIS — Z79.899 OTHER LONG TERM (CURRENT) DRUG THERAPY: ICD-10-CM

## 2022-11-14 DIAGNOSIS — E78.2 MIXED HYPERLIPIDEMIA: ICD-10-CM

## 2022-11-14 LAB
DEPRECATED RDW RBC AUTO: 39 FL (ref 37–54)
ERYTHROCYTE [DISTWIDTH] IN BLOOD BY AUTOMATED COUNT: 11.9 % (ref 12.3–15.4)
HCT VFR BLD AUTO: 45.1 % (ref 37.5–51)
HGB BLD-MCNC: 15.6 G/DL (ref 13–17.7)
MCH RBC QN AUTO: 30.6 PG (ref 26.6–33)
MCHC RBC AUTO-ENTMCNC: 34.6 G/DL (ref 31.5–35.7)
MCV RBC AUTO: 88.4 FL (ref 79–97)
PLATELET # BLD AUTO: 247 10*3/MM3 (ref 140–450)
PMV BLD AUTO: 10.6 FL (ref 6–12)
RBC # BLD AUTO: 5.1 10*6/MM3 (ref 4.14–5.8)
WBC NRBC COR # BLD: 7.57 10*3/MM3 (ref 3.4–10.8)

## 2022-11-14 PROCEDURE — G0103 PSA SCREENING: HCPCS

## 2022-11-14 PROCEDURE — 85027 COMPLETE CBC AUTOMATED: CPT

## 2022-11-14 PROCEDURE — 83036 HEMOGLOBIN GLYCOSYLATED A1C: CPT

## 2022-11-14 PROCEDURE — 36415 COLL VENOUS BLD VENIPUNCTURE: CPT

## 2022-11-14 PROCEDURE — 80053 COMPREHEN METABOLIC PANEL: CPT

## 2022-11-14 PROCEDURE — 99214 OFFICE O/P EST MOD 30 MIN: CPT | Performed by: FAMILY MEDICINE

## 2022-11-14 RX ORDER — METFORMIN HYDROCHLORIDE 500 MG/1
1000 TABLET, EXTENDED RELEASE ORAL DAILY
Qty: 180 TABLET | Refills: 1 | Status: SHIPPED | OUTPATIENT
Start: 2022-11-14

## 2022-11-14 RX ORDER — FAMOTIDINE 20 MG/1
20 TABLET, FILM COATED ORAL
Qty: 90 TABLET | Refills: 1 | Status: SHIPPED | OUTPATIENT
Start: 2022-11-14 | End: 2023-03-29 | Stop reason: SDUPTHER

## 2022-11-14 RX ORDER — DICLOFENAC SODIUM 75 MG/1
75 TABLET, DELAYED RELEASE ORAL 2 TIMES DAILY WITH MEALS
Qty: 180 TABLET | Refills: 1 | Status: SHIPPED | OUTPATIENT
Start: 2022-11-14

## 2022-11-14 RX ORDER — HYDROCHLOROTHIAZIDE 25 MG/1
25 TABLET ORAL DAILY
Qty: 90 TABLET | Refills: 1 | Status: SHIPPED | OUTPATIENT
Start: 2022-11-14

## 2022-11-14 RX ORDER — AMLODIPINE BESYLATE 10 MG/1
10 TABLET ORAL DAILY
Qty: 90 TABLET | Refills: 1 | Status: SHIPPED | OUTPATIENT
Start: 2022-11-14

## 2022-11-14 RX ORDER — ATORVASTATIN CALCIUM 10 MG/1
10 TABLET, FILM COATED ORAL DAILY
Qty: 90 TABLET | Refills: 1 | Status: SHIPPED | OUTPATIENT
Start: 2022-11-14

## 2022-11-14 NOTE — PROGRESS NOTES
Andrea Diaz presents to [facility].    Chief Complaint:  Blood pressure, cholesterol, diabetes    Subjective       History of Present Illness:  Marcelino is in today for follow-up on his care.  He has been diagnosed with type 2 diabetes for which he currently takes metformin.  He does not check his blood sugars.  He is due for recheck on this.    He also has hypertension and elevated cholesterol for which he remains on treatments as noted below.  His blood pressure is reasonable today.  His cholesterol was fairly well controlled when checked earlier this year.    He also has arthritis for which he remains on diclofenac.  It is of significant benefit to him.  Risks are again discussed.    Review of Systems:  Review of Systems   Constitutional: Negative for chills and fever.   Respiratory: Negative for cough and shortness of breath.    Cardiovascular: Negative for chest pain and palpitations.   Gastrointestinal: Negative for abdominal pain, nausea and vomiting.        Objective   Medical History:  Past Medical History:   • Essential (primary) hypertension   • Other long term (current) drug therapy   • Primary generalized (osteo)arthritis   • Pure hypercholesterolemia   • Type 2 diabetes mellitus without complication (HCC)   • Unilateral primary osteoarthritis, left knee     Past Surgical History:   • COLONOSCOPY    Procedure: COLONOSCOPY TO CECUM WITH COLD POLYPECTOMY;  Surgeon: Bridgette Jefferson MD;  Location: [facility] ENDOSCOPY;  Service:    • JOINT REPLACEMENT    knee   • KNEE ARTHROPLASTY   • KNEE SURGERY    meniscus      Family History   Problem Relation Age of Onset   • Coronary artery disease Mother    • Pancreatic cancer Father    • Diabetes type II Father    • Colon cancer Other      Social History     Tobacco Use   • Smoking status: Never   • Smokeless tobacco: Never   Substance Use Topics   • Alcohol use: Not Currently       Health Maintenance Due   Topic Date Due   •  "TDAP/TD VACCINES (1 - Tdap) Never done   • ZOSTER VACCINE (3 of 3) 10/24/2018   • DIABETIC EYE EXAM  09/14/2022   • ANNUAL WELLNESS VISIT  09/17/2022        Immunization History   Administered Date(s) Administered   • COVID-19 (PFIZER) PURPLE CAP 03/17/2021, 04/07/2021   • Hepatitis A 06/09/2018, 06/11/2018, 01/02/2019   • Influenza, Unspecified 01/05/2021   • Pneumococcal Conjugate 13-Valent (PCV13) 10/26/2018   • Pneumococcal Polysaccharide (PPSV23) 10/01/2019   • Shingrix 06/09/2018, 06/09/2018   • Zostavax 10/09/2013, 08/29/2018       No Known Allergies     Medications:  Current Outpatient Medications on File Prior to Visit   Medication Sig   • glucosamine sulfate 500 MG capsule capsule Take  by mouth 2 (Two) Times a Day With Meals.   • [DISCONTINUED] amLODIPine (NORVASC) 10 MG tablet Take 1 tablet by mouth Daily.   • [DISCONTINUED] atorvastatin (LIPITOR) 10 MG tablet Take 1 tablet by mouth Daily.   • [DISCONTINUED] diclofenac (VOLTAREN) 75 MG EC tablet Take 1 tablet by mouth 2 (Two) Times a Day With Meals.   • [DISCONTINUED] famotidine (PEPCID) 20 MG tablet Take 1 tablet by mouth every night at bedtime.   • [DISCONTINUED] hydroCHLOROthiazide (HYDRODIURIL) 25 MG tablet Take 1 tablet by mouth Daily.   • [DISCONTINUED] metFORMIN ER (GLUCOPHAGE-XR) 500 MG 24 hr tablet Take 2 tablets by mouth Daily.   • [DISCONTINUED] amoxicillin-clavulanate (Augmentin) 875-125 MG per tablet Take 1 tablet by mouth 2 (Two) Times a Day.   • [DISCONTINUED] Loratadine 10 MG capsule Take  by mouth.   • [DISCONTINUED] vitamin C (ASCORBIC ACID) 250 MG tablet Take 500 mg by mouth Daily.     No current facility-administered medications on file prior to visit.       Vital Signs:   /78   Pulse 77   Temp 98.1 °F (36.7 °C) (Oral)   Ht 171.5 cm (67.5\")   Wt 127 kg (280 lb 3.2 oz)   BMI 43.24 kg/m²       Physical Exam:  Physical Exam  Vitals reviewed.   Constitutional:       General: He is not in acute distress.     Appearance: He is " obese. He is not ill-appearing.   Eyes:      Pupils: Pupils are equal, round, and reactive to light.   Neck:      Comments: No thyromegaly  Cardiovascular:      Rate and Rhythm: Normal rate and regular rhythm.   Pulmonary:      Effort: Pulmonary effort is normal.      Breath sounds: Normal breath sounds.   Abdominal:      General: There is no distension.      Palpations: Abdomen is soft.      Tenderness: There is no abdominal tenderness.   Musculoskeletal:      Cervical back: Neck supple.   Lymphadenopathy:      Cervical: No cervical adenopathy.   Skin:     Findings: No lesion or rash.   Neurological:      Mental Status: He is alert.         Result Review      The following data was reviewed by Velasquez Daniel MD on 11/14/2022.  Lab Results   Component Value Date    WBC 5.86 03/15/2022    HGB 15.3 03/15/2022    HCT 44.0 03/15/2022    MCV 87.8 03/15/2022     03/15/2022     Lab Results   Component Value Date    GLUCOSE 151 (H) 03/15/2022    BUN 20 03/15/2022    CREATININE 0.99 03/15/2022     03/15/2022    K 4.2 03/15/2022     03/15/2022    CO2 26.0 03/15/2022    CALCIUM 9.7 03/15/2022    PROTEINTOT 7.6 03/15/2022    ALBUMIN 4.50 03/15/2022    ALT 29 03/15/2022    AST 17 03/15/2022    ALKPHOS 79 03/15/2022    BILITOT 0.7 03/15/2022    EGFRIFNONA 71 07/30/2021    GLOB 3.1 03/15/2022    AGRATIO 1.5 03/15/2022    BCR 20.2 03/15/2022    ANIONGAP 12.0 03/15/2022      Lab Results   Component Value Date    CHOL 126 03/15/2022    CHLPL 136 01/05/2021    TRIG 83 03/15/2022    HDL 36 (L) 03/15/2022    LDL 74 03/15/2022     Lab Results   Component Value Date    TSH 1.840 03/15/2022     Lab Results   Component Value Date    HGBA1C 6.6 06/22/2022     Lab Results   Component Value Date    PSA 0.487 07/30/2021    PSA 0.50 06/10/2020    PSA 0.43 02/02/2019            Assessment and Plan:   Today, we have reviewed his care.  Overall, Marcelino seems well.  We will update labs and refill needed medications.  I  anticipate seeing him again in about 6 months.  He declines flu shot, but he is considering stopping back by later this week forward.  He also declines COVID-19 booster.       Diagnoses and all orders for this visit:    1. Type 2 diabetes mellitus without complication, without long-term current use of insulin (HCC) (Primary)  -     Hemoglobin A1c; Future  -     metFORMIN ER (GLUCOPHAGE-XR) 500 MG 24 hr tablet; Take 2 tablets by mouth Daily.  Dispense: 180 tablet; Refill: 1    2. Essential hypertension  -     Comprehensive Metabolic Panel; Future  -     amLODIPine (NORVASC) 10 MG tablet; Take 1 tablet by mouth Daily.  Dispense: 90 tablet; Refill: 1  -     hydroCHLOROthiazide (HYDRODIURIL) 25 MG tablet; Take 1 tablet by mouth Daily.  Dispense: 90 tablet; Refill: 1    3. Mixed hyperlipidemia  -     Comprehensive Metabolic Panel; Future  -     atorvastatin (LIPITOR) 10 MG tablet; Take 1 tablet by mouth Daily.  Dispense: 90 tablet; Refill: 1    4. Primary generalized (osteo)arthritis  -     CBC (No Diff); Future  -     diclofenac (VOLTAREN) 75 MG EC tablet; Take 1 tablet by mouth 2 (Two) Times a Day With Meals.  Dispense: 180 tablet; Refill: 1    5. Screening for prostate cancer  -     PSA Screen; Future    6. Other long term (current) drug therapy  -     CBC (No Diff); Future    Other orders  -     famotidine (PEPCID) 20 MG tablet; Take 1 tablet by mouth every night at bedtime.  Dispense: 90 tablet; Refill: 1          Follow Up   Return in about 6 months (around 5/14/2023) for Recheck, Medicare Wellness.  Patient was given instructions and counseling regarding his condition or for health maintenance advice. Please see specific information pulled into the AVS if appropriate.

## 2022-11-15 LAB
ALBUMIN SERPL-MCNC: 4.5 G/DL (ref 3.5–5.2)
ALBUMIN/GLOB SERPL: 1.5 G/DL
ALP SERPL-CCNC: 79 U/L (ref 39–117)
ALT SERPL W P-5'-P-CCNC: 33 U/L (ref 1–41)
ANION GAP SERPL CALCULATED.3IONS-SCNC: 12.6 MMOL/L (ref 5–15)
AST SERPL-CCNC: 18 U/L (ref 1–40)
BILIRUB SERPL-MCNC: 0.5 MG/DL (ref 0–1.2)
BUN SERPL-MCNC: 16 MG/DL (ref 8–23)
BUN/CREAT SERPL: 15.4 (ref 7–25)
CALCIUM SPEC-SCNC: 10.1 MG/DL (ref 8.6–10.5)
CHLORIDE SERPL-SCNC: 99 MMOL/L (ref 98–107)
CO2 SERPL-SCNC: 26.4 MMOL/L (ref 22–29)
CREAT SERPL-MCNC: 1.04 MG/DL (ref 0.76–1.27)
EGFRCR SERPLBLD CKD-EPI 2021: 77.7 ML/MIN/1.73
GLOBULIN UR ELPH-MCNC: 3.1 GM/DL
GLUCOSE SERPL-MCNC: 122 MG/DL (ref 65–99)
HBA1C MFR BLD: 6.8 % (ref 4.8–5.6)
POTASSIUM SERPL-SCNC: 4.5 MMOL/L (ref 3.5–5.2)
PROT SERPL-MCNC: 7.6 G/DL (ref 6–8.5)
PSA SERPL-MCNC: 0.61 NG/ML (ref 0–4)
SODIUM SERPL-SCNC: 138 MMOL/L (ref 136–145)

## 2022-11-16 ENCOUNTER — TELEPHONE (OUTPATIENT)
Dept: FAMILY MEDICINE CLINIC | Age: 67
End: 2022-11-16

## 2022-11-16 DIAGNOSIS — Z12.11 ENCOUNTER FOR SCREENING COLONOSCOPY: Primary | ICD-10-CM

## 2022-11-16 DIAGNOSIS — Z80.0 FAMILY HISTORY OF COLON CANCER: ICD-10-CM

## 2022-11-16 NOTE — PROGRESS NOTES
Please let Marcelino know that I have reviewed his chart further.  His most recent colonoscopy was about 5 years ago.  Because of presumed family history of colon cancer, I would recommend referral back to general surgery for evaluation if he is agreeable.  His most recent scope was done by Bridgette Jefferson MD with [facility] in [place].  It would be okay with me to refer her back to her or to Dr. Albarran if he would prefer to have done in [place].  Please place order as per his preference.  Thanks.

## 2023-03-29 RX ORDER — FAMOTIDINE 20 MG/1
20 TABLET, FILM COATED ORAL
Qty: 90 TABLET | Refills: 1 | Status: SHIPPED | OUTPATIENT
Start: 2023-03-29

## 2023-05-16 ENCOUNTER — OFFICE VISIT (OUTPATIENT)
Dept: FAMILY MEDICINE CLINIC | Age: 68
End: 2023-05-16
Payer: MEDICARE

## 2023-05-16 ENCOUNTER — LAB (OUTPATIENT)
Dept: LAB | Facility: HOSPITAL | Age: 68
End: 2023-05-16
Payer: MEDICARE

## 2023-05-16 VITALS
BODY MASS INDEX: 42.22 KG/M2 | TEMPERATURE: 98 F | HEART RATE: 77 BPM | HEIGHT: 68 IN | SYSTOLIC BLOOD PRESSURE: 135 MMHG | DIASTOLIC BLOOD PRESSURE: 69 MMHG | WEIGHT: 278.6 LBS

## 2023-05-16 DIAGNOSIS — Z00.00 PHYSICAL EXAM: Primary | ICD-10-CM

## 2023-05-16 DIAGNOSIS — E78.2 MIXED HYPERLIPIDEMIA: ICD-10-CM

## 2023-05-16 DIAGNOSIS — E11.9 TYPE 2 DIABETES MELLITUS WITHOUT COMPLICATION, WITHOUT LONG-TERM CURRENT USE OF INSULIN: ICD-10-CM

## 2023-05-16 DIAGNOSIS — E66.01 MORBID OBESITY: ICD-10-CM

## 2023-05-16 DIAGNOSIS — I10 ESSENTIAL HYPERTENSION: ICD-10-CM

## 2023-05-16 DIAGNOSIS — L60.3 ONYCHODYSTROPHY: ICD-10-CM

## 2023-05-16 DIAGNOSIS — M15.0 PRIMARY GENERALIZED (OSTEO)ARTHRITIS: ICD-10-CM

## 2023-05-16 LAB
ALBUMIN SERPL-MCNC: 4.3 G/DL (ref 3.5–5.2)
ALBUMIN UR-MCNC: <1.2 MG/DL
ALBUMIN/GLOB SERPL: 1.6 G/DL
ALP SERPL-CCNC: 75 U/L (ref 39–117)
ALT SERPL W P-5'-P-CCNC: 39 U/L (ref 1–41)
ANION GAP SERPL CALCULATED.3IONS-SCNC: 9.9 MMOL/L (ref 5–15)
AST SERPL-CCNC: 20 U/L (ref 1–40)
BILIRUB SERPL-MCNC: 0.4 MG/DL (ref 0–1.2)
BUN SERPL-MCNC: 23 MG/DL (ref 8–23)
BUN/CREAT SERPL: 19.2 (ref 7–25)
CALCIUM SPEC-SCNC: 9.3 MG/DL (ref 8.6–10.5)
CHLORIDE SERPL-SCNC: 103 MMOL/L (ref 98–107)
CHOLEST SERPL-MCNC: 127 MG/DL (ref 0–200)
CO2 SERPL-SCNC: 25.1 MMOL/L (ref 22–29)
CREAT SERPL-MCNC: 1.2 MG/DL (ref 0.76–1.27)
EGFRCR SERPLBLD CKD-EPI 2021: 65.5 ML/MIN/1.73
GLOBULIN UR ELPH-MCNC: 2.7 GM/DL
GLUCOSE SERPL-MCNC: 140 MG/DL (ref 65–99)
HBA1C MFR BLD: 7 % (ref 4.8–5.6)
HDLC SERPL-MCNC: 34 MG/DL (ref 40–60)
LDLC SERPL CALC-MCNC: 73 MG/DL (ref 0–100)
LDLC/HDLC SERPL: 2.09 {RATIO}
POTASSIUM SERPL-SCNC: 4.1 MMOL/L (ref 3.5–5.2)
PROT SERPL-MCNC: 7 G/DL (ref 6–8.5)
SODIUM SERPL-SCNC: 138 MMOL/L (ref 136–145)
TRIGL SERPL-MCNC: 110 MG/DL (ref 0–150)
VLDLC SERPL-MCNC: 20 MG/DL (ref 5–40)

## 2023-05-16 PROCEDURE — 80061 LIPID PANEL: CPT

## 2023-05-16 PROCEDURE — 83036 HEMOGLOBIN GLYCOSYLATED A1C: CPT

## 2023-05-16 PROCEDURE — 82043 UR ALBUMIN QUANTITATIVE: CPT

## 2023-05-16 PROCEDURE — 80053 COMPREHEN METABOLIC PANEL: CPT

## 2023-05-16 PROCEDURE — 36415 COLL VENOUS BLD VENIPUNCTURE: CPT

## 2023-05-16 RX ORDER — DICLOFENAC SODIUM 75 MG/1
75 TABLET, DELAYED RELEASE ORAL 2 TIMES DAILY WITH MEALS
Qty: 180 TABLET | Refills: 3 | Status: SHIPPED | OUTPATIENT
Start: 2023-05-16

## 2023-05-16 RX ORDER — AMLODIPINE BESYLATE 10 MG/1
10 TABLET ORAL DAILY
Qty: 90 TABLET | Refills: 3 | Status: SHIPPED | OUTPATIENT
Start: 2023-05-16

## 2023-05-16 RX ORDER — ATORVASTATIN CALCIUM 10 MG/1
10 TABLET, FILM COATED ORAL DAILY
Qty: 90 TABLET | Refills: 3 | Status: SHIPPED | OUTPATIENT
Start: 2023-05-16

## 2023-05-16 RX ORDER — METFORMIN HYDROCHLORIDE 500 MG/1
1000 TABLET, EXTENDED RELEASE ORAL DAILY
Qty: 180 TABLET | Refills: 1 | Status: SHIPPED | OUTPATIENT
Start: 2023-05-16

## 2023-05-16 RX ORDER — FAMOTIDINE 20 MG/1
20 TABLET, FILM COATED ORAL
Qty: 90 TABLET | Refills: 3 | Status: SHIPPED | OUTPATIENT
Start: 2023-05-16

## 2023-05-16 RX ORDER — HYDROCHLOROTHIAZIDE 25 MG/1
25 TABLET ORAL DAILY
Qty: 90 TABLET | Refills: 3 | Status: SHIPPED | OUTPATIENT
Start: 2023-05-16

## 2023-05-16 NOTE — PROGRESS NOTES
The ABCs of the Annual Wellness Visit  Subsequent Medicare Wellness Visit    Subjective    Andrea Diaz is a 67 y.o. male who presents for a Subsequent Medicare Wellness Visit.    The following portions of the patient's history were reviewed and updated as appropriate: allergies, current medications, past family history, past medical history, past social history, past surgical history and problem list.    Compared to one year ago, the patient feels his physical health is better.    Compared to one year ago, the patient feels his mental health is better.    Recent Hospitalizations:  He was not admitted to the hospital during the last year.     Current Medical Providers:  Patient Care Team:  Velasquez Daniel MD as PCP - General (Family Medicine)    Outpatient Medications Prior to Visit   Medication Sig Dispense Refill   • glucosamine sulfate 500 MG capsule capsule Take  by mouth 2 (Two) Times a Day With Meals.     • amLODIPine (NORVASC) 10 MG tablet Take 1 tablet by mouth Daily. 90 tablet 1   • atorvastatin (LIPITOR) 10 MG tablet Take 1 tablet by mouth Daily. 90 tablet 1   • diclofenac (VOLTAREN) 75 MG EC tablet Take 1 tablet by mouth 2 (Two) Times a Day With Meals. 180 tablet 1   • famotidine (PEPCID) 20 MG tablet Take 1 tablet by mouth every night at bedtime. 90 tablet 1   • hydroCHLOROthiazide (HYDRODIURIL) 25 MG tablet Take 1 tablet by mouth Daily. 90 tablet 1   • metFORMIN ER (GLUCOPHAGE-XR) 500 MG 24 hr tablet Take 2 tablets by mouth Daily. 180 tablet 1     No facility-administered medications prior to visit.       No opioid medication identified on active medication list. I have reviewed chart for other potential  high risk medication/s and harmful drug interactions in the elderly.          Aspirin is not on active medication list.  Aspirin use is not indicated based on review of current medical condition/s. Risk of harm outweighs potential benefits.  .    Patient Active Problem List   Diagnosis  "  • Family history of colon cancer   • Screening for colon cancer   • Type 2 diabetes mellitus without complication, without long-term current use of insulin   • Pure hypercholesterolemia   • Primary generalized (osteo)arthritis   • Essential hypertension   • Mixed hyperlipidemia   • Morbid obesity     Advance Care Planning  Advance Directive is not on file.  ACP discussion was held with the patient during this visit. Patient does not have an advance directive, information provided.  His children would make decisions if needed.     Objective    Vitals:    23 0926   BP: 144/76   BP Location: Right arm   Patient Position: Sitting   Pulse: 77   Temp: 98 °F (36.7 °C)   TempSrc: Oral   Weight: 126 kg (278 lb 9.6 oz)   Height: 171.5 cm (67.52\")     Estimated body mass index is 42.97 kg/m² as calculated from the following:    Height as of this encounter: 171.5 cm (67.52\").    Weight as of this encounter: 126 kg (278 lb 9.6 oz).    Class 3 Severe Obesity (BMI >=40). Obesity-related health conditions include the following: hypertension, diabetes mellitus and dyslipidemias. Obesity is improving with lifestyle modifications. BMI is is above average; BMI management plan is completed. We discussed portion control and increasing exercise.    Does the patient have evidence of cognitive impairment? No        HEALTH RISK ASSESSMENT    Smoking Status:  Social History     Tobacco Use   Smoking Status Never   Smokeless Tobacco Never     Alcohol Consumption:  Social History     Substance and Sexual Activity   Alcohol Use Not Currently     Fall Risk Screen:    MARCOADI Fall Risk Assessment was completed, and patient is at LOW risk for falls.Assessment completed on:2023    Depression Screenin/16/2023     9:24 AM   PHQ-2/PHQ-9 Depression Screening   Little Interest or Pleasure in Doing Things 0-->not at all   Feeling Down, Depressed or Hopeless 0-->not at all   PHQ-9: Brief Depression Severity Measure Score 0       Health " Habits and Functional and Cognitive Screenin/16/2023     9:24 AM   Functional & Cognitive Status   Do you have difficulty preparing food and eating? No   Do you have difficulty bathing yourself, getting dressed or grooming yourself? No   Do you have difficulty using the toilet? No   Do you have difficulty moving around from place to place? No   Do you have trouble with steps or getting out of a bed or a chair? No   Current Diet Unhealthy Diet   Dental Exam Up to date   Eye Exam Up to date   Exercise (times per week) 0 times per week   Current Exercises Include No Regular Exercise   Do you need help using the phone?  No   Are you deaf or do you have serious difficulty hearing?  No   Do you need help with transportation? No   Do you need help shopping? No   Do you need help preparing meals?  No   Do you need help with housework?  No   Do you need help with laundry? No   Do you need help taking your medications? No   Do you need help managing money? No   Do you ever drive or ride in a car without wearing a seat belt? No   Have you felt unusual stress, anger or loneliness in the last month? Yes   Who do you live with? Alone   If you need help, do you have trouble finding someone available to you? No   Have you been bothered in the last four weeks by sexual problems? No   Do you have difficulty concentrating, remembering or making decisions? No       Age-appropriate Screening Schedule:  Refer to the list below for future screening recommendations based on patient's age, sex and/or medical conditions. Orders for these recommended tests are listed in the plan section. The patient has been provided with a written plan.    Health Maintenance   Topic Date Due   • TDAP/TD VACCINES (1 - Tdap) Never done   • ZOSTER VACCINE (3 of 3) 10/24/2018   • LIPID PANEL  03/15/2023   • URINE MICROALBUMIN  03/15/2023   • HEMOGLOBIN A1C  2023   • COVID-19 Vaccine (3 - Booster for Pfizer series) 2023 (Originally 2021)    • INFLUENZA VACCINE  08/01/2023   • DIABETIC EYE EXAM  10/18/2023   • ANNUAL WELLNESS VISIT  05/16/2024   • DIABETIC FOOT EXAM  05/16/2024   • COLORECTAL CANCER SCREENING  11/20/2027   • HEPATITIS C SCREENING  Completed   • Pneumococcal Vaccine 65+  Completed                CMS Preventative Services Quick Reference  Risk Factors Identified During Encounter  Chronic Pain: NSAIDs per medication orders.  Immunizations Discussed/Encouraged: Tdap  The above risks/problems have been discussed with the patient.  Pertinent information has been shared with the patient in the After Visit Summary.  An After Visit Summary and PPPS were made available to the patient.    Follow Up:   Next Medicare Wellness visit to be scheduled in 1 year.       Additional E&M Note during same encounter follows:  Patient has multiple medical problems which are significant and separately identifiable that require additional work above and beyond the Medicare Wellness Visit.      Chief Complaint:  Diabetes, blood pressure, cholesterol    Subjective        In addition to the Medicare wellness exam, Marcelino is also here for follow-up on his usual care.  He has type 2 diabetes for which she remains on metformin as noted.  He is not currently checking his blood sugars.  He is due for recheck on his blood work today.    Marcelino also has hypertension and elevated cholesterol for which he remains on treatments as noted above.  His blood pressure is mildly elevated today.  He does not currently check his blood pressure.    He continues to take arthritis medication regularly.  He continues to have issues with hand pain.    Review of Systems:  Review of Systems   Constitutional: Negative for chills and fever.   Respiratory: Negative for cough and shortness of breath.    Cardiovascular: Negative for chest pain and palpitations.   Gastrointestinal: Negative for abdominal pain, nausea and vomiting.        Objective   Vital Signs:  Vitals:    05/16/23 0926 05/16/23  "1015   BP: 144/76 135/69   BP Location: Right arm Right arm   Patient Position: Sitting Sitting   Pulse: 77 77   Temp: 98 °F (36.7 °C)    TempSrc: Oral    Weight: 126 kg (278 lb 9.6 oz)    Height: 171.5 cm (67.52\")    Body mass index is 42.97 kg/m².     Physical Exam  Vitals and nursing note reviewed.   Constitutional:       General: He is not in acute distress.     Appearance: He is obese. He is not ill-appearing.   HENT:      Right Ear: Tympanic membrane and ear canal normal.      Left Ear: Tympanic membrane and ear canal normal.      Ears:      Comments: Hearing is normal with forced whisper bilaterally.     Mouth/Throat:      Mouth: Mucous membranes are moist.      Comments: Pharynx appears normal  Eyes:      Extraocular Movements: Extraocular movements intact.      Pupils: Pupils are equal, round, and reactive to light.      Comments: Binocular vision is 20/20 with correction.   Neck:      Thyroid: No thyromegaly.   Cardiovascular:      Rate and Rhythm: Normal rate and regular rhythm.      Pulses:           Dorsalis pedis pulses are 2+ on the right side and 2+ on the left side.      Heart sounds: No murmur heard.  Pulmonary:      Effort: Pulmonary effort is normal.      Breath sounds: Normal breath sounds.   Abdominal:      General: There is no distension.      Palpations: Abdomen is soft. There is no mass.      Tenderness: There is no abdominal tenderness.   Musculoskeletal:      Cervical back: Normal range of motion.   Feet:      Right foot:      Protective Sensation: 3 sites tested. 3 sites sensed.      Skin integrity: Skin integrity normal.      Toenail Condition: Right toenails are abnormally thick.      Left foot:      Protective Sensation: 3 sites tested. 3 sites sensed.      Skin integrity: Skin integrity normal.      Toenail Condition: Left toenails are abnormally thick.   Skin:     Findings: No lesion or rash.   Neurological:      General: No focal deficit present.      Mental Status: He is oriented " to person, place, and time.      Cranial Nerves: No cranial nerve deficit.   Psychiatric:         Mood and Affect: Mood normal.       The following data was reviewed by Velasquez Daniel MD on 05/16/2023.  Lab Results   Component Value Date    WBC 7.57 11/14/2022    HGB 15.6 11/14/2022    HCT 45.1 11/14/2022    MCV 88.4 11/14/2022     11/14/2022     Lab Results   Component Value Date    GLUCOSE 122 (H) 11/14/2022    BUN 16 11/14/2022    CREATININE 1.04 11/14/2022     11/14/2022    K 4.5 11/14/2022    CL 99 11/14/2022    CO2 26.4 11/14/2022    CALCIUM 10.1 11/14/2022    PROTEINTOT 7.6 11/14/2022    ALBUMIN 4.50 11/14/2022    ALT 33 11/14/2022    AST 18 11/14/2022    ALKPHOS 79 11/14/2022    BILITOT 0.5 11/14/2022    EGFR 77.7 11/14/2022    GLOB 3.1 11/14/2022    AGRATIO 1.5 11/14/2022    BCR 15.4 11/14/2022    ANIONGAP 12.6 11/14/2022      Lab Results   Component Value Date    CHOL 126 03/15/2022    CHLPL 136 01/05/2021    TRIG 83 03/15/2022    HDL 36 (L) 03/15/2022    LDL 74 03/15/2022     Lab Results   Component Value Date    TSH 1.840 03/15/2022     Lab Results   Component Value Date    HGBA1C 6.80 (H) 11/14/2022     Lab Results   Component Value Date    PSA 0.615 11/14/2022    PSA 0.487 07/30/2021    PSA 0.50 06/10/2020                 Assessment and Plan:       Today, we have reviewed his care.  Regarding the Medicare wellness exam, Marcelino is basically up-to-date on recommended cancer screenings.  He is also up-to-date on most vaccines.  He might want to consider a tetanus booster if he were to have a cut or scrape, particularly with jen metal.  Please see the above.    Regarding his usual care, we will refill needed medications as noted below.  Labs to be updated as well.  It remains an open question whether to consider GLP-1 directed therapy to help with his weight and diabetes.  He has no family history of multiple endocrine neoplasia or medullary thyroid carcinoma.  Marcelino has no personal  history of pancreatitis.  Again, we will review his testing and then move forward from there.  His blood pressure is mildly elevated, we will recheck it before he leaves.    Diagnoses and all orders for this visit:    1. Physical exam (Primary)    2. Type 2 diabetes mellitus without complication, without long-term current use of insulin  -     MicroAlbumin, Urine, Random - Urine, Clean Catch; Future  -     Hemoglobin A1c; Future  -     metFORMIN ER (GLUCOPHAGE-XR) 500 MG 24 hr tablet; Take 2 tablets by mouth Daily.  Dispense: 180 tablet; Refill: 1    3. Essential hypertension  -     Comprehensive Metabolic Panel; Future  -     amLODIPine (NORVASC) 10 MG tablet; Take 1 tablet by mouth Daily.  Dispense: 90 tablet; Refill: 3  -     hydroCHLOROthiazide (HYDRODIURIL) 25 MG tablet; Take 1 tablet by mouth Daily.  Dispense: 90 tablet; Refill: 3    4. Mixed hyperlipidemia  -     Lipid Panel; Future  -     atorvastatin (LIPITOR) 10 MG tablet; Take 1 tablet by mouth Daily.  Dispense: 90 tablet; Refill: 3    5. Morbid obesity  Comments:  Consider GLP-1 agonist.    6. Onychodystrophy  -     Ambulatory Referral to Podiatry    7. Primary generalized (osteo)arthritis  -     diclofenac (VOLTAREN) 75 MG EC tablet; Take 1 tablet by mouth 2 (Two) Times a Day With Meals.  Dispense: 180 tablet; Refill: 3    Other orders  -     famotidine (PEPCID) 20 MG tablet; Take 1 tablet by mouth every night at bedtime.  Dispense: 90 tablet; Refill: 3         Follow Up   Return in about 6 months (around 11/16/2023) for Recheck.  Patient was given instructions and counseling regarding his condition or for health maintenance advice. Please see specific information pulled into the AVS if appropriate.

## 2023-06-07 ENCOUNTER — TELEPHONE (OUTPATIENT)
Dept: FAMILY MEDICINE CLINIC | Age: 68
End: 2023-06-07
Payer: MEDICARE

## 2023-06-07 NOTE — TELEPHONE ENCOUNTER
----- Message from Meghan Frenhc LPN sent at 5/17/2023  9:39 AM EDT -----  TICKLE to call him in 3 weeks and see if he is tolerating it okay and whether we can go ahead and increase the dose.

## 2023-06-07 NOTE — TELEPHONE ENCOUNTER
Pt states he never picked up the Trulicity, heard too many bad things about it, states he has been watching what he eats and been exercising more, is down to 267

## 2023-06-08 NOTE — TELEPHONE ENCOUNTER
Noted.  I have remove the medication from his list.  Please TICKLE for fingerstick A1c after 8/15/2023.  Thanks.

## 2023-08-16 ENCOUNTER — TELEPHONE (OUTPATIENT)
Dept: FAMILY MEDICINE CLINIC | Age: 68
End: 2023-08-16
Payer: MEDICARE

## 2023-08-16 NOTE — TELEPHONE ENCOUNTER
----- Message from Meghan French LPN sent at 6/8/2023  8:59 AM EDT -----  TICKLE for fingerstick A1c after 8/15/2023.  Thanks.

## 2023-08-21 ENCOUNTER — CLINICAL SUPPORT (OUTPATIENT)
Dept: FAMILY MEDICINE CLINIC | Age: 68
End: 2023-08-21
Payer: MEDICARE

## 2023-08-21 DIAGNOSIS — E11.9 TYPE 2 DIABETES MELLITUS WITHOUT COMPLICATION, WITHOUT LONG-TERM CURRENT USE OF INSULIN: Primary | ICD-10-CM

## 2023-08-21 LAB
EXPIRATION DATE: NORMAL
HBA1C MFR BLD: 6.3 %
Lab: NORMAL

## 2023-08-21 PROCEDURE — 83036 HEMOGLOBIN GLYCOSYLATED A1C: CPT | Performed by: FAMILY MEDICINE

## 2023-08-21 PROCEDURE — 3044F HG A1C LEVEL LT 7.0%: CPT | Performed by: FAMILY MEDICINE

## 2023-11-08 DIAGNOSIS — I10 ESSENTIAL HYPERTENSION: ICD-10-CM

## 2023-11-08 RX ORDER — AMLODIPINE BESYLATE 10 MG/1
10 TABLET ORAL DAILY
Qty: 90 TABLET | Refills: 3 | OUTPATIENT
Start: 2023-11-08

## 2023-11-14 ENCOUNTER — OFFICE VISIT (OUTPATIENT)
Dept: FAMILY MEDICINE CLINIC | Age: 68
End: 2023-11-14
Payer: MEDICARE

## 2023-11-14 ENCOUNTER — LAB (OUTPATIENT)
Dept: LAB | Facility: HOSPITAL | Age: 68
End: 2023-11-14
Payer: MEDICARE

## 2023-11-14 VITALS
DIASTOLIC BLOOD PRESSURE: 66 MMHG | HEART RATE: 80 BPM | HEIGHT: 68 IN | BODY MASS INDEX: 41.74 KG/M2 | TEMPERATURE: 98.2 F | OXYGEN SATURATION: 96 % | WEIGHT: 275.4 LBS | SYSTOLIC BLOOD PRESSURE: 138 MMHG

## 2023-11-14 DIAGNOSIS — E11.9 TYPE 2 DIABETES MELLITUS WITHOUT COMPLICATION, WITHOUT LONG-TERM CURRENT USE OF INSULIN: Primary | ICD-10-CM

## 2023-11-14 DIAGNOSIS — I10 ESSENTIAL HYPERTENSION: ICD-10-CM

## 2023-11-14 DIAGNOSIS — Z12.5 SCREENING FOR PROSTATE CANCER: ICD-10-CM

## 2023-11-14 DIAGNOSIS — Z23 ENCOUNTER FOR IMMUNIZATION: ICD-10-CM

## 2023-11-14 DIAGNOSIS — Z79.899 OTHER LONG TERM (CURRENT) DRUG THERAPY: ICD-10-CM

## 2023-11-14 DIAGNOSIS — E78.2 MIXED HYPERLIPIDEMIA: ICD-10-CM

## 2023-11-14 DIAGNOSIS — E11.9 TYPE 2 DIABETES MELLITUS WITHOUT COMPLICATION, WITHOUT LONG-TERM CURRENT USE OF INSULIN: ICD-10-CM

## 2023-11-14 DIAGNOSIS — M15.0 PRIMARY GENERALIZED (OSTEO)ARTHRITIS: ICD-10-CM

## 2023-11-14 LAB
ALBUMIN SERPL-MCNC: 4.6 G/DL (ref 3.5–5.2)
ALBUMIN/GLOB SERPL: 1.6 G/DL
ALP SERPL-CCNC: 75 U/L (ref 39–117)
ALT SERPL W P-5'-P-CCNC: 30 U/L (ref 1–41)
ANION GAP SERPL CALCULATED.3IONS-SCNC: 10 MMOL/L (ref 5–15)
AST SERPL-CCNC: 22 U/L (ref 1–40)
BILIRUB SERPL-MCNC: 0.6 MG/DL (ref 0–1.2)
BUN SERPL-MCNC: 20 MG/DL (ref 8–23)
BUN/CREAT SERPL: 17.2 (ref 7–25)
CALCIUM SPEC-SCNC: 9.9 MG/DL (ref 8.6–10.5)
CHLORIDE SERPL-SCNC: 100 MMOL/L (ref 98–107)
CO2 SERPL-SCNC: 28 MMOL/L (ref 22–29)
CREAT SERPL-MCNC: 1.16 MG/DL (ref 0.76–1.27)
DEPRECATED RDW RBC AUTO: 37.9 FL (ref 37–54)
EGFRCR SERPLBLD CKD-EPI 2021: 67.8 ML/MIN/1.73
ERYTHROCYTE [DISTWIDTH] IN BLOOD BY AUTOMATED COUNT: 11.7 % (ref 12.3–15.4)
FOLATE SERPL-MCNC: 13.8 NG/ML (ref 4.78–24.2)
GLOBULIN UR ELPH-MCNC: 2.9 GM/DL
GLUCOSE SERPL-MCNC: 144 MG/DL (ref 65–99)
HCT VFR BLD AUTO: 43.3 % (ref 37.5–51)
HGB BLD-MCNC: 15 G/DL (ref 13–17.7)
MCH RBC QN AUTO: 30.4 PG (ref 26.6–33)
MCHC RBC AUTO-ENTMCNC: 34.6 G/DL (ref 31.5–35.7)
MCV RBC AUTO: 87.8 FL (ref 79–97)
PLATELET # BLD AUTO: 216 10*3/MM3 (ref 140–450)
PMV BLD AUTO: 10.1 FL (ref 6–12)
POTASSIUM SERPL-SCNC: 4.3 MMOL/L (ref 3.5–5.2)
PROT SERPL-MCNC: 7.5 G/DL (ref 6–8.5)
PSA SERPL-MCNC: 0.52 NG/ML (ref 0–4)
RBC # BLD AUTO: 4.93 10*6/MM3 (ref 4.14–5.8)
SODIUM SERPL-SCNC: 138 MMOL/L (ref 136–145)
TSH SERPL DL<=0.05 MIU/L-ACNC: 1.83 UIU/ML (ref 0.27–4.2)
VIT B12 BLD-MCNC: 388 PG/ML (ref 211–946)
WBC NRBC COR # BLD: 5.4 10*3/MM3 (ref 3.4–10.8)

## 2023-11-14 PROCEDURE — 82746 ASSAY OF FOLIC ACID SERUM: CPT

## 2023-11-14 PROCEDURE — 36415 COLL VENOUS BLD VENIPUNCTURE: CPT

## 2023-11-14 PROCEDURE — 80053 COMPREHEN METABOLIC PANEL: CPT

## 2023-11-14 PROCEDURE — G0103 PSA SCREENING: HCPCS

## 2023-11-14 PROCEDURE — 83036 HEMOGLOBIN GLYCOSYLATED A1C: CPT | Performed by: FAMILY MEDICINE

## 2023-11-14 PROCEDURE — 82607 VITAMIN B-12: CPT

## 2023-11-14 PROCEDURE — 85027 COMPLETE CBC AUTOMATED: CPT

## 2023-11-14 PROCEDURE — 84443 ASSAY THYROID STIM HORMONE: CPT

## 2023-11-14 RX ORDER — AMLODIPINE BESYLATE 10 MG/1
10 TABLET ORAL DAILY
Qty: 90 TABLET | Refills: 3 | Status: SHIPPED | OUTPATIENT
Start: 2023-11-14

## 2023-11-14 RX ORDER — HYDROCHLOROTHIAZIDE 25 MG/1
25 TABLET ORAL DAILY
Qty: 90 TABLET | Refills: 3 | Status: SHIPPED | OUTPATIENT
Start: 2023-11-14

## 2023-11-14 RX ORDER — METFORMIN HYDROCHLORIDE 500 MG/1
1000 TABLET, EXTENDED RELEASE ORAL DAILY
Qty: 180 TABLET | Refills: 3 | Status: SHIPPED | OUTPATIENT
Start: 2023-11-14

## 2023-11-14 RX ORDER — ATORVASTATIN CALCIUM 10 MG/1
10 TABLET, FILM COATED ORAL DAILY
Qty: 90 TABLET | Refills: 3 | Status: SHIPPED | OUTPATIENT
Start: 2023-11-14

## 2023-11-14 RX ORDER — FAMOTIDINE 20 MG/1
20 TABLET, FILM COATED ORAL
Qty: 90 TABLET | Refills: 3 | Status: SHIPPED | OUTPATIENT
Start: 2023-11-14

## 2023-11-14 RX ORDER — DICLOFENAC SODIUM 75 MG/1
75 TABLET, DELAYED RELEASE ORAL 2 TIMES DAILY WITH MEALS
Qty: 180 TABLET | Refills: 3 | Status: SHIPPED | OUTPATIENT
Start: 2023-11-14

## 2023-11-14 NOTE — PROGRESS NOTES
Andrea Diaz presents to [facility].    Chief Complaint:  Diabetes, blood pressure, cholesterol    Subjective   History of Present Illness:  Marcelino is in today for follow-up on his care.  He has type 2 diabetes for which he remains on metformin.  His A1c in August was in a good range at 6.3%.  He does not routinely check his blood sugar.  He would very much like to stop medication if possible.    He also has hypertension and elevated cholesterol for which he is on treatments as noted.  His blood pressure is mildly elevated this morning.  He has not taken his medications yet this morning.    He has noted some tingling in his feet.  He is unsure if this is related to medication or possibly due to diabetes.  This can vary.  It is not a daily issue for him.    He also has osteoarthritis and remains on diclofenac.  He seems to tolerate it fairly well.  Risks are again discussed.    Review of Systems:  Review of Systems   Constitutional:  Negative for chills and fever.   Respiratory:  Negative for cough and shortness of breath.    Cardiovascular:  Negative for chest pain and palpitations.   Gastrointestinal:  Positive for abdominal pain (Occasional lower abdominal cramping). Negative for nausea and vomiting.      Objective   Medical History:  Past Medical History:    Essential (primary) hypertension    Other long term (current) drug therapy    Primary generalized (osteo)arthritis    Pure hypercholesterolemia    Type 2 diabetes mellitus without complication    Unilateral primary osteoarthritis, left knee     Past Surgical History:    COLONOSCOPY    Hyperplastic polyp, hemorrhoids    JOINT REPLACEMENT    knee    KNEE ARTHROPLASTY    KNEE SURGERY    meniscus      Family History   Problem Relation Age of Onset    Coronary artery disease Mother     Pancreatic cancer Father     Diabetes type II Father     Colon cancer Other      Social History     Tobacco Use    Smoking status: Never      "Passive exposure: Past    Smokeless tobacco: Never   Substance Use Topics    Alcohol use: Not Currently       Health Maintenance Due   Topic Date Due    TDAP/TD VACCINES (1 - Tdap) Never done    INFLUENZA VACCINE  08/01/2023        Immunization History   Administered Date(s) Administered    COVID-19 (PFIZER) Purple Cap Monovalent 03/17/2021, 04/07/2021    Fluzone High Dose =>65 Years ([employer] ONLY) 11/25/2022    Hepatitis A 06/09/2018, 06/11/2018, 01/02/2019    Influenza, Unspecified 01/05/2021    Pneumococcal Conjugate 13-Valent (PCV13) 10/26/2018    Pneumococcal Polysaccharide (PPSV23) 10/01/2019    Shingrix 06/09/2018, 08/29/2018    Zostavax 10/09/2013       No Known Allergies     Medications:  Current Outpatient Medications on File Prior to Visit   Medication Sig    glucosamine sulfate 500 MG capsule capsule Take  by mouth 2 (Two) Times a Day With Meals.    [DISCONTINUED] amLODIPine (NORVASC) 10 MG tablet Take 1 tablet by mouth Daily.    [DISCONTINUED] atorvastatin (LIPITOR) 10 MG tablet Take 1 tablet by mouth Daily.    [DISCONTINUED] diclofenac (VOLTAREN) 75 MG EC tablet Take 1 tablet by mouth 2 (Two) Times a Day With Meals.    [DISCONTINUED] famotidine (PEPCID) 20 MG tablet Take 1 tablet by mouth every night at bedtime.    [DISCONTINUED] hydroCHLOROthiazide (HYDRODIURIL) 25 MG tablet Take 1 tablet by mouth Daily.    [DISCONTINUED] metFORMIN ER (GLUCOPHAGE-XR) 500 MG 24 hr tablet Take 2 tablets by mouth Daily.     No current facility-administered medications on file prior to visit.       Vital Signs:   Vitals:    11/14/23 0826 11/14/23 0829 11/14/23 0856   BP: 168/93 154/88 138/66   BP Location: Left arm Right arm Left arm   Patient Position: Sitting Sitting Sitting   Cuff Size: Large Adult Large Adult Large Adult   Pulse: 80 77 80   Temp: 98.2 °F (36.8 °C)     TempSrc: Oral     SpO2: 96%     Weight: 125 kg (275 lb 6.4 oz)     Height: 171.5 cm (67.52\")     Body mass index is 42.47 kg/m².    Physical " Exam:  Physical Exam  Vitals reviewed.   Constitutional:       General: He is not in acute distress.     Appearance: He is obese. He is not ill-appearing.   Eyes:      Pupils: Pupils are equal, round, and reactive to light.   Neck:      Comments: No thyromegaly  Cardiovascular:      Rate and Rhythm: Normal rate and regular rhythm.   Pulmonary:      Effort: Pulmonary effort is normal.      Breath sounds: Normal breath sounds.   Abdominal:      General: There is no distension.      Palpations: Abdomen is soft.      Tenderness: There is no abdominal tenderness.   Musculoskeletal:      Cervical back: Neck supple.   Lymphadenopathy:      Cervical: No cervical adenopathy.   Skin:     Findings: No lesion or rash.   Neurological:      Mental Status: He is alert.       Result Review   The following data was reviewed by Velasquez Daniel MD on 11/14/2023.  Lab Results   Component Value Date    WBC 7.57 11/14/2022    HGB 15.6 11/14/2022    HCT 45.1 11/14/2022    MCV 88.4 11/14/2022     11/14/2022     Lab Results   Component Value Date    GLUCOSE 140 (H) 05/16/2023    BUN 23 05/16/2023    CREATININE 1.20 05/16/2023     05/16/2023    K 4.1 05/16/2023     05/16/2023    CO2 25.1 05/16/2023    CALCIUM 9.3 05/16/2023    PROTEINTOT 7.0 05/16/2023    ALBUMIN 4.3 05/16/2023    ALT 39 05/16/2023    AST 20 05/16/2023    ALKPHOS 75 05/16/2023    BILITOT 0.4 05/16/2023    EGFR 65.5 05/16/2023    GLOB 2.7 05/16/2023    AGRATIO 1.6 05/16/2023    BCR 19.2 05/16/2023    ANIONGAP 9.9 05/16/2023      Lab Results   Component Value Date    CHOL 127 05/16/2023    CHLPL 136 01/05/2021    TRIG 110 05/16/2023    HDL 34 (L) 05/16/2023    LDL 73 05/16/2023     Lab Results   Component Value Date    TSH 1.840 03/15/2022     Lab Results   Component Value Date    HGBA1C 6.3 08/21/2023     Lab Results   Component Value Date    PSA 0.615 11/14/2022    PSA 0.487 07/30/2021    PSA 0.50 06/10/2020          Assessment and Plan:   Today, we  have reviewed Marcelino's care.  He seems well today.  His blood pressure is elevated on initial checks, but he has not taken his medication this morning.  For the near term, we will refill his medications as noted below.  I did ask him to NOT take metformin for a week or so to see if it has any obvious impact on the lower abdominal issue that he discussed.  We will do some blood work related to the possible neuropathy.  Otherwise, we will refill medications and update labs as noted below.  Tentative follow-up will be again in about 6 months.  Flu shot today.  He declines COVID-19 booster.    Diagnoses and all orders for this visit:    1. Type 2 diabetes mellitus without complication, without long-term current use of insulin (Primary)  -     Comprehensive Metabolic Panel; Future  -     metFORMIN ER (GLUCOPHAGE-XR) 500 MG 24 hr tablet; Take 2 tablets by mouth Daily.  Dispense: 180 tablet; Refill: 3    2. Essential hypertension  -     Comprehensive Metabolic Panel; Future  -     amLODIPine (NORVASC) 10 MG tablet; Take 1 tablet by mouth Daily.  Dispense: 90 tablet; Refill: 3  -     hydroCHLOROthiazide (HYDRODIURIL) 25 MG tablet; Take 1 tablet by mouth Daily.  Dispense: 90 tablet; Refill: 3    3. Mixed hyperlipidemia  -     Comprehensive Metabolic Panel; Future  -     TSH; Future  -     atorvastatin (LIPITOR) 10 MG tablet; Take 1 tablet by mouth Daily.  Dispense: 90 tablet; Refill: 3    4. Primary generalized (osteo)arthritis  -     diclofenac (VOLTAREN) 75 MG EC tablet; Take 1 tablet by mouth 2 (Two) Times a Day With Meals.  Dispense: 180 tablet; Refill: 3    5. Other long term (current) drug therapy  -     CBC (No Diff); Future  -     Vitamin B12 & Folate; Future    6. Screening for prostate cancer  -     PSA Screen; Future    7. Encounter for immunization  -     Fluzone High-Dose 65+yrs ([id number])    Other orders  -     famotidine (PEPCID) 20 MG tablet; Take 1 tablet by mouth every night at bedtime.  Dispense: 90  tablet; Refill: 3    Follow Up  Return in about 6 months (around 5/17/2024) for Medicare Wellness, Recheck.  Patient was given instructions and counseling regarding his condition or for health maintenance advice. Please see specific information pulled into the AVS if appropriate.

## 2023-11-15 LAB — HBA1C MFR BLD: 6.5 % (ref 4.8–5.6)

## 2023-11-21 DIAGNOSIS — E11.9 TYPE 2 DIABETES MELLITUS WITHOUT COMPLICATION, WITHOUT LONG-TERM CURRENT USE OF INSULIN: ICD-10-CM

## 2023-11-22 RX ORDER — METFORMIN HYDROCHLORIDE 500 MG/1
1000 TABLET, EXTENDED RELEASE ORAL DAILY
Qty: 180 TABLET | Refills: 3 | Status: SHIPPED | OUTPATIENT
Start: 2023-11-22

## 2023-11-30 ENCOUNTER — TELEPHONE (OUTPATIENT)
Dept: FAMILY MEDICINE CLINIC | Age: 68
End: 2023-11-30
Payer: MEDICARE

## 2023-11-30 NOTE — TELEPHONE ENCOUNTER
Noted.  I would recommend he stay off metformin indefinitely.  I have flagged it so that we do not give it to him again.  He may discard any remaining metformin that he has.  Thanks.

## 2023-11-30 NOTE — TELEPHONE ENCOUNTER
----- Message from Meghan French LPN sent at 11/16/2023  8:43 AM EST -----  [TICKLE call him 2 weeks; any improvement with abdominal pain while holding metformin

## 2024-05-20 ENCOUNTER — LAB (OUTPATIENT)
Dept: LAB | Facility: HOSPITAL | Age: 69
End: 2024-05-20
Payer: MEDICARE

## 2024-05-20 ENCOUNTER — OFFICE VISIT (OUTPATIENT)
Dept: FAMILY MEDICINE CLINIC | Age: 69
End: 2024-05-20
Payer: MEDICARE

## 2024-05-20 VITALS
TEMPERATURE: 98 F | WEIGHT: 282.4 LBS | HEIGHT: 68 IN | DIASTOLIC BLOOD PRESSURE: 81 MMHG | SYSTOLIC BLOOD PRESSURE: 146 MMHG | BODY MASS INDEX: 42.8 KG/M2 | OXYGEN SATURATION: 95 % | HEART RATE: 77 BPM

## 2024-05-20 DIAGNOSIS — M15.0 PRIMARY GENERALIZED (OSTEO)ARTHRITIS: ICD-10-CM

## 2024-05-20 DIAGNOSIS — I10 ESSENTIAL HYPERTENSION: ICD-10-CM

## 2024-05-20 DIAGNOSIS — E66.01 MORBID OBESITY: ICD-10-CM

## 2024-05-20 DIAGNOSIS — E78.2 MIXED HYPERLIPIDEMIA: ICD-10-CM

## 2024-05-20 DIAGNOSIS — E11.9 TYPE 2 DIABETES MELLITUS WITHOUT COMPLICATION, WITHOUT LONG-TERM CURRENT USE OF INSULIN: ICD-10-CM

## 2024-05-20 DIAGNOSIS — Z79.899 OTHER LONG TERM (CURRENT) DRUG THERAPY: ICD-10-CM

## 2024-05-20 DIAGNOSIS — Z00.00 PHYSICAL EXAM: Primary | ICD-10-CM

## 2024-05-20 LAB
ALBUMIN SERPL-MCNC: 4.3 G/DL (ref 3.5–5.2)
ALBUMIN UR-MCNC: <1.2 MG/DL
ALBUMIN/GLOB SERPL: 1.6 G/DL
ALP SERPL-CCNC: 77 U/L (ref 39–117)
ALT SERPL W P-5'-P-CCNC: 29 U/L (ref 1–41)
ANION GAP SERPL CALCULATED.3IONS-SCNC: 9 MMOL/L (ref 5–15)
AST SERPL-CCNC: 13 U/L (ref 1–40)
BILIRUB SERPL-MCNC: 0.4 MG/DL (ref 0–1.2)
BUN SERPL-MCNC: 20 MG/DL (ref 8–23)
BUN/CREAT SERPL: 19.6 (ref 7–25)
CALCIUM SPEC-SCNC: 8.8 MG/DL (ref 8.6–10.5)
CHLORIDE SERPL-SCNC: 104 MMOL/L (ref 98–107)
CHOLEST SERPL-MCNC: 125 MG/DL (ref 0–200)
CO2 SERPL-SCNC: 23 MMOL/L (ref 22–29)
CREAT SERPL-MCNC: 1.02 MG/DL (ref 0.76–1.27)
DEPRECATED RDW RBC AUTO: 38.6 FL (ref 37–54)
EGFRCR SERPLBLD CKD-EPI 2021: 79.1 ML/MIN/1.73
ERYTHROCYTE [DISTWIDTH] IN BLOOD BY AUTOMATED COUNT: 11.8 % (ref 12.3–15.4)
GLOBULIN UR ELPH-MCNC: 2.7 GM/DL
GLUCOSE SERPL-MCNC: 176 MG/DL (ref 65–99)
HBA1C MFR BLD: 8.4 % (ref 4.8–5.6)
HCT VFR BLD AUTO: 41.1 % (ref 37.5–51)
HDLC SERPL-MCNC: 35 MG/DL (ref 40–60)
HGB BLD-MCNC: 14.1 G/DL (ref 13–17.7)
LDLC SERPL CALC-MCNC: 69 MG/DL (ref 0–100)
LDLC/HDLC SERPL: 1.93 {RATIO}
MCH RBC QN AUTO: 30.3 PG (ref 26.6–33)
MCHC RBC AUTO-ENTMCNC: 34.3 G/DL (ref 31.5–35.7)
MCV RBC AUTO: 88.4 FL (ref 79–97)
PLATELET # BLD AUTO: 212 10*3/MM3 (ref 140–450)
PMV BLD AUTO: 10.8 FL (ref 6–12)
POTASSIUM SERPL-SCNC: 4 MMOL/L (ref 3.5–5.2)
PROT SERPL-MCNC: 7 G/DL (ref 6–8.5)
RBC # BLD AUTO: 4.65 10*6/MM3 (ref 4.14–5.8)
SODIUM SERPL-SCNC: 136 MMOL/L (ref 136–145)
TRIGL SERPL-MCNC: 113 MG/DL (ref 0–150)
VLDLC SERPL-MCNC: 21 MG/DL (ref 5–40)
WBC NRBC COR # BLD AUTO: 6.93 10*3/MM3 (ref 3.4–10.8)

## 2024-05-20 PROCEDURE — 83036 HEMOGLOBIN GLYCOSYLATED A1C: CPT

## 2024-05-20 PROCEDURE — 80053 COMPREHEN METABOLIC PANEL: CPT

## 2024-05-20 PROCEDURE — G0439 PPPS, SUBSEQ VISIT: HCPCS | Performed by: FAMILY MEDICINE

## 2024-05-20 PROCEDURE — 36415 COLL VENOUS BLD VENIPUNCTURE: CPT

## 2024-05-20 PROCEDURE — 3077F SYST BP >= 140 MM HG: CPT | Performed by: FAMILY MEDICINE

## 2024-05-20 PROCEDURE — 1160F RVW MEDS BY RX/DR IN RCRD: CPT | Performed by: FAMILY MEDICINE

## 2024-05-20 PROCEDURE — 82043 UR ALBUMIN QUANTITATIVE: CPT

## 2024-05-20 PROCEDURE — 85027 COMPLETE CBC AUTOMATED: CPT

## 2024-05-20 PROCEDURE — 80061 LIPID PANEL: CPT

## 2024-05-20 PROCEDURE — 1159F MED LIST DOCD IN RCRD: CPT | Performed by: FAMILY MEDICINE

## 2024-05-20 PROCEDURE — 99214 OFFICE O/P EST MOD 30 MIN: CPT | Performed by: FAMILY MEDICINE

## 2024-05-20 PROCEDURE — 1126F AMNT PAIN NOTED NONE PRSNT: CPT | Performed by: FAMILY MEDICINE

## 2024-05-20 PROCEDURE — 3078F DIAST BP <80 MM HG: CPT | Performed by: FAMILY MEDICINE

## 2024-05-20 PROCEDURE — 1170F FXNL STATUS ASSESSED: CPT | Performed by: FAMILY MEDICINE

## 2024-05-20 RX ORDER — HYDROCHLOROTHIAZIDE 25 MG/1
25 TABLET ORAL DAILY
Qty: 90 TABLET | Refills: 3 | Status: SHIPPED | OUTPATIENT
Start: 2024-05-20

## 2024-05-20 RX ORDER — DICLOFENAC SODIUM 75 MG/1
75 TABLET, DELAYED RELEASE ORAL 2 TIMES DAILY WITH MEALS
Qty: 180 TABLET | Refills: 3 | Status: SHIPPED | OUTPATIENT
Start: 2024-05-20

## 2024-05-20 RX ORDER — AMLODIPINE BESYLATE 10 MG/1
10 TABLET ORAL DAILY
Qty: 90 TABLET | Refills: 3 | Status: SHIPPED | OUTPATIENT
Start: 2024-05-20

## 2024-05-20 RX ORDER — FAMOTIDINE 20 MG/1
20 TABLET, FILM COATED ORAL
Qty: 90 TABLET | Refills: 3 | Status: SHIPPED | OUTPATIENT
Start: 2024-05-20

## 2024-05-20 RX ORDER — ATORVASTATIN CALCIUM 10 MG/1
10 TABLET, FILM COATED ORAL DAILY
Qty: 90 TABLET | Refills: 3 | Status: SHIPPED | OUTPATIENT
Start: 2024-05-20

## 2024-05-20 NOTE — PROGRESS NOTES
The ABCs of the Annual Wellness Visit  Subsequent Medicare Wellness Visit    Subjective    Andrea Diaz is a 68 y.o. male who presents for a Subsequent Medicare Wellness Visit.    The following portions of the patient's history were reviewed and updated as appropriate: allergies, current medications, past family history, past medical history, past social history, past surgical history, and problem list.    Compared to one year ago, the patient feels his physical health is the same.    Compared to one year ago, the patient feels his mental health is the same.    Recent Hospitalizations:  He was not admitted to the hospital during the last year.     Current Medical Providers:  Patient Care Team:  Velasquez Daniel MD as PCP - General (Family Medicine)    Outpatient Medications Prior to Visit   Medication Sig Dispense Refill    glucosamine sulfate 500 MG capsule capsule Take  by mouth 2 (Two) Times a Day With Meals.      amLODIPine (NORVASC) 10 MG tablet Take 1 tablet by mouth Daily. 90 tablet 3    atorvastatin (LIPITOR) 10 MG tablet Take 1 tablet by mouth Daily. 90 tablet 3    diclofenac (VOLTAREN) 75 MG EC tablet Take 1 tablet by mouth 2 (Two) Times a Day With Meals. 180 tablet 3    famotidine (PEPCID) 20 MG tablet Take 1 tablet by mouth every night at bedtime. 90 tablet 3    hydroCHLOROthiazide (HYDRODIURIL) 25 MG tablet Take 1 tablet by mouth Daily. 90 tablet 3     No facility-administered medications prior to visit.       No opioid medication identified on active medication list. I have reviewed chart for other potential  high risk medication/s and harmful drug interactions in the elderly.      Aspirin is not on active medication list.  Aspirin use is not indicated based on review of current medical condition/s. Risk of harm outweighs potential benefits.  .    Patient Active Problem List   Diagnosis    Family history of colon cancer    Screening for colon cancer    Type 2 diabetes mellitus without  "complication, without long-term current use of insulin    Pure hypercholesterolemia    Primary generalized (osteo)arthritis    Essential hypertension    Mixed hyperlipidemia    Morbid obesity     Advance Care Planning  Advance Directive is not on file.  ACP discussion was held with the patient during this visit. Patient does not have an advance directive, information provided.  His daughters would make decisions if needed.     Objective    Vitals:    24 0929   BP: 148/75   BP Location: Left arm   Patient Position: Sitting   Pulse: 92   Temp: 98 °F (36.7 °C)   TempSrc: Oral   SpO2: 95%   Weight: 128 kg (282 lb 6.4 oz)   Height: 171.5 cm (67.52\")   PainSc: 0-No pain     Estimated body mass index is 43.55 kg/m² as calculated from the following:    Height as of this encounter: 171.5 cm (67.52\").    Weight as of this encounter: 128 kg (282 lb 6.4 oz).    Class 3 Severe Obesity (BMI >=40). Obesity-related health conditions include the following: hypertension, diabetes mellitus, and dyslipidemias. Obesity is worsening. BMI is is above average; BMI management plan is completed. We discussed portion control, increasing exercise, and pharmacologic options including GLP1 agonist .    Does the patient have evidence of cognitive impairment? No        HEALTH RISK ASSESSMENT    Smoking Status:  Social History     Tobacco Use   Smoking Status Never    Passive exposure: Past   Smokeless Tobacco Never     Alcohol Consumption:  Social History     Substance and Sexual Activity   Alcohol Use Not Currently     Fall Risk Screen:    MARCOADI Fall Risk Assessment was completed, and patient is at LOW risk for falls.Assessment completed on:2024    Depression Screenin/20/2024     9:27 AM   PHQ-2/PHQ-9 Depression Screening   Little Interest or Pleasure in Doing Things 0-->not at all   Feeling Down, Depressed or Hopeless 0-->not at all   PHQ-9: Brief Depression Severity Measure Score 0       Health Habits and Functional and " Cognitive Screenin/20/2024     9:27 AM   Functional & Cognitive Status   Do you have difficulty preparing food and eating? No   Do you have difficulty bathing yourself, getting dressed or grooming yourself? No   Do you have difficulty using the toilet? No   Do you have difficulty moving around from place to place? No   Do you have trouble with steps or getting out of a bed or a chair? No   Current Diet Unhealthy Diet   Dental Exam Up to date   Eye Exam Up to date   Exercise (times per week) 0 times per week   Current Exercises Include No Regular Exercise   Do you need help using the phone?  No   Are you deaf or do you have serious difficulty hearing?  No   Do you need help to go to places out of walking distance? No   Do you need help shopping? No   Do you need help preparing meals?  No   Do you need help with housework?  No   Do you need help with laundry? No   Do you need help taking your medications? No   Do you need help managing money? No   Do you ever drive or ride in a car without wearing a seat belt? No   Have you felt unusual stress, anger or loneliness in the last month? No   Who do you live with? Alone   If you need help, do you have trouble finding someone available to you? No   Have you been bothered in the last four weeks by sexual problems? No   Do you have difficulty concentrating, remembering or making decisions? No       Age-appropriate Screening Schedule:  Refer to the list below for future screening recommendations based on patient's age, sex and/or medical conditions. Orders for these recommended tests are listed in the plan section. The patient has been provided with a written plan.    Health Maintenance   Topic Date Due    TDAP/TD VACCINES (1 - Tdap) Never done    RSV Vaccine - Adults (1 - 1-dose 60+ series) Never done    HEMOGLOBIN A1C  2024    LIPID PANEL  2024    URINE MICROALBUMIN  2024    BMI FOLLOWUP  2024    COVID-19 Vaccine (3 - - season)  11/13/2024 (Originally 9/1/2023)    INFLUENZA VACCINE  08/01/2024    DIABETIC EYE EXAM  10/19/2024    ANNUAL WELLNESS VISIT  05/20/2025    DIABETIC FOOT EXAM  05/20/2025    COLORECTAL CANCER SCREENING  11/20/2027    HEPATITIS C SCREENING  Completed    Pneumococcal Vaccine 65+  Completed    ZOSTER VACCINE  Completed          CMS Preventative Services Quick Reference  Risk Factors Identified During Encounter  Chronic Pain: NSAIDs per medication orders.  Immunizations Discussed/Encouraged: Tdap and RSV (Respiratory Syncytial Virus)  The above risks/problems have been discussed with the patient.  Pertinent information has been shared with the patient in the After Visit Summary.  An After Visit Summary and PPPS were made available to the patient.    Follow Up:   Next Medicare Wellness visit to be scheduled in 1 year.     Additional E&M Note during same encounter follows:  Patient has multiple medical problems which are significant and separately identifiable that require additional work above and beyond the Medicare Wellness Visit.      Chief Complaint:  Diabetes, blood pressure, cholesterol, arthritis    Subjective    History of Present Illness:  In addition to the Medicare wellness exam, Marcelino is also here for follow-up on his usual care.  He has type 2 diabetes for which she is currently on no medication.  He does not check his blood sugars routinely.  His post recent A1c was 6.5%.     He also has hypertension and elevated cholesterol for which he is on treatments as noted.  His blood pressure is mildly elevated this morning.  He does not check his blood pressure on a regular basis at home.     He also has osteoarthritis of his hands in particular for which he remains on diclofenac.  He seems to tolerate it fairly well.  Risks are again discussed.    Review of Systems:  Review of Systems   Constitutional:  Negative for chills and fever.   Respiratory:  Negative for cough and shortness of breath.    Cardiovascular:   "Negative for chest pain and palpitations.   Gastrointestinal:  Negative for abdominal pain, nausea and vomiting.      Objective   Vital Signs:  /75 (BP Location: Left arm, Patient Position: Sitting)   Pulse 92   Temp 98 °F (36.7 °C) (Oral)   Ht 171.5 cm (67.52\")   Wt 128 kg (282 lb 6.4 oz)   SpO2 95%   BMI 43.55 kg/m²     Physical Exam  Vitals and nursing note reviewed.   Constitutional:       General: He is not in acute distress.     Appearance: He is not ill-appearing.   HENT:      Right Ear: Tympanic membrane and ear canal normal.      Left Ear: Tympanic membrane and ear canal normal.      Ears:      Comments: Hearing is normal with forced whisper bilaterally     Mouth/Throat:      Mouth: Mucous membranes are moist.      Comments: Pharynx appears normal  Eyes:      Extraocular Movements: Extraocular movements intact.      Pupils: Pupils are equal, round, and reactive to light.      Comments: Binocular vision is 20/20 with correction   Neck:      Thyroid: No thyromegaly.   Cardiovascular:      Rate and Rhythm: Normal rate and regular rhythm.      Pulses:           Dorsalis pedis pulses are 2+ on the right side and 2+ on the left side.      Heart sounds: No murmur heard.  Pulmonary:      Effort: Pulmonary effort is normal.      Breath sounds: Normal breath sounds.   Abdominal:      General: There is no distension.      Palpations: Abdomen is soft. There is no mass.      Tenderness: There is no abdominal tenderness.   Musculoskeletal:      Cervical back: Normal range of motion.   Feet:      Right foot:      Protective Sensation: 3 sites tested.  3 sites sensed.      Skin integrity: Skin integrity normal.      Toenail Condition: Right toenails are abnormally thick.      Left foot:      Protective Sensation: 3 sites tested.  3 sites sensed.      Skin integrity: Skin integrity normal.      Toenail Condition: Left toenails are abnormally thick.   Skin:     Findings: No lesion or rash.   Neurological:      " General: No focal deficit present.      Mental Status: He is oriented to person, place, and time.      Cranial Nerves: No cranial nerve deficit.   Psychiatric:         Mood and Affect: Mood normal.       The following data was reviewed by Velasquez Daniel MD on 05/20/2024.  Lab Results   Component Value Date    WBC 5.40 11/14/2023    HGB 15.0 11/14/2023    HCT 43.3 11/14/2023    MCV 87.8 11/14/2023     11/14/2023     Lab Results   Component Value Date    GLUCOSE 144 (H) 11/14/2023    BUN 20 11/14/2023    CREATININE 1.16 11/14/2023     11/14/2023    K 4.3 11/14/2023     11/14/2023    CO2 28.0 11/14/2023    CALCIUM 9.9 11/14/2023    PROTEINTOT 7.5 11/14/2023    ALBUMIN 4.6 11/14/2023    ALT 30 11/14/2023    AST 22 11/14/2023    ALKPHOS 75 11/14/2023    BILITOT 0.6 11/14/2023    EGFR 67.8 11/14/2023    GLOB 2.9 11/14/2023    AGRATIO 1.6 11/14/2023    BCR 17.2 11/14/2023    ANIONGAP 10.0 11/14/2023      Lab Results   Component Value Date    CHOL 127 05/16/2023    CHLPL 136 01/05/2021    TRIG 110 05/16/2023    HDL 34 (L) 05/16/2023    LDL 73 05/16/2023     Lab Results   Component Value Date    TSH 1.830 11/14/2023     Lab Results   Component Value Date    HGBA1C 6.50 (H) 11/14/2023     Lab Results   Component Value Date    PSA 0.518 11/14/2023    PSA 0.615 11/14/2022    PSA 0.487 07/30/2021             Assessment and Plan:   Today, we have reviewed his care.  Overall, Marcelino seems well.  Regarding the Medicare wellness, we will plan to reach out to him in October to consider repeat colonoscopy.  He is up-to-date on PSA.  We also reviewed vaccines as above.    Regarding his usual care, his blood pressure is mildly elevated, and we will recheck it before he leaves.  Otherwise, there is no immediate change in care that I would recommend.  Tentative follow-up will be again in about 6 months, sooner if needed.    Diagnoses and all orders for this visit:    1. Physical exam (Primary)    2. Type 2  diabetes mellitus without complication, without long-term current use of insulin  -     MicroAlbumin, Urine, Random - Urine, Clean Catch; Future  -     Hemoglobin A1c; Future  -     Comprehensive Metabolic Panel; Future    3. Essential hypertension  -     Comprehensive Metabolic Panel; Future  -     amLODIPine (NORVASC) 10 MG tablet; Take 1 tablet by mouth Daily.  Dispense: 90 tablet; Refill: 3  -     hydroCHLOROthiazide 25 MG tablet; Take 1 tablet by mouth Daily.  Dispense: 90 tablet; Refill: 3    4. Mixed hyperlipidemia  -     Lipid Panel; Future  -     Comprehensive Metabolic Panel; Future  -     atorvastatin (LIPITOR) 10 MG tablet; Take 1 tablet by mouth Daily.  Dispense: 90 tablet; Refill: 3    5. Morbid obesity  -     Hemoglobin A1c; Future    6. Primary generalized (osteo)arthritis  -     diclofenac (VOLTAREN) 75 MG EC tablet; Take 1 tablet by mouth 2 (Two) Times a Day With Meals.  Dispense: 180 tablet; Refill: 3    7. Other long term (current) drug therapy  -     CBC (No Diff); Future    Other orders  -     famotidine (PEPCID) 20 MG tablet; Take 1 tablet by mouth every night at bedtime.  Dispense: 90 tablet; Refill: 3       Follow Up  Return in about 6 months (around 11/20/2024) for Recheck.  Patient was given instructions and counseling regarding his condition or for health maintenance advice. Please see specific information pulled into the AVS if appropriate.

## 2024-05-21 DIAGNOSIS — E11.9 TYPE 2 DIABETES MELLITUS WITHOUT COMPLICATION, WITHOUT LONG-TERM CURRENT USE OF INSULIN: Primary | ICD-10-CM

## 2024-05-21 RX ORDER — LANCETS
EACH MISCELLANEOUS
Qty: 100 EACH | Refills: 3 | Status: SHIPPED | OUTPATIENT
Start: 2024-05-21

## 2024-05-21 RX ORDER — BLOOD-GLUCOSE METER
KIT MISCELLANEOUS
Qty: 1 EACH | Refills: 0 | Status: SHIPPED | OUTPATIENT
Start: 2024-05-21

## 2024-05-24 ENCOUNTER — TELEPHONE (OUTPATIENT)
Dept: FAMILY MEDICINE CLINIC | Age: 69
End: 2024-05-24
Payer: MEDICARE

## 2024-05-24 NOTE — TELEPHONE ENCOUNTER
"  Caller: Andrea Diaz \"Marcelino\"    Relationship: Self    Best call back number: [phone]     Who are you requesting to speak with (clinical staff, provider,  specific staff member): SAMMY    What was the call regarding: PATIENT STATES THAT HE NEEDS TO SPEAK WITH SAMMY ABOUT GETTING MORE BLOODWORK DONE AND ABOUT HIS A1C BEING HIGH. HE STATES THAT THEY HAVE ALREADY DISCUSSED THIS THIS WEEK.       "

## 2024-05-31 ENCOUNTER — TELEPHONE (OUTPATIENT)
Dept: FAMILY MEDICINE CLINIC | Age: 69
End: 2024-05-31
Payer: MEDICARE

## 2024-05-31 NOTE — TELEPHONE ENCOUNTER
Pt states he just started checking a couple days ago and fasting sugars running 140-176. He is trying to work on his diet.

## 2024-06-14 ENCOUNTER — TELEPHONE (OUTPATIENT)
Dept: FAMILY MEDICINE CLINIC | Age: 69
End: 2024-06-14
Payer: MEDICARE

## 2024-06-14 NOTE — TELEPHONE ENCOUNTER
----- Message from Meghan NO sent at 5/31/2024  3:13 PM EDT -----  How are fasting sugars running?

## 2024-06-15 NOTE — TELEPHONE ENCOUNTER
Noted.  His blood sugars are running above goal, and I would recommend moving ahead with some treatment for type 2 diabetes.  Because of obesity, I would lean toward starting a GLP-1 agonist.  These medications can help with controlling the glucose, but they also have significant weight loss benefit for most patients.    Confirm that he has no family history of multiple endocrine neoplasia or medullary thyroid carcinoma.  Confirm that he has no personal history of pancreatitis.  Please review usual GLP-1 agonist precautions.  There would be a couple of different options.  We could try to move forward with one of the weekly injections like Ozempic or Mounjaro.  The other option would the tablet version of Ozempic which is called Rybelsus.  Please see what his thoughts are, and let me know.  Thanks.

## 2024-06-17 NOTE — TELEPHONE ENCOUNTER
Pt declines medication at this time, states he will continue to work on diet. He has lost some weight over the last two weeks.

## 2024-07-25 ENCOUNTER — READMISSION MANAGEMENT (OUTPATIENT)
Dept: CALL CENTER | Facility: HOSPITAL | Age: 69
End: 2024-07-25
Payer: MEDICARE

## 2024-07-25 NOTE — OUTREACH NOTE
Prep Survey      Flowsheet Row Responses   [facility] facility patient discharged from? Non-BH   Is LACE score < 7 ? Non-BH Discharge   Eligibility [facility] - Inpatient   Date of Discharge 07/25/24   Discharge Disposition Home-Health Care [facility] [place]   Discharge diagnosis Unavailable   Does the patient have one of the following disease processes/diagnoses(primary or secondary)? Other   Does the patient have Home health ordered? Yes   What is the Home health agency?  Home - with Home Health Services   Prep survey completed? Yes            Krupa ULLOA - Registered Nurse

## 2024-07-26 ENCOUNTER — TRANSITIONAL CARE MANAGEMENT TELEPHONE ENCOUNTER (OUTPATIENT)
Dept: CALL CENTER | Facility: HOSPITAL | Age: 69
End: 2024-07-26
Payer: MEDICARE

## 2024-07-26 NOTE — OUTREACH NOTE
Call Center TCM Note      Flowsheet Row Responses   [facility] patient discharged from? Non-  [[facility]]   Does the patient have one of the following disease processes/diagnoses(primary or secondary)? Other   TCM attempt successful? Yes   Call start time 1310   Call end time 1312   Discharge diagnosis Head Injury from a fall   Is patient permission given to speak with other caregiver? Yes   List who call center can speak with daughter- Anna   Person spoke with today (if not patient) and relationship daughter   Does the patient have all medications ordered at discharge? Yes   Is the patient taking all medications as directed (includes completed medication regime)? Yes   Comments Hospital follow up appt with PCP Dr. Daniel on 8/6   Does the patient have an appointment with their PCP within 7-14 days of discharge? Yes   What is the Home health agency?  Home - with Home Health Services   Has home health visited the patient within 72 hours of discharge? Yes   Psychosocial issues? No   Did the patient receive a copy of their discharge instructions? Yes   What is the patient's perception of their health status since discharge? Improving   Is the patient/caregiver able to teach back signs and symptoms related to disease process for when to call PCP? Yes   Is the patient/caregiver able to teach back signs and symptoms related to disease process for when to call 911? Yes   Is the patient/caregiver able to teach back the hierarchy of who to call/visit for symptoms/problems? PCP, Specialist, Home health nurse, Urgent Care, ED, 911 Yes   TCM call completed? Yes   Wrap up additional comments Per daughter, patient is doing well, all concerns addressed, confirmed hospital follow up appt with PCP for 8/6.   Call end time 1312   Would this patient benefit from a Referral to Amb Social Work? No   Is the patient interested in additional calls from an ambulatory ? No            Cindy Bernardo,  RN    7/26/2024, 13:13 EDT

## 2024-08-05 ENCOUNTER — TELEPHONE (OUTPATIENT)
Dept: FAMILY MEDICINE CLINIC | Age: 69
End: 2024-08-05
Payer: MEDICARE

## 2024-08-05 NOTE — TELEPHONE ENCOUNTER
Dr. Torres reached out to me in his role as Marcelino's son-in-law.  He wanted to let me know that there has been some change in behavior with Marcelino since the traumatic brain injury.  He seems to be more easily agitated and having mood swings.  This is partially related to not being able to drive currently.  He suggested that we consider some type of mood stabilizer to see if it would be of benefit.  I did not reveal any private information to Dr. Torres while on the telephone.  Thanks.

## 2024-08-06 ENCOUNTER — LAB (OUTPATIENT)
Dept: LAB | Facility: HOSPITAL | Age: 69
End: 2024-08-06
Payer: MEDICARE

## 2024-08-06 ENCOUNTER — OFFICE VISIT (OUTPATIENT)
Dept: FAMILY MEDICINE CLINIC | Age: 69
End: 2024-08-06
Payer: MEDICARE

## 2024-08-06 VITALS
BODY MASS INDEX: 38.8 KG/M2 | HEIGHT: 68 IN | TEMPERATURE: 97.6 F | HEART RATE: 91 BPM | WEIGHT: 256 LBS | OXYGEN SATURATION: 94 % | SYSTOLIC BLOOD PRESSURE: 131 MMHG | DIASTOLIC BLOOD PRESSURE: 76 MMHG

## 2024-08-06 DIAGNOSIS — R45.86 MOOD SWINGS: ICD-10-CM

## 2024-08-06 DIAGNOSIS — E78.2 MIXED HYPERLIPIDEMIA: ICD-10-CM

## 2024-08-06 DIAGNOSIS — E11.9 TYPE 2 DIABETES MELLITUS WITHOUT COMPLICATION, WITHOUT LONG-TERM CURRENT USE OF INSULIN: ICD-10-CM

## 2024-08-06 DIAGNOSIS — Z79.899 OTHER LONG TERM (CURRENT) DRUG THERAPY: ICD-10-CM

## 2024-08-06 DIAGNOSIS — S09.90XD INJURY OF HEAD, SUBSEQUENT ENCOUNTER: Primary | ICD-10-CM

## 2024-08-06 DIAGNOSIS — I10 ESSENTIAL HYPERTENSION: ICD-10-CM

## 2024-08-06 DIAGNOSIS — N30.00 ACUTE CYSTITIS WITHOUT HEMATURIA: ICD-10-CM

## 2024-08-06 DIAGNOSIS — M15.0 PRIMARY GENERALIZED (OSTEO)ARTHRITIS: ICD-10-CM

## 2024-08-06 DIAGNOSIS — N30.00 ACUTE CYSTITIS WITHOUT HEMATURIA: Primary | ICD-10-CM

## 2024-08-06 LAB
ANION GAP SERPL CALCULATED.3IONS-SCNC: 12.4 MMOL/L (ref 5–15)
BACTERIA UR QL AUTO: ABNORMAL /HPF
BILIRUB UR QL STRIP: NEGATIVE
BUN SERPL-MCNC: 15 MG/DL (ref 8–23)
BUN/CREAT SERPL: 16 (ref 7–25)
CALCIUM SPEC-SCNC: 9.1 MG/DL (ref 8.6–10.5)
CHLORIDE SERPL-SCNC: 104 MMOL/L (ref 98–107)
CLARITY UR: ABNORMAL
CO2 SERPL-SCNC: 23.6 MMOL/L (ref 22–29)
COD CRY URNS QL: PRESENT /HPF
COLOR UR: ABNORMAL
CREAT SERPL-MCNC: 0.94 MG/DL (ref 0.76–1.27)
DEPRECATED RDW RBC AUTO: 40.6 FL (ref 37–54)
EGFRCR SERPLBLD CKD-EPI 2021: 87.2 ML/MIN/1.73
ERYTHROCYTE [DISTWIDTH] IN BLOOD BY AUTOMATED COUNT: 12.5 % (ref 12.3–15.4)
GLUCOSE SERPL-MCNC: 148 MG/DL (ref 65–99)
GLUCOSE UR STRIP-MCNC: NEGATIVE MG/DL
HBA1C MFR BLD: 7 % (ref 4.8–5.6)
HCT VFR BLD AUTO: 38.7 % (ref 37.5–51)
HGB BLD-MCNC: 12.8 G/DL (ref 13–17.7)
HGB UR QL STRIP.AUTO: ABNORMAL
HYALINE CASTS UR QL AUTO: ABNORMAL /LPF
KETONES UR QL STRIP: NEGATIVE
LEUKOCYTE ESTERASE UR QL STRIP.AUTO: ABNORMAL
MCH RBC QN AUTO: 30.1 PG (ref 26.6–33)
MCHC RBC AUTO-ENTMCNC: 33.1 G/DL (ref 31.5–35.7)
MCV RBC AUTO: 91.1 FL (ref 79–97)
NITRITE UR QL STRIP: NEGATIVE
PH UR STRIP.AUTO: 5.5 [PH] (ref 5–8)
PLATELET # BLD AUTO: 245 10*3/MM3 (ref 140–450)
PMV BLD AUTO: 11.2 FL (ref 6–12)
POTASSIUM SERPL-SCNC: 3.5 MMOL/L (ref 3.5–5.2)
PROT UR QL STRIP: ABNORMAL
RBC # BLD AUTO: 4.25 10*6/MM3 (ref 4.14–5.8)
RBC # UR STRIP: ABNORMAL /HPF
REF LAB TEST METHOD: ABNORMAL
SODIUM SERPL-SCNC: 140 MMOL/L (ref 136–145)
SP GR UR STRIP: 1.02 (ref 1–1.03)
SQUAMOUS #/AREA URNS HPF: ABNORMAL /HPF
UROBILINOGEN UR QL STRIP: ABNORMAL
WBC # UR STRIP: ABNORMAL /HPF
WBC NRBC COR # BLD AUTO: 7.07 10*3/MM3 (ref 3.4–10.8)

## 2024-08-06 PROCEDURE — 87186 SC STD MICRODIL/AGAR DIL: CPT | Performed by: FAMILY MEDICINE

## 2024-08-06 PROCEDURE — 81001 URINALYSIS AUTO W/SCOPE: CPT

## 2024-08-06 PROCEDURE — 83036 HEMOGLOBIN GLYCOSYLATED A1C: CPT

## 2024-08-06 PROCEDURE — 1159F MED LIST DOCD IN RCRD: CPT | Performed by: FAMILY MEDICINE

## 2024-08-06 PROCEDURE — 85027 COMPLETE CBC AUTOMATED: CPT

## 2024-08-06 PROCEDURE — 99495 TRANSJ CARE MGMT MOD F2F 14D: CPT | Performed by: FAMILY MEDICINE

## 2024-08-06 PROCEDURE — 3052F HG A1C>EQUAL 8.0%<EQUAL 9.0%: CPT | Performed by: FAMILY MEDICINE

## 2024-08-06 PROCEDURE — 1160F RVW MEDS BY RX/DR IN RCRD: CPT | Performed by: FAMILY MEDICINE

## 2024-08-06 PROCEDURE — 1111F DSCHRG MED/CURRENT MED MERGE: CPT | Performed by: FAMILY MEDICINE

## 2024-08-06 PROCEDURE — 80048 BASIC METABOLIC PNL TOTAL CA: CPT

## 2024-08-06 PROCEDURE — 87086 URINE CULTURE/COLONY COUNT: CPT | Performed by: FAMILY MEDICINE

## 2024-08-06 PROCEDURE — 3078F DIAST BP <80 MM HG: CPT | Performed by: FAMILY MEDICINE

## 2024-08-06 PROCEDURE — 1126F AMNT PAIN NOTED NONE PRSNT: CPT | Performed by: FAMILY MEDICINE

## 2024-08-06 PROCEDURE — 3075F SYST BP GE 130 - 139MM HG: CPT | Performed by: FAMILY MEDICINE

## 2024-08-06 PROCEDURE — 87088 URINE BACTERIA CULTURE: CPT | Performed by: FAMILY MEDICINE

## 2024-08-06 PROCEDURE — 36415 COLL VENOUS BLD VENIPUNCTURE: CPT

## 2024-08-06 RX ORDER — DICLOFENAC SODIUM 75 MG/1
75 TABLET, DELAYED RELEASE ORAL 2 TIMES DAILY WITH MEALS
Qty: 180 TABLET | Refills: 3 | Status: SHIPPED | OUTPATIENT
Start: 2024-08-06

## 2024-08-06 RX ORDER — FAMOTIDINE 20 MG/1
20 TABLET, FILM COATED ORAL
Qty: 90 TABLET | Refills: 3 | Status: SHIPPED | OUTPATIENT
Start: 2024-08-06

## 2024-08-06 RX ORDER — ONDANSETRON 4 MG/1
4 TABLET, FILM COATED ORAL EVERY 8 HOURS PRN
COMMUNITY
Start: 2024-07-24

## 2024-08-06 RX ORDER — DONEPEZIL HYDROCHLORIDE 10 MG/1
10 TABLET, FILM COATED ORAL NIGHTLY
COMMUNITY
Start: 2024-07-24

## 2024-08-06 RX ORDER — CARVEDILOL 3.12 MG/1
3.12 TABLET ORAL 2 TIMES DAILY WITH MEALS
COMMUNITY
Start: 2024-07-12 | End: 2024-08-06 | Stop reason: SDUPTHER

## 2024-08-06 RX ORDER — AMLODIPINE BESYLATE 10 MG/1
10 TABLET ORAL DAILY
Qty: 90 TABLET | Refills: 3 | Status: SHIPPED | OUTPATIENT
Start: 2024-08-06

## 2024-08-06 RX ORDER — ATORVASTATIN CALCIUM 10 MG/1
10 TABLET, FILM COATED ORAL DAILY
Qty: 90 TABLET | Refills: 3 | Status: SHIPPED | OUTPATIENT
Start: 2024-08-06

## 2024-08-06 RX ORDER — VENLAFAXINE HYDROCHLORIDE 37.5 MG/1
37.5 CAPSULE, EXTENDED RELEASE ORAL DAILY
Qty: 30 CAPSULE | Refills: 1 | Status: SHIPPED | OUTPATIENT
Start: 2024-08-06

## 2024-08-06 RX ORDER — BLOOD-GLUCOSE METER
EACH MISCELLANEOUS SEE ADMIN INSTRUCTIONS
COMMUNITY
Start: 2024-05-21

## 2024-08-06 RX ORDER — CARVEDILOL 3.12 MG/1
3.12 TABLET ORAL 2 TIMES DAILY WITH MEALS
Qty: 180 TABLET | Refills: 3 | Status: SHIPPED | OUTPATIENT
Start: 2024-08-06

## 2024-08-06 NOTE — PROGRESS NOTES
"Andrea Diaz presents to [facility].    Chief Complaint:  Follow up on head injury    Transitional Care Management:  -Marcelino was admitted to [facility] on 7/12/24 with discharge on 7/25/24.    -While inpatient, he was cared for by the staff physician - Dr. Zambrano.  -Staff from [facility] reach out to him within 48 hours of hospital discharge arrange follow-up.  -His problems and medications have been reviewed and reconciled.    Subjective   History of Present Illness:  Gt is being seen today for follow-up on his care.  He suffered a head injury on 7/1/2024.  That occurred while hiking.  He had subarachnoid hemorrhage as a result of the injury and was admitted to [facility].  He was apparently there for about 11 days and then transferred to [facility] for ongoing rehabilitative care.  He has been back home now for about 11 days.  He is being seen by home health currently as he adjusts to being back home.    Since getting home, he has an occasional ache, but he denies significant headache at this time.  He has been able to manage most of his affairs since getting home.  His daughter says that he is able to pay bills and take care of the \"usual things\".  He is not driving yet.  He is supposed to follow-up with neurology again in about 2 weeks.    Review of Systems:  Review of Systems   Constitutional:  Negative for chills and fever.   Respiratory:  Negative for cough and shortness of breath.    Cardiovascular:  Negative for chest pain and palpitations.   Gastrointestinal:  Negative for abdominal pain, nausea and vomiting.      Objective   Medical History:  Past Medical History:    Essential (primary) hypertension    Other long term (current) drug therapy    Primary generalized (osteo)arthritis    Pure hypercholesterolemia    Type 2 diabetes mellitus without complication    Unilateral primary osteoarthritis, left knee     Past " Surgical History:    COLONOSCOPY    Hyperplastic polyp, hemorrhoids    JOINT REPLACEMENT    knee    KNEE ARTHROPLASTY    KNEE SURGERY    meniscus      Family History   Problem Relation Age of Onset    Coronary artery disease Mother     Pancreatic cancer Father     Diabetes type II Father     Colon cancer Other      Social History     Tobacco Use    Smoking status: Never     Passive exposure: Past    Smokeless tobacco: Never   Substance Use Topics    Alcohol use: Not Currently       Health Maintenance Due   Topic Date Due    INFLUENZA VACCINE  08/01/2024        Immunization History   Administered Date(s) Administered    Arexvy (RSV, Adults 60+ yrs) 05/20/2024    COVID-19 (PFIZER) Purple Cap Monovalent 03/17/2021, 04/07/2021    Fluzone High Dose =>65 Years ([employer] ONLY) 11/25/2022    Fluzone High-Dose 65+yrs 11/14/2023    Hepatitis A 06/09/2018, 06/11/2018, 01/02/2019    Influenza, Unspecified 01/05/2021    Pneumococcal Conjugate 13-Valent (PCV13) 10/26/2018    Pneumococcal Polysaccharide (PPSV23) 10/01/2019    Shingrix 06/09/2018, 08/29/2018    Tdap 05/20/2024    Zostavax 10/09/2013       Allergies   Allergen Reactions    Metformin GI Intolerance        Medications:  Current Outpatient Medications on File Prior to Visit   Medication Sig    Blood Glucose Monitoring Suppl (OneTouch Verio Flex System) w/Device kit See Admin Instructions.    glucosamine sulfate 500 MG capsule capsule Take  by mouth 2 (Two) Times a Day With Meals.    glucose blood test strip Check blood sugar once daily  DX E11.9    glucose monitor monitoring kit Check blood sugar once daily  DX E11.9    Lancets Thin misc Check blood sugar once daily  DX E11.9    ondansetron (ZOFRAN) 4 MG tablet Take 1 tablet by mouth Every 8 (Eight) Hours As Needed.    [DISCONTINUED] amLODIPine (NORVASC) 10 MG tablet Take 1 tablet by mouth Daily.    [DISCONTINUED] atorvastatin (LIPITOR) 10 MG tablet Take 1 tablet by mouth Daily.    [DISCONTINUED] carvedilol (COREG)  "3.125 MG tablet Take 1 tablet by mouth 2 (Two) Times a Day With Meals.    [DISCONTINUED] diclofenac (VOLTAREN) 75 MG EC tablet Take 1 tablet by mouth 2 (Two) Times a Day With Meals.    [DISCONTINUED] famotidine (PEPCID) 20 MG tablet Take 1 tablet by mouth every night at bedtime.    donepezil (ARICEPT) 10 MG tablet Take 1 tablet by mouth Every Night.    [DISCONTINUED] hydroCHLOROthiazide 25 MG tablet Take 1 tablet by mouth Daily. (Patient not taking: Reported on 8/6/2024)     No current facility-administered medications on file prior to visit.       Vital Signs:   /76 (BP Location: Right arm, Patient Position: Sitting)   Pulse 91   Temp 97.6 °F (36.4 °C) (Oral)   Ht 171.5 cm (67.52\")   Wt 116 kg (256 lb)   SpO2 94%   BMI 39.48 kg/m²       Physical Exam:  Physical Exam  Vitals and nursing note reviewed.   Constitutional:       General: He is not in acute distress.     Appearance: He is obese. He is not ill-appearing.   HENT:      Right Ear: Tympanic membrane and ear canal normal.      Left Ear: Tympanic membrane and ear canal normal.      Mouth/Throat:      Mouth: Mucous membranes are moist.      Comments: Pharynx appears normal  Eyes:      Extraocular Movements: Extraocular movements intact.      Pupils: Pupils are equal, round, and reactive to light.   Neck:      Thyroid: No thyromegaly.   Cardiovascular:      Rate and Rhythm: Normal rate and regular rhythm.      Heart sounds: No murmur heard.  Pulmonary:      Effort: Pulmonary effort is normal.      Breath sounds: Normal breath sounds.   Abdominal:      General: There is no distension.      Palpations: Abdomen is soft. There is no mass.      Tenderness: There is no abdominal tenderness.   Musculoskeletal:      Cervical back: Normal range of motion.      Right lower leg: Edema present.      Left lower leg: Edema present.   Skin:     Findings: No lesion or rash.   Neurological:      General: No focal deficit present.      Mental Status: He is oriented to " person, place, and time.      Cranial Nerves: No cranial nerve deficit.      Motor: No weakness.   Psychiatric:         Mood and Affect: Mood normal.         Result Review   The following data was reviewed by Velasquez Daniel MD on 08/06/2024.  Lab Results   Component Value Date    WBC 6.76 07/12/2024    HGB 12.4 (L) 07/12/2024    HCT 35.1 (L) 07/12/2024    MCV 88 07/12/2024     07/12/2024     Lab Results   Component Value Date    GLUCOSE 176 (H) 05/20/2024    BUN 20 05/20/2024    CREATININE 1.02 05/20/2024     (L) 07/11/2024    K 4.0 05/20/2024     05/20/2024    CO2 23.0 05/20/2024    CALCIUM 8.8 05/20/2024    PROTEINTOT 7.0 05/20/2024    ALBUMIN 4.3 05/20/2024    ALT 29 05/20/2024    AST 13 05/20/2024    ALKPHOS 77 05/20/2024    BILITOT 0.4 05/20/2024    EGFR 79.1 05/20/2024    GLOB 2.7 05/20/2024    AGRATIO 1.6 05/20/2024    BCR 19.6 05/20/2024    ANIONGAP 9.0 05/20/2024      Lab Results   Component Value Date    CHOL 125 05/20/2024    CHLPL 136 01/05/2021    TRIG 113 05/20/2024    HDL 35 (L) 05/20/2024    LDL 69 05/20/2024     Lab Results   Component Value Date    TSH 1.830 11/14/2023     Lab Results   Component Value Date    HGBA1C 8.40 (H) 05/20/2024     Lab Results   Component Value Date    PSA 0.518 11/14/2023    PSA 0.615 11/14/2022    PSA 0.487 07/30/2021          Assessment and Plan:   Today, we have reviewed his care.  Overall, Marcelino seems well today.  There is not any focal finding that is evident.  There is an ongoing concern regarding mood swings and some agitation.  I have recommended a trial of generic Effexor XR to see if that would be helpful.  Otherwise, we will refill his current medications as noted.  He is taking a sodium chloride tablet.  I may recommend stopping that depending on his lab results.  It also remains an open question in my mind how long to continue donepezil.  We will defer this to neurology follow-up.  Tentatively, we will plan to see him back in  November as scheduled, sooner if needed.    Diagnoses and all orders for this visit:    1. Injury of head, subsequent encounter (Primary)  Comments:  As above.    2. Mood swings  -     venlafaxine XR (Effexor XR) 37.5 MG 24 hr capsule; Take 1 capsule by mouth Daily.  Dispense: 30 capsule; Refill: 1    3. Essential hypertension  -     carvedilol (COREG) 3.125 MG tablet; Take 1 tablet by mouth 2 (Two) Times a Day With Meals.  Dispense: 180 tablet; Refill: 3  -     Basic Metabolic Panel; Future  -     amLODIPine (NORVASC) 10 MG tablet; Take 1 tablet by mouth Daily.  Dispense: 90 tablet; Refill: 3  -     Urinalysis With Microscopic - Urine, Clean Catch; Future    4. Mixed hyperlipidemia  -     atorvastatin (LIPITOR) 10 MG tablet; Take 1 tablet by mouth Daily.  Dispense: 90 tablet; Refill: 3    5. Primary generalized (osteo)arthritis  -     diclofenac (VOLTAREN) 75 MG EC tablet; Take 1 tablet by mouth 2 (Two) Times a Day With Meals.  Dispense: 180 tablet; Refill: 3    6. Type 2 diabetes mellitus without complication, without long-term current use of insulin  -     Basic Metabolic Panel; Future  -     Hemoglobin A1c; Future    7. Other long term (current) drug therapy  -     CBC (No Diff); Future    Other orders  -     famotidine (PEPCID) 20 MG tablet; Take 1 tablet by mouth every night at bedtime.  Dispense: 90 tablet; Refill: 3    Follow Up  Return in about 14 weeks (around 11/12/2024) for Recheck, Next scheduled follow up.  Patient was given instructions and counseling regarding his condition or for health maintenance advice. Please see specific information pulled into the AVS if appropriate.

## 2024-08-07 ENCOUNTER — OUTSIDE FACILITY SERVICE (OUTPATIENT)
Dept: FAMILY MEDICINE CLINIC | Age: 69
End: 2024-08-07
Payer: MEDICARE

## 2024-08-08 PROCEDURE — G0180 MD CERTIFICATION HHA PATIENT: HCPCS | Performed by: FAMILY MEDICINE

## 2024-08-08 RX ORDER — CEFDINIR 300 MG/1
300 CAPSULE ORAL 2 TIMES DAILY
Qty: 20 CAPSULE | Refills: 0 | Status: SHIPPED | OUTPATIENT
Start: 2024-08-08

## 2024-08-09 LAB — BACTERIA SPEC AEROBE CULT: ABNORMAL

## 2024-08-21 ENCOUNTER — TELEPHONE (OUTPATIENT)
Dept: FAMILY MEDICINE CLINIC | Age: 69
End: 2024-08-21
Payer: MEDICARE

## 2024-08-21 ENCOUNTER — LAB (OUTPATIENT)
Dept: LAB | Facility: HOSPITAL | Age: 69
End: 2024-08-21
Payer: MEDICARE

## 2024-08-21 DIAGNOSIS — I10 ESSENTIAL HYPERTENSION: Primary | ICD-10-CM

## 2024-08-21 DIAGNOSIS — E87.1 HYPONATREMIA: ICD-10-CM

## 2024-08-21 DIAGNOSIS — I10 ESSENTIAL HYPERTENSION: ICD-10-CM

## 2024-08-21 LAB
ANION GAP SERPL CALCULATED.3IONS-SCNC: 11.8 MMOL/L (ref 5–15)
BUN SERPL-MCNC: 13 MG/DL (ref 8–23)
BUN/CREAT SERPL: 11.7 (ref 7–25)
CALCIUM SPEC-SCNC: 9.2 MG/DL (ref 8.6–10.5)
CHLORIDE SERPL-SCNC: 103 MMOL/L (ref 98–107)
CO2 SERPL-SCNC: 24.2 MMOL/L (ref 22–29)
CREAT SERPL-MCNC: 1.11 MG/DL (ref 0.76–1.27)
EGFRCR SERPLBLD CKD-EPI 2021: 71 ML/MIN/1.73
GLUCOSE SERPL-MCNC: 229 MG/DL (ref 65–99)
POTASSIUM SERPL-SCNC: 3.7 MMOL/L (ref 3.5–5.2)
SODIUM SERPL-SCNC: 139 MMOL/L (ref 136–145)

## 2024-08-21 PROCEDURE — 80048 BASIC METABOLIC PNL TOTAL CA: CPT

## 2024-08-21 PROCEDURE — 36415 COLL VENOUS BLD VENIPUNCTURE: CPT

## 2024-08-21 NOTE — TELEPHONE ENCOUNTER
----- Message from Meghan NO sent at 8/7/2024  9:32 AM EDT -----  TICKLE for repeat BMP in 2 weeks for hyponatremia, hypertension

## 2024-10-01 ENCOUNTER — TELEPHONE (OUTPATIENT)
Dept: FAMILY MEDICINE CLINIC | Age: 69
End: 2024-10-01
Payer: MEDICARE

## 2024-10-01 DIAGNOSIS — Z80.0 FAMILY HX OF COLON CANCER: ICD-10-CM

## 2024-10-01 DIAGNOSIS — Z12.11 COLON CANCER SCREENING: Primary | ICD-10-CM

## 2024-10-01 NOTE — TELEPHONE ENCOUNTER
----- Message from Velasquez Daniel sent at 10/1/2024  7:16 AM EDT -----  Please call him and see if he would like to move ahead with a referral to Bridgette Jefferson MD in [place] for consideration of colonoscopy for colon cancer screening and family history of colon cancer.  Thanks.  ----- Message -----  From: Velasquez Daniel MD  Sent: 10/1/2024  12:00 AM EDT  To: Velasquez Daniel MD    Please call him and see if he would like to move ahead with a referral to Bridgette Jefferson MD in [place] for consideration of colonoscopy for colon cancer screening and family history of colon cancer.  Thanks.

## 2024-10-12 ENCOUNTER — DOCUMENTATION (OUTPATIENT)
Dept: FAMILY MEDICINE CLINIC | Age: 69
End: 2024-10-12
Payer: MEDICARE

## 2024-10-12 ENCOUNTER — APPOINTMENT (OUTPATIENT)
Dept: GENERAL RADIOLOGY | Facility: HOSPITAL | Age: 69
End: 2024-10-12
Payer: MEDICARE

## 2024-10-12 ENCOUNTER — HOSPITAL ENCOUNTER (INPATIENT)
Facility: HOSPITAL | Age: 69
LOS: 3 days | Discharge: HOME | End: 2024-10-15
Attending: EMERGENCY MEDICINE | Admitting: INTERNAL MEDICINE
Payer: MEDICARE

## 2024-10-12 DIAGNOSIS — R00.1 SYMPTOMATIC BRADYCARDIA: ICD-10-CM

## 2024-10-12 DIAGNOSIS — I44.2 AV BLOCK, COMPLETE: Primary | ICD-10-CM

## 2024-10-12 DIAGNOSIS — I44.2 THIRD DEGREE HEART BLOCK: ICD-10-CM

## 2024-10-12 LAB
ALBUMIN SERPL-MCNC: 4 G/DL (ref 3.5–5.2)
ALBUMIN/GLOB SERPL: 1.3 G/DL
ALP SERPL-CCNC: 89 U/L (ref 39–117)
ALT SERPL W P-5'-P-CCNC: 37 U/L (ref 1–41)
ANION GAP SERPL CALCULATED.3IONS-SCNC: 12.8 MMOL/L (ref 5–15)
AST SERPL-CCNC: 24 U/L (ref 1–40)
BASOPHILS # BLD AUTO: 0.07 10*3/MM3 (ref 0–0.2)
BASOPHILS NFR BLD AUTO: 0.7 % (ref 0–1.5)
BILIRUB SERPL-MCNC: 0.2 MG/DL (ref 0–1.2)
BUN SERPL-MCNC: 21 MG/DL (ref 8–23)
BUN/CREAT SERPL: 19.6 (ref 7–25)
CALCIUM SPEC-SCNC: 9.1 MG/DL (ref 8.6–10.5)
CHLORIDE SERPL-SCNC: 102 MMOL/L (ref 98–107)
CO2 SERPL-SCNC: 22.2 MMOL/L (ref 22–29)
CREAT SERPL-MCNC: 1.07 MG/DL (ref 0.76–1.27)
DEPRECATED RDW RBC AUTO: 42.2 FL (ref 37–54)
EGFRCR SERPLBLD CKD-EPI 2021: 74.2 ML/MIN/1.73
EOSINOPHIL # BLD AUTO: 0.11 10*3/MM3 (ref 0–0.4)
EOSINOPHIL NFR BLD AUTO: 1.2 % (ref 0.3–6.2)
ERYTHROCYTE [DISTWIDTH] IN BLOOD BY AUTOMATED COUNT: 12.3 % (ref 12.3–15.4)
GEN 5 2HR TROPONIN T REFLEX: 20 NG/L
GLOBULIN UR ELPH-MCNC: 3 GM/DL
GLUCOSE BLDC GLUCOMTR-MCNC: 137 MG/DL (ref 70–130)
GLUCOSE SERPL-MCNC: 175 MG/DL (ref 65–99)
HCT VFR BLD AUTO: 41.6 % (ref 37.5–51)
HGB BLD-MCNC: 12.9 G/DL (ref 13–17.7)
HOLD SPECIMEN: NORMAL
HOLD SPECIMEN: NORMAL
IMM GRANULOCYTES # BLD AUTO: 0.03 10*3/MM3 (ref 0–0.05)
IMM GRANULOCYTES NFR BLD AUTO: 0.3 % (ref 0–0.5)
LYMPHOCYTES # BLD AUTO: 1.34 10*3/MM3 (ref 0.7–3.1)
LYMPHOCYTES NFR BLD AUTO: 14.2 % (ref 19.6–45.3)
MCH RBC QN AUTO: 29.1 PG (ref 26.6–33)
MCHC RBC AUTO-ENTMCNC: 31 G/DL (ref 31.5–35.7)
MCV RBC AUTO: 93.7 FL (ref 79–97)
MONOCYTES # BLD AUTO: 0.87 10*3/MM3 (ref 0.1–0.9)
MONOCYTES NFR BLD AUTO: 9.2 % (ref 5–12)
NEUTROPHILS NFR BLD AUTO: 7.01 10*3/MM3 (ref 1.7–7)
NEUTROPHILS NFR BLD AUTO: 74.4 % (ref 42.7–76)
NRBC BLD AUTO-RTO: 0 /100 WBC (ref 0–0.2)
PLATELET # BLD AUTO: 222 10*3/MM3 (ref 140–450)
PMV BLD AUTO: 11.1 FL (ref 6–12)
POTASSIUM SERPL-SCNC: 4.5 MMOL/L (ref 3.5–5.2)
PROT SERPL-MCNC: 7 G/DL (ref 6–8.5)
RBC # BLD AUTO: 4.44 10*6/MM3 (ref 4.14–5.8)
SODIUM SERPL-SCNC: 137 MMOL/L (ref 136–145)
TROPONIN T DELTA: 5 NG/L
TROPONIN T SERPL HS-MCNC: 15 NG/L
TROPONIN T SERPL HS-MCNC: 16 NG/L
WBC NRBC COR # BLD AUTO: 9.43 10*3/MM3 (ref 3.4–10.8)
WHOLE BLOOD HOLD COAG: NORMAL
WHOLE BLOOD HOLD SPECIMEN: NORMAL

## 2024-10-12 PROCEDURE — C1894 INTRO/SHEATH, NON-LASER: HCPCS | Performed by: INTERNAL MEDICINE

## 2024-10-12 PROCEDURE — 25010000002 ATROPINE SULFATE

## 2024-10-12 PROCEDURE — 99291 CRITICAL CARE FIRST HOUR: CPT

## 2024-10-12 PROCEDURE — 71045 X-RAY EXAM CHEST 1 VIEW: CPT

## 2024-10-12 PROCEDURE — 82948 REAGENT STRIP/BLOOD GLUCOSE: CPT

## 2024-10-12 PROCEDURE — 25010000002 MIDAZOLAM PER 1 MG: Performed by: INTERNAL MEDICINE

## 2024-10-12 PROCEDURE — 93010 ELECTROCARDIOGRAM REPORT: CPT | Performed by: INTERNAL MEDICINE

## 2024-10-12 PROCEDURE — 84484 ASSAY OF TROPONIN QUANT: CPT | Performed by: INTERNAL MEDICINE

## 2024-10-12 PROCEDURE — 93005 ELECTROCARDIOGRAM TRACING: CPT | Performed by: EMERGENCY MEDICINE

## 2024-10-12 PROCEDURE — 93005 ELECTROCARDIOGRAM TRACING: CPT

## 2024-10-12 PROCEDURE — 33210 INSERT ELECTRD/PM CATH SNGL: CPT | Performed by: INTERNAL MEDICINE

## 2024-10-12 PROCEDURE — 99222 1ST HOSP IP/OBS MODERATE 55: CPT | Performed by: INTERNAL MEDICINE

## 2024-10-12 PROCEDURE — 85025 COMPLETE CBC W/AUTO DIFF WBC: CPT | Performed by: EMERGENCY MEDICINE

## 2024-10-12 PROCEDURE — 84484 ASSAY OF TROPONIN QUANT: CPT | Performed by: EMERGENCY MEDICINE

## 2024-10-12 PROCEDURE — 5A1223Z PERFORMANCE OF CARDIAC PACING, CONTINUOUS: ICD-10-PCS | Performed by: INTERNAL MEDICINE

## 2024-10-12 PROCEDURE — 25010000002 FENTANYL CITRATE (PF) 50 MCG/ML SOLUTION: Performed by: INTERNAL MEDICINE

## 2024-10-12 PROCEDURE — 80053 COMPREHEN METABOLIC PANEL: CPT | Performed by: EMERGENCY MEDICINE

## 2024-10-12 PROCEDURE — 25810000003 SODIUM CHLORIDE 0.9 % SOLUTION: Performed by: INTERNAL MEDICINE

## 2024-10-12 PROCEDURE — 25010000002 LIDOCAINE 2% SOLUTION: Performed by: INTERNAL MEDICINE

## 2024-10-12 RX ORDER — LIDOCAINE HYDROCHLORIDE 20 MG/ML
INJECTION, SOLUTION INFILTRATION; PERINEURAL
Status: DISCONTINUED | OUTPATIENT
Start: 2024-10-12 | End: 2024-10-12 | Stop reason: HOSPADM

## 2024-10-12 RX ORDER — SODIUM CHLORIDE 0.9 % (FLUSH) 0.9 %
10 SYRINGE (ML) INJECTION AS NEEDED
Status: DISCONTINUED | OUTPATIENT
Start: 2024-10-12 | End: 2024-10-15 | Stop reason: HOSPADM

## 2024-10-12 RX ORDER — SODIUM CHLORIDE 9 MG/ML
INJECTION, SOLUTION INTRAVENOUS
Status: COMPLETED | OUTPATIENT
Start: 2024-10-12 | End: 2024-10-12

## 2024-10-12 RX ORDER — MIDAZOLAM HYDROCHLORIDE 1 MG/ML
INJECTION INTRAMUSCULAR; INTRAVENOUS
Status: DISCONTINUED | OUTPATIENT
Start: 2024-10-12 | End: 2024-10-12 | Stop reason: HOSPADM

## 2024-10-12 RX ORDER — FENTANYL CITRATE 50 UG/ML
INJECTION, SOLUTION INTRAMUSCULAR; INTRAVENOUS
Status: DISCONTINUED | OUTPATIENT
Start: 2024-10-12 | End: 2024-10-12 | Stop reason: HOSPADM

## 2024-10-12 RX ADMIN — ATROPINE SULFATE 1 MG: 0.1 INJECTION INTRAVENOUS at 15:31

## 2024-10-12 NOTE — ED NOTES
"Nursing report ED to floor  Andrea Diaz  69 y.o.  male    HPI :  HPI  Stated Reason for Visit: low heart rate, dizziness when standing today, nausea, headache since approx 0400  History Obtained From: patient, family    Chief Complaint  Chief Complaint   Patient presents with    Dizziness    Nausea    Slow Heart Rate       Admitting doctor:   Arnoldo Pittman MD    Admitting diagnosis:   The primary encounter diagnosis was Third degree heart block. A diagnosis of Symptomatic bradycardia was also pertinent to this visit.    Code status:   Current Code Status       Date Active Code Status Order ID Comments User Context       Not on file            Allergies:   Metformin    Isolation:   No active isolations    Intake and Output  No intake or output data in the 24 hours ending 10/12/24 1519    Weight:       10/12/24  1441   Weight: 116 kg (255 lb)       Most recent vitals:   Vitals:    10/12/24 1427 10/12/24 1436 10/12/24 1441   BP:  130/72 148/73   Pulse: (!) 37  (!) 37   Resp:   20   Temp: 97.9 °F (36.6 °C)     SpO2: 99%  98%   Weight:   116 kg (255 lb)   Height:   170.2 cm (67\")       Active LDAs/IV Access:   Lines, Drains & Airways       Active LDAs       Name Placement date Placement time Site Days    Peripheral IV 10/12/24 1443 Left Antecubital 10/12/24  1443  Antecubital  less than 1                    Labs (abnormal labs have a star):   Labs Reviewed   COMPREHENSIVE METABOLIC PANEL - Abnormal; Notable for the following components:       Result Value    Glucose 175 (*)     All other components within normal limits    Narrative:     GFR Normal >60  Chronic Kidney Disease <60  Kidney Failure <15    The GFR formula is only valid for adults with stable renal function between ages 18 and 70.   CBC WITH AUTO DIFFERENTIAL - Abnormal; Notable for the following components:    Hemoglobin 12.9 (*)     MCHC 31.0 (*)     Lymphocyte % 14.2 (*)     Neutrophils, Absolute 7.01 (*)     All other components within " normal limits   TROPONIN - Normal    Narrative:     High Sensitive Troponin T Reference Range:  <14.0 ng/L- Negative Female for AMI  <22.0 ng/L- Negative Male for AMI  >=14 - Abnormal Female indicating possible myocardial injury.  >=22 - Abnormal Male indicating possible myocardial injury.   Clinicians would have to utilize clinical acumen, EKG, Troponin, and serial changes to determine if it is an Acute Myocardial Infarction or myocardial injury due to an underlying chronic condition.        RAINBOW DRAW    Narrative:     The following orders were created for panel order [place] Draw.  Procedure                               Abnormality         Status                     ---------                               -----------         ------                     Green Top (Gel)[[id number]]                                  Final result               Lavender Top[[id number]]                                     Final result               Gold Top - SST[[id number]]                                   Final result               Light Blue Top[[id number]]                                   Final result                 Please view results for these tests on the individual orders.   HIGH SENSITIVITIY TROPONIN T 2HR   CBC AND DIFFERENTIAL    Narrative:     The following orders were created for panel order CBC & Differential.  Procedure                               Abnormality         Status                     ---------                               -----------         ------                     CBC Auto Differential[[id number]]        Abnormal            Final result                 Please view results for these tests on the individual orders.   GREEN TOP   LAVENDER TOP   GOLD TOP - SST   LIGHT BLUE TOP       EKG:   ECG 12 Lead Rhythm Change   Preliminary Result   HEART RATE=38  bpm   RR Ykftiylb=2061  ms   ID Interval=  ms   P Horizontal Axis=  deg   P Front Axis=0  deg   QRSD [place]=955  ms   QT Gqpgdfwt=587  ms   NQsY=232  ms    QRS Axis=19  deg   T Wave Axis=-4  deg   - ABNORMAL ECG -   Complete AV block with wide QRS complex   Right bundle branch block   Date and Time of Study:2024-10-12 14:29:40          Meds given in ED:   Medications   sodium chloride 0.9 % flush 10 mL (has no administration in time range)       Imaging results:  No radiology results for the last day    Ambulatory status:   -  up with assistance     Social issues:   Social History     Socioeconomic History    Marital status:     Number of children: 2   Tobacco Use    Smoking status: Never     Passive exposure: Past    Smokeless tobacco: Never   Vaping Use    Vaping status: Never Used   Substance and Sexual Activity    Alcohol use: Not Currently    Drug use: No    Sexual activity: Defer       Peripheral Neurovascular  Peripheral Neurovascular (Adult)  Peripheral Neurovascular WDL: WDL    Neuro Cognitive  Neuro Cognitive (Adult)  Cognitive/Neuro/Behavioral WDL: WDL    Learning       Respiratory  Respiratory WDL  Respiratory WDL: WDL    Abdominal Pain       Pain Assessments  Pain (Adult)  (0-10) Pain Rating: Rest: 2  Pain Location: head  Pain Side/Orientation: anterior  Pain Onset/Duration: 0400  Pain Description: intermittent, aching  Pain Assessment Additional Detail  Pain Onset/Duration: 0400           Sherry Myles RN  10/12/24 15:19 EDT

## 2024-10-12 NOTE — CONSULTS
"[facility]  Cardiology Consult Note    Patient Identification:  Name: Andrea Diaz  Age: 69 y.o.  Sex: male  :  1955  MRN: [id number]             Requesting Physician:     Reason for Consultation / Chief Complaint: Complete heart    History of Present Illness:   69-year-old male came in with dizziness and lightheadedness, recently was admitted with syncopal episode    Comorbid cardiac risk factors:     Past Medical History:  Past Medical History:   Diagnosis Date    Essential (primary) hypertension     Other long term (current) drug therapy     Primary generalized (osteo)arthritis     Pure hypercholesterolemia     Type 2 diabetes mellitus without complication     Unilateral primary osteoarthritis, left knee      Past Surgical History:  Past Surgical History:   Procedure Laterality Date    COLONOSCOPY N/A 2017    Hyperplastic polyp, hemorrhoids    JOINT REPLACEMENT Right 2012    knee    KNEE ARTHROPLASTY Left 2015    KNEE SURGERY Right     meniscus      Allergies:  Allergies   Allergen Reactions    Metformin GI Intolerance     Home Meds:  (Not in a hospital admission)    Current Meds:   [unfilled]  Social History:   Social History     Tobacco Use    Smoking status: Never     Passive exposure: Past    Smokeless tobacco: Never   Substance Use Topics    Alcohol use: Not Currently      Family History:  Family History   Problem Relation Age of Onset    Coronary artery disease Mother     Pancreatic cancer Father     Diabetes type II Father     Colon cancer Other       Review of Systems  Constitutional: No wt loss, fever   Gastrointestinal: No nausea , abdominal pain  Behavioral/Psych: No insomnia or anxiety   Cardiovascular ----positive for dizziness lightheadedness. All other systems reviewed and are negative        /69   Pulse (!) 36   Temp 97.9 °F (36.6 °C)   Resp 20   Ht 170.2 cm (67\")   Wt 116 kg (255 lb)   SpO2 96%   BMI 39.94 kg/m²   General appearance: No acute " changes   Neck: Trachea midline; NECK, supple, no thyromegaly or lymphadenopathy   Lungs: Normal size and shape, normal breath sounds, equal distribution of air, no rales and rhonchi   CV: S1-S2 regular, no murmurs, no rub, no gallop   Abdomen: Soft, nontender; no masses , no abnormal abdominal sounds   Extremities: No deformity , normal color , no peripheral edema   Skin: Normal temperature, turgor and texture; no rash, ulcers                Cardiographics  ECG:     Telemetry:    Echocardiogram:     Imaging  Chest X-ray:     Lab Review   Results from last 7 days   Lab Units 10/12/24  1444   HSTROP T ng/L 16         Results from last 7 days   Lab Units 10/12/24  1444   SODIUM mmol/L 137   POTASSIUM mmol/L 4.5   BUN mg/dL 21   CREATININE mg/dL 1.07   CALCIUM mg/dL 9.1     @[id number]@  Results from last 7 days   Lab Units 10/12/24  1444   WBC 10*3/mm3 9.43   HEMOGLOBIN g/dL 12.9*   HEMATOCRIT % 41.6   PLATELETS 10*3/mm3 222             Assessment:  Complete heart block    Recommendations / Plan:     Patient asymptomatic  Beta-blockers on hold  Watch in CCU      Arnoldo Pittman MD  10/12/2024, 15:42 EDT      EMR Dragon/Transcription:   Dictated utilizing Dragon dictation

## 2024-10-12 NOTE — Clinical Note
A 6 fr sheath was successfully inserted using micropuncture technique with ultrasound guidance into the right internal jugular vein.

## 2024-10-12 NOTE — ED NOTES
Patient arrives via pv from home for complaints of bradycardia, dizziness, nausea, and weakness. Patient noticed his HR was in the 30's about 40 minutes ago. Alert and oriented x4.

## 2024-10-12 NOTE — PLAN OF CARE
Pt admitted to CICU today for heart block, went to cath lab for TVP placement, HR now 100% paced in the 70s. B/P stable, no complaints of pain. Family at bedside updated.     Goal Outcome Evaluation:  Plan of Care Reviewed With: patient, family        Progress: improving

## 2024-10-12 NOTE — PROGRESS NOTES
"Patient called and is experiencing low heart rate in the 32 range. He just did not \"feel well overall\". When I called him back he was on the way to the [facility] ER for further evaluation. We talked for a little bit and I definitely think this is the right decision and that he should be fully evaluated in the ER for sinus sick syndrome. He has a nurse friend who was driving him. He denied chest pain. Or shortness of air. Just felt weak. Dr. Duong"

## 2024-10-12 NOTE — ED PROVIDER NOTES
EMERGENCY DEPARTMENT ENCOUNTER  Room Number:  32/32  PCP: Velasquez Daniel MD  Independent Historians: Patient, girlfriend at bedside      HPI:  Chief Complaint: had concerns including Dizziness, Nausea, and Slow Heart Rate.     A complete HPI/ROS/PMH/PSH/SH/FH are unobtainable due to:   Chronic or social conditions impacting patient care (Social Determinants of Health):       Context: Andrea Diaz is a 69 y.o. male with a medical history of diabetes, hypertension and hyperlipidemia who presents to the ED c/o acute dizziness and lightheadedness.  Symptoms began around 4 AM this morning and have been fairly constant.  He reports being in his usual health without recent illness.  Patient did have an episode of near syncope about a month ago but did not seek medical treatment.  Patient denies any history of heart problems and does not have a cardiologist.  He did not take his carvedilol or amlodipine this morning.  Patient is a retired .  He does not smoke or drink significant alcohol.      Review of prior external notes (non-ED) -and- Review of prior external test results outside of this encounter:   I reviewed prior medical records including most recent office note with primary care provider Dr. Daniel.  Patient with history of diabetes, hypertension and hyperlipidemia.  For blood pressure he is on Norvasc and carvedilol 3.125 twice daily.      Prescription drug monitoring program review:         PAST MEDICAL HISTORY  Active Ambulatory Problems     Diagnosis Date Noted    Family history of colon cancer 11/06/2017    Screening for colon cancer 11/06/2017    Type 2 diabetes mellitus without complication, without long-term current use of insulin     Pure hypercholesterolemia     Primary generalized (osteo)arthritis     Essential hypertension     Mixed hyperlipidemia 03/15/2022    Morbid obesity 03/15/2022     Resolved Ambulatory Problems     Diagnosis Date Noted    No Resolved Ambulatory Problems      Past Medical History:   Diagnosis Date    Essential (primary) hypertension     Other long term (current) drug therapy     Type 2 diabetes mellitus without complication     Unilateral primary osteoarthritis, left knee          PAST SURGICAL HISTORY  Past Surgical History:   Procedure Laterality Date    COLONOSCOPY N/A 11/20/2017    Hyperplastic polyp, hemorrhoids    JOINT REPLACEMENT Right 08/2012    knee    KNEE ARTHROPLASTY Left 2015    KNEE SURGERY Right     meniscus         FAMILY HISTORY  Family History   Problem Relation Age of Onset    Coronary artery disease Mother     Pancreatic cancer Father     Diabetes type II Father     Colon cancer Other          SOCIAL HISTORY  Social History     Socioeconomic History    Marital status:     Number of children: 2   Tobacco Use    Smoking status: Never     Passive exposure: Past    Smokeless tobacco: Never   Vaping Use    Vaping status: Never Used   Substance and Sexual Activity    Alcohol use: Not Currently    Drug use: No    Sexual activity: Defer         ALLERGIES  Metformin      REVIEW OF SYSTEMS  Review of Systems   Constitutional:  Negative for fever.   Respiratory:  Negative for shortness of breath.    Cardiovascular:  Negative for chest pain.   Gastrointestinal:  Positive for diarrhea (Occasional diarrhea) and nausea.   Neurological:  Positive for dizziness and light-headedness.   All other systems reviewed and are negative.    Included in HPI  All systems reviewed and negative except for those discussed in HPI.      PHYSICAL EXAM    I have reviewed the triage vital signs and nursing notes.    ED Triage Vitals   Temp Heart Rate Resp BP SpO2   10/12/24 1427 10/12/24 1427 10/12/24 1441 10/12/24 1441 10/12/24 1427   97.9 °F (36.6 °C) (!) 37 20 148/73 99 %      Temp src Heart Rate Source Patient Position BP Location FiO2 (%)   -- -- -- -- --              Physical Exam  GENERAL: Alert and well-appearing male in no obvious distress.  Triage vitals reviewed  notable for pulse of 37.  Blood pressure 148/73.  Temperature and O2 sats normal   SKIN: Warm, dry  HENT: Normocephalic, atraumatic  EYES: no scleral icterus  CV: Bradycardic and regular with pulse in the 30s  RESPIRATORY: normal effort, lungs clear-O2 sats upper 90s room air  ABDOMEN: soft, nontender, nondistended  MUSCULOSKELETAL: no deformity  NEURO: alert, moves all extremities, follows commands      LAB RESULTS  Recent Results (from the past 24 hours)   ECG 12 Lead Rhythm Change    Collection Time: 10/12/24  2:29 PM   Result Value Ref Range    QT Interval 586 ms    QTC Interval 466 ms   Comprehensive Metabolic Panel    Collection Time: 10/12/24  2:44 PM    Specimen: Blood   Result Value Ref Range    Glucose 175 (H) 65 - 99 mg/dL    BUN 21 8 - 23 mg/dL    Creatinine 1.07 0.76 - 1.27 mg/dL    Sodium 137 136 - 145 mmol/L    Potassium 4.5 3.5 - 5.2 mmol/L    Chloride 102 98 - 107 mmol/L    CO2 22.2 22.0 - 29.0 mmol/L    Calcium 9.1 8.6 - 10.5 mg/dL    Total Protein 7.0 6.0 - 8.5 g/dL    Albumin 4.0 3.5 - 5.2 g/dL    ALT (SGPT) 37 1 - 41 U/L    AST (SGOT) 24 1 - 40 U/L    Alkaline Phosphatase 89 39 - 117 U/L    Total Bilirubin 0.2 0.0 - 1.2 mg/dL    Globulin 3.0 gm/dL    A/G Ratio 1.3 g/dL    BUN/Creatinine Ratio 19.6 7.0 - 25.0    Anion Gap 12.8 5.0 - 15.0 mmol/L    eGFR 74.2 >60.0 mL/min/1.73   High Sensitivity Troponin T    Collection Time: 10/12/24  2:44 PM    Specimen: Blood   Result Value Ref Range    HS Troponin T 16 <22 ng/L   Green Top (Gel)    Collection Time: 10/12/24  2:44 PM   Result Value Ref Range    Extra Tube Hold for add-ons.    Lavender Top    Collection Time: 10/12/24  2:44 PM   Result Value Ref Range    Extra Tube hold for add-on    Gold Top - SST    Collection Time: 10/12/24  2:44 PM   Result Value Ref Range    Extra Tube Hold for add-ons.    Light Blue Top    Collection Time: 10/12/24  2:44 PM   Result Value Ref Range    Extra Tube Hold for add-ons.    CBC Auto Differential    Collection Time:  10/12/24  2:44 PM    Specimen: Blood   Result Value Ref Range    WBC 9.43 3.40 - 10.80 10*3/mm3    RBC 4.44 4.14 - 5.80 10*6/mm3    Hemoglobin 12.9 (L) 13.0 - 17.7 g/dL    Hematocrit 41.6 37.5 - 51.0 %    MCV 93.7 79.0 - 97.0 fL    MCH 29.1 26.6 - 33.0 pg    MCHC 31.0 (L) 31.5 - 35.7 g/dL    RDW 12.3 12.3 - 15.4 %    RDW-SD 42.2 37.0 - 54.0 fl    MPV 11.1 6.0 - 12.0 fL    Platelets 222 140 - 450 10*3/mm3    Neutrophil % 74.4 42.7 - 76.0 %    Lymphocyte % 14.2 (L) 19.6 - 45.3 %    Monocyte % 9.2 5.0 - 12.0 %    Eosinophil % 1.2 0.3 - 6.2 %    Basophil % 0.7 0.0 - 1.5 %    Immature Grans % 0.3 0.0 - 0.5 %    Neutrophils, Absolute 7.01 (H) 1.70 - 7.00 10*3/mm3    Lymphocytes, Absolute 1.34 0.70 - 3.10 10*3/mm3    Monocytes, Absolute 0.87 0.10 - 0.90 10*3/mm3    Eosinophils, Absolute 0.11 0.00 - 0.40 10*3/mm3    Basophils, Absolute 0.07 0.00 - 0.20 10*3/mm3    Immature Grans, Absolute 0.03 0.00 - 0.05 10*3/mm3    nRBC 0.0 0.0 - 0.2 /100 WBC         RADIOLOGY  No Radiology Exams Resulted Within Past 24 Hours      MEDICATIONS GIVEN IN ER  Medications   sodium chloride 0.9 % flush 10 mL (has no administration in time range)   atropine sulfate injection 1 mg (1 mg Intravenous Given 10/12/24 1531)         ORDERS PLACED DURING THIS VISIT:  Orders Placed This Encounter   Procedures    XR Chest 1 View    [place] Draw    Comprehensive Metabolic Panel    High Sensitivity Troponin T    CBC Auto Differential    High Sensitivity Troponin T 2Hr    NPO Diet NPO Type: Strict NPO    Undress & Gown    Continuous Pulse Oximetry    Interventional Cardiology (on-call MD unless specified)    Oxygen Therapy- Nasal Cannula; Titrate 1-6 LPM Per SpO2; 90 - 95%    ECG 12 Lead Rhythm Change    Insert Peripheral IV    Inpatient Admission    CBC & Differential    Green Top (Gel)    Lavender Top    Gold Top - SST    Light Blue Top         OUTPATIENT MEDICATION MANAGEMENT:  Current Facility-Administered Medications Ordered in Epic   Medication Dose  Route Frequency Provider Last Rate Last Admin    sodium chloride 0.9 % flush 10 mL  10 mL Intravenous PRN Ilia Martinez MD         Current Outpatient Medications Ordered in Epic   Medication Sig Dispense Refill    amLODIPine (NORVASC) 10 MG tablet Take 1 tablet by mouth Daily. 90 tablet 3    atorvastatin (LIPITOR) 10 MG tablet Take 1 tablet by mouth Daily. 90 tablet 3    Blood Glucose Monitoring Suppl (OneTouch Verio Flex System) w/Device kit See Admin Instructions.      carvedilol (COREG) 3.125 MG tablet Take 1 tablet by mouth 2 (Two) Times a Day With Meals. 180 tablet 3    cefdinir (OMNICEF) 300 MG capsule Take 1 capsule by mouth 2 (Two) Times a Day. 20 capsule 0    diclofenac (VOLTAREN) 75 MG EC tablet Take 1 tablet by mouth 2 (Two) Times a Day With Meals. 180 tablet 3    donepezil (ARICEPT) 10 MG tablet Take 1 tablet by mouth Every Night.      famotidine (PEPCID) 20 MG tablet Take 1 tablet by mouth every night at bedtime. 90 tablet 3    glucosamine sulfate 500 MG capsule capsule Take  by mouth 2 (Two) Times a Day With Meals.      glucose blood test strip Check blood sugar once daily  DX E11.9 100 each 3    glucose monitor monitoring kit Check blood sugar once daily  DX E11.9 1 each 0    Lancets Thin misc Check blood sugar once daily  DX E11.9 100 each 3    ondansetron (ZOFRAN) 4 MG tablet Take 1 tablet by mouth Every 8 (Eight) Hours As Needed.      venlafaxine XR (Effexor XR) 37.5 MG 24 hr capsule Take 1 capsule by mouth Daily. 30 capsule 1         PROCEDURES  Critical Care    Performed by: Ilia Martinez MD  Authorized by: Ilia Martinez MD    Critical care provider statement:     Critical care time (minutes):  39    Critical care was necessary to treat or prevent imminent or life-threatening deterioration of the following conditions:  Circulatory failure    Critical care was time spent personally by me on the following activities:  Development of treatment plan with patient or surrogate, discussions  with consultants, discussions with primary provider, evaluation of patient's response to treatment, examination of patient, obtaining history from patient or surrogate, review of old charts, re-evaluation of patient's condition, pulse oximetry, ordering and review of radiographic studies, ordering and review of laboratory studies and ordering and performing treatments and interventions              PROGRESS, DATA ANALYSIS, CONSULTS, AND MEDICAL DECISION MAKING  All labs have been independently interpreted by me.  All radiology studies have been reviewed by me. All EKG's have been independently viewed and interpreted by me.  Discussion below represents my analysis of pertinent findings related to patient's condition, differential diagnosis, treatment plan and final disposition.    Differential diagnosis includes but is not limited to heart block, medication reaction, electrolyte disturbance.      ED Course as of 10/12/24 1535   Sat Oct 12, 2024   1453 EKG independently interpreted by me    Time 2:29 PM  Heart rate 38 with junctional escape  P waves-normal P waves, complete heart block  QRS-normal axis, normal QRS  ST, T waves-nonspecific changes  When compared to 2015 heart block is new.   [DB]   1502 Patient currently is in third-degree heart block with junctional escape and heart rate in the upper 30s.  He seems to be relatively asymptomatic right now other than mild dizziness.    He did not take his carvedilol this morning.    Pacemaker pads have been placed in case he should need pacing for worsening symptomatic bradycardia.    Will go ahead and put a page out to interventional cardiology to discuss third-degree heart block. [DB]   1500 I discussed treatment and evaluation this patient with Dr. Pittman who is on-call for interventional cardiology.  He will come down to see the patient but plans to put him in the CCU for overnight monitoring, consider transvenous versus permanent pacemaker. [DB]   1534   Zain has come down to see the patient and would like to try some atropine to see if this increases the heart rate. [DB]   1534 Patient did have increase of heart rate after atropine up into the 40s and 50s. [DB]      ED Course User Index  [DB] Ilia Martinez MD             AS OF 15:35 EDT VITALS:    BP - 132/69  HR - (!) 36  TEMP - 97.9 °F (36.6 °C)  O2 SATS - 96%    COMPLEXITY OF CARE  69-year-old male with history of diabetes, hypertension hyperlipidemia presents with dizziness and lightheadedness.    Please see ED course above my independent evaluation and interpretation of ED testing including x-ray, EKG and laboratory analysis.    I did emergently contact interventional cardiology, Dr. Pittman who saw the patient in the ED.  We did give some IV atropine with improvement of heart rate.    Plan admission to the coronary care unit for continuous monitoring, plan pacemaker placement in the next day or 2.      DIAGNOSIS  Final diagnoses:   Third degree heart block   Symptomatic bradycardia         DISPOSITION  ED Disposition       ED Disposition   Decision to Admit    Condition   --    Comment   Level of Care: Critical Care [6]   Diagnosis: Third degree heart block [490361]   Admitting Physician: OMAR PITTMAN [[id number]]   Attending Physician: OMAR PITTMAN [[id number]]   Certification: I Certify That Inpatient Hospital Services Are Medically Necessary For Greater Than 2 Midnights                  Please note that portions of this document were completed with a voice recognition program.    Note Disclaimer: At [facility], we believe that sharing information builds trust and better relationships. You are receiving this note because you recently visited [facility]. It is possible you will see health information before a provider has talked with you about it. This kind of information can be easy to misunderstand. To help you fully understand what it means for your health, we urge you to  discuss this note with your provider.         Ilia Martinez MD  10/12/24 7945

## 2024-10-12 NOTE — Clinical Note
Patient calling in after speaking with Dr Hu today 5/30/2024.  She said she is returning to work tomorrow 5/31/2024 but needs a new note stating she is restricted to 4 hour shifts.  Please verify and she is asking if that can be emailed to her?  Please call her to confirm/advise.   Patient was given Post Procedure instruction by the staff.

## 2024-10-12 NOTE — Clinical Note
A sheath was successfully inserted using micropuncture technique with ultrasound guidance into the left axillary vein. Sheath insertion delayed.

## 2024-10-13 PROBLEM — K21.9 GERD WITHOUT ESOPHAGITIS: Status: ACTIVE | Noted: 2024-10-13

## 2024-10-13 LAB
ANION GAP SERPL CALCULATED.3IONS-SCNC: 11.8 MMOL/L (ref 5–15)
BUN SERPL-MCNC: 18 MG/DL (ref 8–23)
BUN/CREAT SERPL: 16.8 (ref 7–25)
CALCIUM SPEC-SCNC: 9 MG/DL (ref 8.6–10.5)
CHLORIDE SERPL-SCNC: 102 MMOL/L (ref 98–107)
CO2 SERPL-SCNC: 24.2 MMOL/L (ref 22–29)
CREAT SERPL-MCNC: 1.07 MG/DL (ref 0.76–1.27)
DEPRECATED RDW RBC AUTO: 42.5 FL (ref 37–54)
EGFRCR SERPLBLD CKD-EPI 2021: 74.2 ML/MIN/1.73
ERYTHROCYTE [DISTWIDTH] IN BLOOD BY AUTOMATED COUNT: 12.4 % (ref 12.3–15.4)
GLUCOSE BLDC GLUCOMTR-MCNC: 136 MG/DL (ref 70–130)
GLUCOSE BLDC GLUCOMTR-MCNC: 172 MG/DL (ref 70–130)
GLUCOSE BLDC GLUCOMTR-MCNC: 208 MG/DL (ref 70–130)
GLUCOSE SERPL-MCNC: 170 MG/DL (ref 65–99)
HCT VFR BLD AUTO: 38.2 % (ref 37.5–51)
HGB BLD-MCNC: 12.1 G/DL (ref 13–17.7)
MCH RBC QN AUTO: 29.3 PG (ref 26.6–33)
MCHC RBC AUTO-ENTMCNC: 31.7 G/DL (ref 31.5–35.7)
MCV RBC AUTO: 92.5 FL (ref 79–97)
PLATELET # BLD AUTO: 181 10*3/MM3 (ref 140–450)
PMV BLD AUTO: 11.4 FL (ref 6–12)
POTASSIUM SERPL-SCNC: 3.9 MMOL/L (ref 3.5–5.2)
QT INTERVAL: 482 MS
QT INTERVAL: 586 MS
QTC INTERVAL: 466 MS
QTC INTERVAL: 521 MS
RBC # BLD AUTO: 4.13 10*6/MM3 (ref 4.14–5.8)
SODIUM SERPL-SCNC: 138 MMOL/L (ref 136–145)
WBC NRBC COR # BLD AUTO: 9.33 10*3/MM3 (ref 3.4–10.8)

## 2024-10-13 PROCEDURE — 25010000002 ONDANSETRON PER 1 MG: Performed by: INTERNAL MEDICINE

## 2024-10-13 PROCEDURE — 80048 BASIC METABOLIC PNL TOTAL CA: CPT | Performed by: INTERNAL MEDICINE

## 2024-10-13 PROCEDURE — 63710000001 INSULIN LISPRO (HUMAN) PER 5 UNITS: Performed by: HOSPITALIST

## 2024-10-13 PROCEDURE — 85027 COMPLETE CBC AUTOMATED: CPT | Performed by: INTERNAL MEDICINE

## 2024-10-13 PROCEDURE — 93005 ELECTROCARDIOGRAM TRACING: CPT | Performed by: INTERNAL MEDICINE

## 2024-10-13 PROCEDURE — 99232 SBSQ HOSP IP/OBS MODERATE 35: CPT | Performed by: INTERNAL MEDICINE

## 2024-10-13 PROCEDURE — 82948 REAGENT STRIP/BLOOD GLUCOSE: CPT

## 2024-10-13 RX ORDER — IBUPROFEN 600 MG/1
1 TABLET ORAL
Status: DISCONTINUED | OUTPATIENT
Start: 2024-10-13 | End: 2024-10-15 | Stop reason: HOSPADM

## 2024-10-13 RX ORDER — FAMOTIDINE 20 MG/1
20 TABLET, FILM COATED ORAL
Status: DISCONTINUED | OUTPATIENT
Start: 2024-10-13 | End: 2024-10-13

## 2024-10-13 RX ORDER — CALCIUM CARBONATE 500 MG/1
2 TABLET, CHEWABLE ORAL 3 TIMES DAILY PRN
Status: DISCONTINUED | OUTPATIENT
Start: 2024-10-13 | End: 2024-10-15 | Stop reason: HOSPADM

## 2024-10-13 RX ORDER — NICOTINE POLACRILEX 4 MG
15 LOZENGE BUCCAL
Status: DISCONTINUED | OUTPATIENT
Start: 2024-10-13 | End: 2024-10-15 | Stop reason: HOSPADM

## 2024-10-13 RX ORDER — ATORVASTATIN CALCIUM 20 MG/1
10 TABLET, FILM COATED ORAL DAILY
Status: DISCONTINUED | OUTPATIENT
Start: 2024-10-13 | End: 2024-10-14

## 2024-10-13 RX ORDER — INSULIN LISPRO 100 [IU]/ML
2-7 INJECTION, SOLUTION INTRAVENOUS; SUBCUTANEOUS
Status: DISCONTINUED | OUTPATIENT
Start: 2024-10-13 | End: 2024-10-15 | Stop reason: HOSPADM

## 2024-10-13 RX ORDER — DEXTROSE MONOHYDRATE 25 G/50ML
25 INJECTION, SOLUTION INTRAVENOUS
Status: DISCONTINUED | OUTPATIENT
Start: 2024-10-13 | End: 2024-10-15 | Stop reason: HOSPADM

## 2024-10-13 RX ORDER — ACETAMINOPHEN 325 MG/1
650 TABLET ORAL EVERY 6 HOURS PRN
Status: DISCONTINUED | OUTPATIENT
Start: 2024-10-13 | End: 2024-10-15 | Stop reason: HOSPADM

## 2024-10-13 RX ORDER — ONDANSETRON 2 MG/ML
4 INJECTION INTRAMUSCULAR; INTRAVENOUS EVERY 4 HOURS PRN
Status: DISCONTINUED | OUTPATIENT
Start: 2024-10-13 | End: 2024-10-15 | Stop reason: HOSPADM

## 2024-10-13 RX ORDER — FAMOTIDINE 20 MG/1
20 TABLET, FILM COATED ORAL NIGHTLY
Status: DISCONTINUED | OUTPATIENT
Start: 2024-10-13 | End: 2024-10-15 | Stop reason: HOSPADM

## 2024-10-13 RX ADMIN — Medication 2 TABLET: at 15:22

## 2024-10-13 RX ADMIN — INSULIN LISPRO 2 UNITS: 100 INJECTION, SOLUTION INTRAVENOUS; SUBCUTANEOUS at 17:30

## 2024-10-13 RX ADMIN — FAMOTIDINE 20 MG: 20 TABLET, FILM COATED ORAL at 20:17

## 2024-10-13 RX ADMIN — ONDANSETRON 4 MG: 2 INJECTION, SOLUTION INTRAMUSCULAR; INTRAVENOUS at 08:15

## 2024-10-13 RX ADMIN — ATORVASTATIN CALCIUM 10 MG: 20 TABLET, FILM COATED ORAL at 20:16

## 2024-10-13 NOTE — CONSULTS
Name: Andrea Diaz ADMIT: 10/12/2024   : 1955  PCP: Velasquez Daniel MD    MRN: [id number] LOS: 1 days   AGE/SEX: 69 y.o. male  ROOM: [facility] [place] 2]           Inpatient Hospitalist Consult  Consult performed by: Jarrod Sargent MD  Consult ordered by: Arnoldo Pittman MD  Reason for consult: Management of chronic medical conditions including diabetes      Date of Admit: 10/12/2024  Date of Consult: 10/13/24    Subjective   History of Present Illness  70 yo male with h/o HTN and diabetes which has recently been diet controlled who came in yesterday with dizziness and lightheadedness. He was found to be in 3rd degree heart block and was admitted to ICU [facility] cardiology service after a temporary pacer was placed. He is currently feeling better with HR being paced at 70. He does c/o some indigestion.         Past Medical History:   Diagnosis Date    Essential (primary) hypertension     Other long term (current) drug therapy     Primary generalized (osteo)arthritis     Pure hypercholesterolemia     Type 2 diabetes mellitus without complication     Unilateral primary osteoarthritis, left knee      Past Surgical History:   Procedure Laterality Date    COLONOSCOPY N/A 2017    Hyperplastic polyp, hemorrhoids    JOINT REPLACEMENT Right 2012    knee    KNEE ARTHROPLASTY Left 2015    KNEE SURGERY Right     meniscus     Family History   Problem Relation Age of Onset    Coronary artery disease Mother     Pancreatic cancer Father     Diabetes type II Father     Colon cancer Other      Social History     Tobacco Use    Smoking status: Never     Passive exposure: Never    Smokeless tobacco: Never   Vaping Use    Vaping status: Never Used   Substance Use Topics    Alcohol use: Not Currently    Drug use: No     Medications Prior to Admission   Medication Sig Dispense Refill Last Dose/Taking    carvedilol (COREG) 3.125 MG tablet Take 1 tablet by mouth 2 (Two) Times a Day With Meals. 180 tablet  3 10/11/2024    amLODIPine (NORVASC) 10 MG tablet Take 1 tablet by mouth Daily. 90 tablet 3     atorvastatin (LIPITOR) 10 MG tablet Take 1 tablet by mouth Daily. 90 tablet 3     Blood Glucose Monitoring Suppl (OneTouch Verio Flex System) w/Device kit See Admin Instructions.       cefdinir (OMNICEF) 300 MG capsule Take 1 capsule by mouth 2 (Two) Times a Day. 20 capsule 0     diclofenac (VOLTAREN) 75 MG EC tablet Take 1 tablet by mouth 2 (Two) Times a Day With Meals. 180 tablet 3     famotidine (PEPCID) 20 MG tablet Take 1 tablet by mouth every night at bedtime. 90 tablet 3     glucosamine sulfate 500 MG capsule capsule Take  by mouth 2 (Two) Times a Day With Meals.       glucose blood test strip Check blood sugar once daily  DX E11.9 100 each 3     glucose monitor monitoring kit Check blood sugar once daily  DX E11.9 1 each 0     Lancets Thin misc Check blood sugar once daily  DX E11.9 100 each 3     ondansetron (ZOFRAN) 4 MG tablet Take 1 tablet by mouth Every 8 (Eight) Hours As Needed.        Allergies:  Metformin    Review of Systems   Constitutional:  Negative for chills and fever.   HENT:  Negative for congestion and trouble swallowing.    Eyes:  Negative for visual disturbance.   Respiratory:  Negative for cough, chest tightness, shortness of breath and wheezing.    Cardiovascular:  Negative for chest pain and palpitations.   Gastrointestinal:  Negative for abdominal distention, abdominal pain, constipation, diarrhea, nausea and vomiting.   Genitourinary:  Negative for dysuria, frequency and urgency.   Musculoskeletal:  Negative for arthralgias.   Skin:  Negative for rash.   Neurological:  Negative for dizziness, weakness and headaches.   Psychiatric/Behavioral:  Negative for agitation and confusion.        Objective      Vital Signs  Temp:  [97.4 °F (36.3 °C)-98 °F (36.7 °C)] 97.9 °F (36.6 °C)  Heart Rate:  [68-72] 70  BP: (101-159)/(56-98) 146/81  Body mass index is 42.81 kg/m².    Physical Exam  Vitals  reviewed.   Constitutional:       General: He is not in acute distress.     Appearance: He is obese.   Cardiovascular:      Rate and Rhythm: Normal rate.      Heart sounds: No murmur heard.  Pulmonary:      Effort: No respiratory distress.      Breath sounds: Normal breath sounds. No wheezing.   Abdominal:      General: There is no distension.      Palpations: Abdomen is soft.      Tenderness: There is no abdominal tenderness.   Musculoskeletal:      Right lower leg: No edema.      Left lower leg: No edema.   Skin:     General: Skin is warm and dry.   Neurological:      Mental Status: He is alert and oriented to person, place, and time.         Results Review:    I reviewed the patient's new clinical results.      Assessment & Plan       Third degree heart block    Type 2 diabetes mellitus without complication, without long-term current use of insulin    Pure hypercholesterolemia    Essential hypertension    GERD without esophagitis      Cardiology managing pacer/bradycardia. Holding BB. Will continue off antihypertensives for now, monitor BP closely.    DM2: controlled fairly well, will add SSI to use as needed    GERD: Added back home Pepcid and Tums prn    Thank you very much for asking [facility] to be involved in this patient's care.  We will follow along with you.      Jarrod Sargent MD  [facility]  10/13/24  17:38 EDT

## 2024-10-13 NOTE — PROGRESS NOTES
[facility]  Cardiology Progress Note    Patient Identification:  Name: Andrea Diaz  Age: 69 y.o.  Sex: male  :  1955  MRN: [id number]                 Follow Up / Chief Complaint: Complete heart block    Interval History:       Subjective:  Atypical few seconds chest pain    Objective:    Past Medical History:  Past Medical History:   Diagnosis Date    Essential (primary) hypertension     Other long term (current) drug therapy     Primary generalized (osteo)arthritis     Pure hypercholesterolemia     Type 2 diabetes mellitus without complication     Unilateral primary osteoarthritis, left knee      Past Surgical History:  Past Surgical History:   Procedure Laterality Date    COLONOSCOPY N/A 2017    Hyperplastic polyp, hemorrhoids    JOINT REPLACEMENT Right 2012    knee    KNEE ARTHROPLASTY Left 2015    KNEE SURGERY Right     meniscus        Social History:   Social History     Tobacco Use    Smoking status: Never     Passive exposure: Never    Smokeless tobacco: Never   Substance Use Topics    Alcohol use: Not Currently      Family History:  Family History   Problem Relation Age of Onset    Coronary artery disease Mother     Pancreatic cancer Father     Diabetes type II Father     Colon cancer Other           Allergies:  Allergies   Allergen Reactions    Metformin GI Intolerance     Scheduled Meds:           INTAKE AND OUTPUT:    Intake/Output Summary (Last 24 hours) at 10/13/2024 1143  Last data filed at 10/13/2024 1028  Gross per 24 hour   Intake 360 ml   Output 3425 ml   Net -3065 ml       Review of Systems:   GI:  Cardiac: Atypical chest pain  Pulmonary:    Constitutional:  Temp:  [97.8 °F (36.6 °C)-99.1 °F (37.3 °C)] 97.8 °F (36.6 °C)  Heart Rate:  [36-72] 70  Resp:  [11-21] 11  BP: (101-148)/(56-98) 136/74    Physical Exam:  General:  Appears in no acute distress  Eyes: PERTL,  HEENT:  No JVD. Thyroid not visibly enlarged. No mucosal pallor or cyanosis  Respiratory:  "Respirations regular and unlabored at rest. BBS with good air entry in all fields. No crackles, rubs or wheezes auscultated  Cardiovascular: S1S2 Regular rate and rhythm. No murmur, rub or gallop. No carotid bruits. DP/PT pulses     . No pretibial pitting edema  Gastrointestinal: Abdomen soft, flat, non tender. Bowel sounds present. No hepatosplenomegaly. No ascites  Musculoskeletal: DANIELS x4. No abnormal movements  Extremities: No digital clubbing or cyanosis  Skin: Color pink. Skin warm and dry to touch. No rashes    Neuro: AAO x3 CN II-XII grossly intact  Psych: Mood and affect normal, pleasant and cooperative          Cardiographics  Telemetry:     ECG:     Echocardiogram:     Lab Review   Results from last 7 days   Lab Units 10/12/24  2013 10/12/24  1819 10/12/24  1444   HSTROP T ng/L 20 15 16         Results from last 7 days   Lab Units 10/13/24  0353   SODIUM mmol/L 138   POTASSIUM mmol/L 3.9   BUN mg/dL 18   CREATININE mg/dL 1.07   CALCIUM mg/dL 9.0     @[id number]@  Results from last 7 days   Lab Units 10/13/24  0353 10/12/24  1444   WBC 10*3/mm3 9.33 9.43   HEMOGLOBIN g/dL 12.1* 12.9*   HEMATOCRIT % 38.2 41.6   PLATELETS 10*3/mm3 181 222             Assessment:  Complete heart block  Status post temporary pacemaker    Plan:  N.p.o. after midnight for possible permanent pacemaker in the  Labs/tests ordered for am      )10/13/2024  Arnoldo iPttman MD      [facility] Dragon/Transcription:   \"Dictated utilizing Dragon dictation\".     "

## 2024-10-13 NOTE — H&P
Patient Care Team:  Velasquez Daniel MD as PCP - General (Family Medicine)    Chief complaint dizziness lightheadedness near syncope    Subjective     Patient is a 69 y.o. male presents with dizziness as well as lightheadedness. Onset of symptoms was abrupt starting several hours ago.  Symptoms are associated with weakness and tiredness.  Symptoms are aggravated by none.   Symptoms improve with none. Severity 8/10 context at rest quality     Review of Systems   Weakness tiredness shortness of breath    History  Past Medical History:   Diagnosis Date    Essential (primary) hypertension     Other long term (current) drug therapy     Primary generalized (osteo)arthritis     Pure hypercholesterolemia     Type 2 diabetes mellitus without complication     Unilateral primary osteoarthritis, left knee      Past Surgical History:   Procedure Laterality Date    COLONOSCOPY N/A 11/20/2017    Hyperplastic polyp, hemorrhoids    JOINT REPLACEMENT Right 08/2012    knee    KNEE ARTHROPLASTY Left 2015    KNEE SURGERY Right     meniscus     Family History   Problem Relation Age of Onset    Coronary artery disease Mother     Pancreatic cancer Father     Diabetes type II Father     Colon cancer Other      Social History     Tobacco Use    Smoking status: Never     Passive exposure: Never    Smokeless tobacco: Never   Vaping Use    Vaping status: Never Used   Substance Use Topics    Alcohol use: Not Currently    Drug use: No     E-cigarette/Vaping    E-cigarette/Vaping Use Never User      E-cigarette/Vaping Substances    Nicotine No     THC No     CBD No     Flavoring No      Medications Prior to Admission   Medication Sig Dispense Refill Last Dose/Taking    carvedilol (COREG) 3.125 MG tablet Take 1 tablet by mouth 2 (Two) Times a Day With Meals. 180 tablet 3 10/11/2024    amLODIPine (NORVASC) 10 MG tablet Take 1 tablet by mouth Daily. 90 tablet 3     atorvastatin (LIPITOR) 10 MG tablet Take 1 tablet by mouth Daily. 90 tablet  3     Blood Glucose Monitoring Suppl (OneTouch Verio Flex System) w/Device kit See Admin Instructions.       cefdinir (OMNICEF) 300 MG capsule Take 1 capsule by mouth 2 (Two) Times a Day. 20 capsule 0     diclofenac (VOLTAREN) 75 MG EC tablet Take 1 tablet by mouth 2 (Two) Times a Day With Meals. 180 tablet 3     famotidine (PEPCID) 20 MG tablet Take 1 tablet by mouth every night at bedtime. 90 tablet 3     glucosamine sulfate 500 MG capsule capsule Take  by mouth 2 (Two) Times a Day With Meals.       glucose blood test strip Check blood sugar once daily  DX E11.9 100 each 3     glucose monitor monitoring kit Check blood sugar once daily  DX E11.9 1 each 0     Lancets Thin misc Check blood sugar once daily  DX E11.9 100 each 3     ondansetron (ZOFRAN) 4 MG tablet Take 1 tablet by mouth Every 8 (Eight) Hours As Needed.        Allergies:  Metformin    Objective     Vital Signs  Temp:  [97.8 °F (36.6 °C)-99.1 °F (37.3 °C)] 97.8 °F (36.6 °C)  Heart Rate:  [36-72] 70  Resp:  [11-21] 11  BP: (101-148)/(56-98) 136/74    Physical Exam:      General Appearance:  Alert, cooperative, in no acute distress   Head:  Normocephalic, without obvious abnormality, atraumatic   Eyes:  Lids and lashes normal, conjunctivae and sclerae normal, no icterus, no pallor, corneas clear, PERRLA   Ears:  Ears appear intact with no abnormalities noted   Throat:  No oral lesions, no thrush, oral mucosa moist   Neck:  No adenopathy, supple, trachea midline, no thyromegaly, no carotid bruit, no JVD   Back:  No kyphosis present, no scoliosis present, no skin lesions, erythema or scars, no tenderness to percussion or palpation, range of motion normal   Lungs:  Clear to auscultation, respirations regular, even and unlabored    Heart:  Regular rhythm and normal rate, normal S1 and S2, no murmur, no gallop, no rub, no click   Chest Wall:  No abnormalities observed   Abdomen:  Normal bowel sounds, no masses, no organomegaly, soft non-tender, non-distended,  no guarding, no rebound tenderness   Rectal:  Deferred   Extremities:  Moves all extremities well, no edema, no cyanosis, no redness   Pulses:  Pulses palpable and equal bilaterally   Skin:  No bleeding, bruising or rash   Lymph nodes:  No palpable adenopathy   Neurologic:  Cranial nerves 2 - 12 grossly intact, sensation intact, DTR present and equal bilaterally                                                                               Results Review:    I reviewed the patient's new clinical results.    Assessment & Plan       Third degree heart block      **  Considering the complete heart block will proceed with temporary pacemaker implantation    Will repeat EKGs and enzymes    Check echocardiogram  Most likely will need permanent pacemaker    Arnoldo Pittman MD    I discussed the patients findings and my recommendations with patient.     Arnoldo Pittman MD  10/13/24  11:40 EDT    Time:

## 2024-10-13 NOTE — PLAN OF CARE
Goal Outcome Evaluation:         Pt remains in CICU with TVP in place. Pt has been 100% paced. Adequate UOP.

## 2024-10-13 NOTE — PLAN OF CARE
Pt remains in CICU on room air, TVP in place, 100% paced, EP consulted to see pt in am, NPO at midnight for possible permanent pacemaker placement. Family updated at bedside.     Goal Outcome Evaluation:  Plan of Care Reviewed With: patient      Progress: improving

## 2024-10-14 PROBLEM — I44.2 AV BLOCK, COMPLETE: Status: ACTIVE | Noted: 2024-10-12

## 2024-10-14 LAB
GLUCOSE BLDC GLUCOMTR-MCNC: 112 MG/DL (ref 70–130)
GLUCOSE BLDC GLUCOMTR-MCNC: 118 MG/DL (ref 70–130)
GLUCOSE BLDC GLUCOMTR-MCNC: 153 MG/DL (ref 70–130)
GLUCOSE BLDC GLUCOMTR-MCNC: 215 MG/DL (ref 70–130)

## 2024-10-14 PROCEDURE — 99223 1ST HOSP IP/OBS HIGH 75: CPT

## 2024-10-14 PROCEDURE — 25010000002 VANCOMYCIN PER 500 MG: Performed by: INTERNAL MEDICINE

## 2024-10-14 PROCEDURE — C1894 INTRO/SHEATH, NON-LASER: HCPCS | Performed by: INTERNAL MEDICINE

## 2024-10-14 PROCEDURE — C1769 GUIDE WIRE: HCPCS | Performed by: INTERNAL MEDICINE

## 2024-10-14 PROCEDURE — C1898 LEAD, PMKR, OTHER THAN TRANS: HCPCS | Performed by: INTERNAL MEDICINE

## 2024-10-14 PROCEDURE — 0JH606Z INSERTION OF PACEMAKER, DUAL CHAMBER INTO CHEST SUBCUTANEOUS TISSUE AND FASCIA, OPEN APPROACH: ICD-10-PCS | Performed by: INTERNAL MEDICINE

## 2024-10-14 PROCEDURE — 33208 INSRT HEART PM ATRIAL & VENT: CPT | Performed by: INTERNAL MEDICINE

## 2024-10-14 PROCEDURE — 25010000002 FENTANYL CITRATE (PF) 50 MCG/ML SOLUTION: Performed by: INTERNAL MEDICINE

## 2024-10-14 PROCEDURE — 99232 SBSQ HOSP IP/OBS MODERATE 35: CPT | Performed by: NURSE PRACTITIONER

## 2024-10-14 PROCEDURE — 25010000002 LIDOCAINE 1 % SOLUTION: Performed by: INTERNAL MEDICINE

## 2024-10-14 PROCEDURE — 93005 ELECTROCARDIOGRAM TRACING: CPT | Performed by: NURSE PRACTITIONER

## 2024-10-14 PROCEDURE — 25010000002 MIDAZOLAM PER 1 MG: Performed by: INTERNAL MEDICINE

## 2024-10-14 PROCEDURE — C1785 PMKR, DUAL, RATE-RESP: HCPCS | Performed by: INTERNAL MEDICINE

## 2024-10-14 PROCEDURE — 63710000001 INSULIN LISPRO (HUMAN) PER 5 UNITS: Performed by: HOSPITALIST

## 2024-10-14 PROCEDURE — 02H63JZ INSERTION OF PACEMAKER LEAD INTO RIGHT ATRIUM, PERCUTANEOUS APPROACH: ICD-10-PCS | Performed by: INTERNAL MEDICINE

## 2024-10-14 PROCEDURE — 25810000003 SODIUM CHLORIDE 0.9 % SOLUTION: Performed by: NURSE PRACTITIONER

## 2024-10-14 PROCEDURE — 82948 REAGENT STRIP/BLOOD GLUCOSE: CPT

## 2024-10-14 PROCEDURE — C1887 CATHETER, GUIDING: HCPCS | Performed by: INTERNAL MEDICINE

## 2024-10-14 PROCEDURE — 93010 ELECTROCARDIOGRAM REPORT: CPT | Performed by: INTERNAL MEDICINE

## 2024-10-14 PROCEDURE — 25010000002 VANCOMYCIN 10 G RECONSTITUTED SOLUTION: Performed by: NURSE PRACTITIONER

## 2024-10-14 PROCEDURE — 63710000001 INSULIN LISPRO (HUMAN) PER 5 UNITS: Performed by: NURSE PRACTITIONER

## 2024-10-14 PROCEDURE — 02HK3JZ INSERTION OF PACEMAKER LEAD INTO RIGHT VENTRICLE, PERCUTANEOUS APPROACH: ICD-10-PCS | Performed by: INTERNAL MEDICINE

## 2024-10-14 PROCEDURE — 25010000002 CEFAZOLIN PER 500 MG: Performed by: INTERNAL MEDICINE

## 2024-10-14 DEVICE — IMPLANTABLE DEVICE: Type: IMPLANTABLE DEVICE | Site: HEART | Status: FUNCTIONAL

## 2024-10-14 DEVICE — GEN PM AZURE XT SURESCAN DR MRI: Type: IMPLANTABLE DEVICE | Site: HEART | Status: FUNCTIONAL

## 2024-10-14 DEVICE — LD PM CAPSUREFIX NOVUS IS1 MEDTR 407652: Type: IMPLANTABLE DEVICE | Site: HEART | Status: FUNCTIONAL

## 2024-10-14 RX ORDER — SODIUM CHLORIDE 0.9 % (FLUSH) 0.9 %
10 SYRINGE (ML) INJECTION AS NEEDED
Status: DISCONTINUED | OUTPATIENT
Start: 2024-10-14 | End: 2024-10-15 | Stop reason: HOSPADM

## 2024-10-14 RX ORDER — LIDOCAINE HYDROCHLORIDE 10 MG/ML
INJECTION, SOLUTION INFILTRATION; PERINEURAL
Status: DISCONTINUED | OUTPATIENT
Start: 2024-10-14 | End: 2024-10-14 | Stop reason: HOSPADM

## 2024-10-14 RX ORDER — FENTANYL CITRATE 50 UG/ML
INJECTION, SOLUTION INTRAMUSCULAR; INTRAVENOUS
Status: DISCONTINUED | OUTPATIENT
Start: 2024-10-14 | End: 2024-10-14 | Stop reason: HOSPADM

## 2024-10-14 RX ORDER — VANCOMYCIN HYDROCHLORIDE 1 G/200ML
INJECTION, SOLUTION INTRAVENOUS
Status: COMPLETED | OUTPATIENT
Start: 2024-10-14 | End: 2024-10-14

## 2024-10-14 RX ORDER — ACETAMINOPHEN 160 MG/5ML
650 SOLUTION ORAL EVERY 4 HOURS PRN
Status: DISCONTINUED | OUTPATIENT
Start: 2024-10-14 | End: 2024-10-15 | Stop reason: HOSPADM

## 2024-10-14 RX ORDER — ACETAMINOPHEN 325 MG/1
650 TABLET ORAL EVERY 4 HOURS PRN
Status: DISCONTINUED | OUTPATIENT
Start: 2024-10-14 | End: 2024-10-15 | Stop reason: HOSPADM

## 2024-10-14 RX ORDER — MIDAZOLAM HYDROCHLORIDE 1 MG/ML
INJECTION INTRAMUSCULAR; INTRAVENOUS
Status: DISCONTINUED | OUTPATIENT
Start: 2024-10-14 | End: 2024-10-14 | Stop reason: HOSPADM

## 2024-10-14 RX ORDER — SODIUM CHLORIDE 0.9 % (FLUSH) 0.9 %
10 SYRINGE (ML) INJECTION EVERY 12 HOURS SCHEDULED
Status: DISCONTINUED | OUTPATIENT
Start: 2024-10-14 | End: 2024-10-15 | Stop reason: HOSPADM

## 2024-10-14 RX ORDER — ATORVASTATIN CALCIUM 20 MG/1
10 TABLET, FILM COATED ORAL NIGHTLY
Status: DISCONTINUED | OUTPATIENT
Start: 2024-10-15 | End: 2024-10-15 | Stop reason: HOSPADM

## 2024-10-14 RX ORDER — NITROGLYCERIN 0.4 MG/1
0.4 TABLET SUBLINGUAL
Status: DISCONTINUED | OUTPATIENT
Start: 2024-10-14 | End: 2024-10-15 | Stop reason: HOSPADM

## 2024-10-14 RX ORDER — METHOHEXITAL IN WATER/PF 100MG/10ML
SYRINGE (ML) INTRAVENOUS
Status: DISCONTINUED | OUTPATIENT
Start: 2024-10-14 | End: 2024-10-14 | Stop reason: HOSPADM

## 2024-10-14 RX ORDER — HYDROCODONE BITARTRATE AND ACETAMINOPHEN 5; 325 MG/1; MG/1
1 TABLET ORAL EVERY 6 HOURS PRN
Status: DISCONTINUED | OUTPATIENT
Start: 2024-10-14 | End: 2024-10-15 | Stop reason: HOSPADM

## 2024-10-14 RX ORDER — ACETAMINOPHEN 650 MG/1
650 SUPPOSITORY RECTAL EVERY 4 HOURS PRN
Status: DISCONTINUED | OUTPATIENT
Start: 2024-10-14 | End: 2024-10-15 | Stop reason: HOSPADM

## 2024-10-14 RX ORDER — VANCOMYCIN 2 GRAM/500 ML IN 0.9 % SODIUM CHLORIDE INTRAVENOUS
15 ONCE
Status: COMPLETED | OUTPATIENT
Start: 2024-10-14 | End: 2024-10-14

## 2024-10-14 RX ADMIN — HYDROCODONE BITARTRATE AND ACETAMINOPHEN 1 TABLET: 5; 325 TABLET ORAL at 18:41

## 2024-10-14 RX ADMIN — FAMOTIDINE 20 MG: 20 TABLET, FILM COATED ORAL at 20:14

## 2024-10-14 RX ADMIN — Medication 10 ML: at 20:16

## 2024-10-14 RX ADMIN — INSULIN LISPRO 3 UNITS: 100 INJECTION, SOLUTION INTRAVENOUS; SUBCUTANEOUS at 20:14

## 2024-10-14 RX ADMIN — ACETAMINOPHEN 325MG 650 MG: 325 TABLET ORAL at 22:41

## 2024-10-14 RX ADMIN — ATORVASTATIN CALCIUM 10 MG: 20 TABLET, FILM COATED ORAL at 08:36

## 2024-10-14 RX ADMIN — INSULIN LISPRO 2 UNITS: 100 INJECTION, SOLUTION INTRAVENOUS; SUBCUTANEOUS at 08:36

## 2024-10-14 RX ADMIN — VANCOMYCIN HYDROCHLORIDE 2000 MG: 500 INJECTION, POWDER, LYOPHILIZED, FOR SOLUTION INTRAVENOUS at 18:42

## 2024-10-14 NOTE — SIGNIFICANT NOTE
10/14/24 1200   OTHER   Discipline physical therapist   Rehab Time/Intention   Session Not Performed other (see comments)  (PT holding this date, pt continues with TVP. Note possible OR for pacemaker placement this PM. Will follow up post op as appropriate)   Recommendation   PT - Next Appointment 10/15/24

## 2024-10-14 NOTE — CASE MANAGEMENT/SOCIAL WORK
Discharge Planning Assessment  [facility]     Patient Name: Andrea Diaz  MRN: [id number]  Today's Date: 10/14/2024    Admit Date: 10/12/2024    Plan: Home   Discharge Needs Assessment       Row Name 10/14/24 1458       Living Environment    People in Home alone    Current Living Arrangements home    Potentially Unsafe Housing Conditions none    Primary Care Provided by self    Provides Primary Care For no one    Family Caregiver if Needed child(arvind), adult    Quality of Family Relationships helpful;involved;supportive       Resource/Environmental Concerns    Resource/Environmental Concerns none    Transportation Concerns none       Transition Planning    Patient/Family Anticipates Transition to home with family    Patient/Family Anticipated Services at Transition none    Transportation Anticipated family or friend will provide       Discharge Needs Assessment    Readmission Within the Last 30 Days no previous admission in last 30 days    Equipment Currently Used at Home none    Concerns to be Addressed no discharge needs identified;denies needs/concerns at this time    Anticipated Changes Related to Illness none    Equipment Needed After Discharge none                   Discharge Plan       Row Name 10/14/24 1458       Plan    Plan Home    Patient/Family in Agreement with Plan yes    Plan Comments CCP met with patient and patient's daughter, Hafsa at bedside. CCP role explained and discharge planning discussed. Face sheet verified and IMM noted. Patient's PCP is Dr. Daniel. Patient lives alone, with no steps to the entrance of his home. Patient's bedroom and bathroom are on the main level. Patient is independent with his ADLs and does not use DME. Patient has used [facility] in the past and has been to [facility] for rehab. Patient plans to return home and does not anticipate needing HH/SNF at discharge. Plans for pacemaker placement today. CCP will follow for any needs to arise. Christie SSOA                   Continued Care and Services - Admitted Since 10/12/2024    No active coordination exists for this encounter.       Expected Discharge Date and Time       Expected Discharge Date Expected Discharge Time    Oct 15, 2024            Demographic Summary       Row Name 10/14/24 1458       General Information    Admission Type inpatient    Arrived From emergency department    Required Notices Provided Important Message from Medicare    Referral Source admission list    Reason for Consult discharge planning    Preferred Language English                   Functional Status       Row Name 10/14/24 1458       Functional Status    Usual Activity Tolerance good    Current Activity Tolerance good       Functional Status, IADL    Medications independent    Meal Preparation independent    Housekeeping independent    Laundry independent    Shopping independent       Mental Status    General Appearance WDL WDL       Mental Status Summary    Recent Changes in Mental Status/Cognitive Functioning no changes                   Psychosocial    No documentation.                  Abuse/Neglect    No documentation.                  Legal    No documentation.                  Substance Abuse    No documentation.                  Patient Forms    No documentation.                     SHEILA Shoemaker

## 2024-10-14 NOTE — PROGRESS NOTES
[facility]  Cardiology Progress Note    Patient Identification:  Name: Andrea Diaz  Age: 69 y.o.  Sex: male  :  1955  MRN: [id number]                 Follow Up / Chief Complaint: Complete heart block    Interval History: Yesterday he had atypical chest pain for a few seconds.  No chest pain or shortness of breath now.  He states he is a little anxious for upcoming procedure             Objective:    Past Medical History:  Past Medical History:   Diagnosis Date    Essential (primary) hypertension     Other long term (current) drug therapy     Primary generalized (osteo)arthritis     Pure hypercholesterolemia     Type 2 diabetes mellitus without complication     Unilateral primary osteoarthritis, left knee      Past Surgical History:  Past Surgical History:   Procedure Laterality Date    COLONOSCOPY N/A 2017    Hyperplastic polyp, hemorrhoids    JOINT REPLACEMENT Right 2012    knee    KNEE ARTHROPLASTY Left 2015    KNEE SURGERY Right     meniscus        Social History:   Social History     Tobacco Use    Smoking status: Never     Passive exposure: Never    Smokeless tobacco: Never   Substance Use Topics    Alcohol use: Not Currently      Family History:  Family History   Problem Relation Age of Onset    Coronary artery disease Mother     Pancreatic cancer Father     Diabetes type II Father     Colon cancer Other           Allergies:  Allergies   Allergen Reactions    Metformin GI Intolerance     Scheduled Meds:  atorvastatin, 10 mg, Daily  famotidine, 20 mg, Nightly  insulin lispro, 2-7 Units, 4x Daily AC & at Bedtime            INTAKE AND OUTPUT:    Intake/Output Summary (Last 24 hours) at 10/14/2024 0942  Last data filed at 10/14/2024 0830  Gross per 24 hour   Intake 760 ml   Output 3365 ml   Net -2605 ml       Review of Systems:   GI: no n/v  Cardiac: no cp   Pulmonary: no shob    Constitutional:  Temp:  [97.4 °F (36.3 °C)-98.4 °F (36.9 °C)] 97.6 °F (36.4 °C)  Heart Rate:   [60-70] 70  Resp:  [12] 12  BP: (109-159)/(64-95) 124/91    Physical Exam:  General:  Appears in no acute distress, resting in bed  Eyes: eom normal no conjunctival drainage  HEENT:  No JVD. Thyroid not visibly enlarged. No mucosal pallor or cyanosis  Respiratory: Respirations regular and unlabored at rest. BBS with good air entry in all fields. No crackles, rubs or wheezes auscultated  Cardiovascular: S1S2 Regular rate and rhythm. No murmur, rub or gallop. No carotid bruits. DP/PT pulses     . No pretibial pitting edema  Gastrointestinal: Abdomen soft, flat, non tender. Bowel sounds present. No hepatosplenomegaly. No ascites  Skin:   Skin warm and dry to touch. No rashes    Neuro: AAO x3 CN II-XII grossly intact  Psych: Mood and affect normal, pleasant and cooperative          I reviewed the patient's new clinical results, and personally reviewed and interpreted the patient's ECG and telemetry data from the last 24 hours          Cardiographics  Telemetry:         ECG:     Echocardiogram:   7/9/24 at     Echocardiogram [employer]  Order: [id number]  Narrative    This result has an attachment that is not available.    Left Ventricle: The left ventricular systolic function is normal. The  LVEF is visually estimated at 55 - 60%. Unable to assess diastolic  function.    Right Ventricle: The right ventricular systolic function is normal.    Pericardium: No pericardial effusion.    Compared to the most recently available prior study, and allowing for  differences in image quality and technique, there is no significant  interval change noted.     10/12/24      Conclusion         Successful temporary pacemaker insertion     Procedure Narrative    Name of the procedure                                     temporary pacemaker insertion      Indication                                                          complete heart block      Procedure note                                                after preop medicine  "patient was brought to cardiac catheterization lab where the right supra clavicular area was cleaned and draped.  Right supraclavicular area and incised with a 2% lidocaine with Seldinger technique right internal jugular cannulated, temporary pacemaker was placed in the right apex area.  Patient tolerated procedure well        Conclusion                                                      successful temporary pacemaker insertion without any problems and complications     Lab Review   Results from last 7 days   Lab Units 10/12/24  2013 10/12/24  1819 10/12/24  1444   HSTROP T ng/L 20 15 16         Results from last 7 days   Lab Units 10/13/24  0353   SODIUM mmol/L 138   POTASSIUM mmol/L 3.9   BUN mg/dL 18   CREATININE mg/dL 1.07   CALCIUM mg/dL 9.0     @[id number]@  Results from last 7 days   Lab Units 10/13/24  0353 10/12/24  1444   WBC 10*3/mm3 9.33 9.43   HEMOGLOBIN g/dL 12.1* 12.9*   HEMATOCRIT % 38.2 41.6   PLATELETS 10*3/mm3 181 222             Assessment:  Complete heart block  Status post temporary pacemaker    Plan:  N.p.o.   Blood pressure and HR stable.  Temporary pacemaker placed on 10/12/24    Plan for permanent pacemaker placement with Dr Yang today.    )10/14/2024  ALEXANDER Morales/Transcription:   \"Dictated utilizing Dragon dictation\".     "

## 2024-10-14 NOTE — CONSULTS
Electrophysiology Hospital Consult            Patient Name: Andrea Diaz  Age/Sex: 69 y.o. male  : 1955  MRN: [id number]    Date of Admission: 10/12/2024  Date of Encounter Visit: 10/14/24  Encounter Provider: ALEXANDER Roberts  Referring Provider: Ilia Martinez MD  Place of Service: [facility] CARDIOLOGY  Patient Care Team:  Velasquez Daniel MD as PCP - General (Family Medicine)      Subjective:   EP Consultation for: AV block     Chief Complaint: near syncope, bradycardia      History of Present Illness:  Andrea Diaz is a 69 y.o. male with no prior cardiac history. He has a history of DM II, HTN, and syncope.    He was admitted at [facility] in July after a fall while hiking.     He was found confused by EMS. It was unclear if he had syncope as the event was not witnessed.     Subsequent workup was unremarkable. He did suffer subarachnoid hemorrhage but no intervention necessary. Neurosurgery saw him.     He followed up with his PCP in August and was doing well. Some short term memory issues but felt to be close to baseline.         He presented to ED over the weekend with near syncope and bradycardia.     Apparently he called a medical provider and was encouraged to go to the ED.     On arrival he was noted to be in complete AV block with rates in the 30-40s.     He was symptomatic with nausea, dizziness, and weakness.     He underwent temporary pacer placement with Dr. Pittman.     EP has been asked to see for permanent pacemaker.           Dr. Walton and I spoke with patient and family at bedside, his sister who is a nurse was with him.     He says he had episode back in July were he suffered a fall. He does not remember if he passed out and it was not a witnessed event.     He was doing well following that event until Saturday.     Says he noticed throughout the day he was more tired than usual. He had some episodes of dizziness as well.      His sister who is a nurse checked his HR and noted it to be in the 30s. They recommended he come to the ED for evaluation.     He currently feels well without complaints.     He is currently paced on the monitor but remains in complete AV block underneath pacemaker.         Past Medical History:  Past Medical History:   Diagnosis Date    Essential (primary) hypertension     Other long term (current) drug therapy     Primary generalized (osteo)arthritis     Pure hypercholesterolemia     Type 2 diabetes mellitus without complication     Unilateral primary osteoarthritis, left knee        Past Surgical History:   Procedure Laterality Date    COLONOSCOPY N/A 11/20/2017    Hyperplastic polyp, hemorrhoids    JOINT REPLACEMENT Right 08/2012    knee    KNEE ARTHROPLASTY Left 2015    KNEE SURGERY Right     meniscus       Home Medications:   Medications Prior to Admission   Medication Sig Dispense Refill Last Dose/Taking    carvedilol (COREG) 3.125 MG tablet Take 1 tablet by mouth 2 (Two) Times a Day With Meals. 180 tablet 3 10/11/2024    amLODIPine (NORVASC) 10 MG tablet Take 1 tablet by mouth Daily. 90 tablet 3     atorvastatin (LIPITOR) 10 MG tablet Take 1 tablet by mouth Daily. 90 tablet 3     Blood Glucose Monitoring Suppl (OneTouch Verio Flex System) w/Device kit See Admin Instructions.       cefdinir (OMNICEF) 300 MG capsule Take 1 capsule by mouth 2 (Two) Times a Day. 20 capsule 0     diclofenac (VOLTAREN) 75 MG EC tablet Take 1 tablet by mouth 2 (Two) Times a Day With Meals. 180 tablet 3     famotidine (PEPCID) 20 MG tablet Take 1 tablet by mouth every night at bedtime. 90 tablet 3     glucosamine sulfate 500 MG capsule capsule Take  by mouth 2 (Two) Times a Day With Meals.       glucose blood test strip Check blood sugar once daily  DX E11.9 100 each 3     glucose monitor monitoring kit Check blood sugar once daily  DX E11.9 1 each 0     Lancets Thin misc Check blood sugar once daily  DX E11.9 100 each 3      ondansetron (ZOFRAN) 4 MG tablet Take 1 tablet by mouth Every 8 (Eight) Hours As Needed.          Allergies:  Allergies   Allergen Reactions    Metformin GI Intolerance       Past Social History:  Social History     Socioeconomic History    Marital status:     Number of children: 2   Tobacco Use    Smoking status: Never     Passive exposure: Never    Smokeless tobacco: Never   Vaping Use    Vaping status: Never Used   Substance and Sexual Activity    Alcohol use: Not Currently    Drug use: No    Sexual activity: Defer       Past Family History:  Family History   Problem Relation Age of Onset    Coronary artery disease Mother     Pancreatic cancer Father     Diabetes type II Father     Colon cancer Other        Review of Systems: All systems reviewed. Pertinent positives identified in HPI. All other systems are negative.     14 point ROS was performed and is negative except as outlined in HPI.     Objective:     Objective:  Vital Signs (last 24 hours)         10/13 0700  10/14 0659 10/14 0700  10/14 0801   Most Recent      Temp (°F) 97.4 -  98.4       97.9 (36.6) 10/14 0000    Heart Rate 60 -  70       70 10/14 0600    /80 -  159/83       134/93 10/14 0600    SpO2 (%) 94 -  98       95 10/14 0600          Temp:  [97.4 °F (36.3 °C)-98.4 °F (36.9 °C)] 97.9 °F (36.6 °C)  Heart Rate:  [60-70] 70  BP: (101-159)/(64-95) 134/93  Body mass index is 42.81 kg/m².        Physical Exam:     General Appearance: No acute distress, well developed and well nourished.   Eyes: Conjunctiva and lids: No erythema, swelling, or discharge. Sclera non-icteric.   HENT: Atraumatic, normocephalic. External eyes, ears, and nose normal.   Respiratory: No signs of respiratory distress. Respiration rhythm and depth normal.   Clear to auscultation. No rales, crackles, rhonchi, or wheezing auscultated.   Cardiovascular:  Jugular Venous Pressure: Normal  Heart Rate and Rhythm: Normal, Heart Sounds: Normal S1 and S2. No S3 or S4  noted.  Murmurs: No murmurs noted. No rubs, thrills, or gallops.   Arterial Pulses: Posterior tibialis and dorsalis pedis pulses normal.   Lower Extremities: No edema noted.  Gastrointestinal:  Abdomen soft, non-distended, non-tender.  Musculoskeletal: Normal movement of extremities  Skin: Warm and dry.   Psychiatric: Patient alert and oriented to person, place, and time. Speech and behavior appropriate. Normal mood and affect.    Labs:   Lab Review:     Results from last 7 days   Lab Units 10/13/24  0353 10/12/24  1444   SODIUM mmol/L 138 137   POTASSIUM mmol/L 3.9 4.5   CHLORIDE mmol/L 102 102   CO2 mmol/L 24.2 22.2   BUN mg/dL 18 21   CREATININE mg/dL 1.07 1.07   GLUCOSE mg/dL 170* 175*   CALCIUM mg/dL 9.0 9.1   AST (SGOT) U/L  --  24   ALT (SGPT) U/L  --  37     Results from last 7 days   Lab Units 10/12/24  2013 10/12/24  1819 10/12/24  1444   HSTROP T ng/L 20 15 16     Results from last 7 days   Lab Units 10/13/24  0353   WBC 10*3/mm3 9.33   HEMOGLOBIN g/dL 12.1*   HEMATOCRIT % 38.2   PLATELETS 10*3/mm3 181                                   EKG:           I personally viewed and interpreted the patient's EKG/Telemetry tracings.    Assessment:       Third degree heart block    Type 2 diabetes mellitus without complication, without long-term current use of insulin    Pure hypercholesterolemia    Essential hypertension    GERD without esophagitis        Plan:   Dr. Yang and I saw this patient.       He had fall in July with possible syncope at that time. He presented this weekend with near syncope and was in complete AV block. He remains in AV block this morning requiring temporary pacing.     We recommended proceeding with permanent pacemaker placement. We discussed the risks, benefits, and alternatives.     Patient is agreeable to proceed.         Thank you for allowing me to participate in the care of Andrea Diaz. Feel free to contact me directly with any further questions or concerns.    Prabhjot  ALEXANDER Borden  [facility]  10/14/24  08:01 EDT

## 2024-10-14 NOTE — PLAN OF CARE
Goal Outcome Evaluation:            Pt from CL w/ PM placed. TVP remains in R-IJ, fluids per protocol. L-incision soft, no  drainage, c/o of mild tenderness. X1 oxy given. VSS, family updated at bedside.

## 2024-10-14 NOTE — PROGRESS NOTES
Name: Andrea Diaz ADMIT: 10/12/2024   : 1955  PCP: Velasquez Daniel MD    MRN: [id number] LOS: 2 days   AGE/SEX: 69 y.o. male  ROOM: [facility]/     Subjective   Subjective   He feels pretty good.  No dizziness or chest pain this morning       Objective   Objective   Vital Signs  Temp:  [97.4 °F (36.3 °C)-98.4 °F (36.9 °C)] 97.6 °F (36.4 °C)  Heart Rate:  [60-70] 70  Resp:  [12] 12  BP: (109-159)/(64-95) 142/90  SpO2:  [94 %-99 %] 99 %  on   ;   Device (Oxygen Therapy): room air  Body mass index is 42.81 kg/m².  Physical Exam  Vitals reviewed.   Constitutional:       General: He is not in acute distress.     Appearance: He is obese. He is not ill-appearing.   Cardiovascular:      Rate and Rhythm: Normal rate and regular rhythm.   Pulmonary:      Effort: Pulmonary effort is normal. No respiratory distress.   Skin:     General: Skin is warm and dry.   Neurological:      Mental Status: He is alert and oriented to person, place, and time.   Psychiatric:         Mood and Affect: Mood normal.       Results Review     I reviewed the patient's new clinical results.  Results from last 7 days   Lab Units 10/13/24  0353 10/12/24  1444   WBC 10*3/mm3 9.33 9.43   HEMOGLOBIN g/dL 12.1* 12.9*   PLATELETS 10*3/mm3 181 222     Results from last 7 days   Lab Units 10/13/24  0353 10/12/24  1444   SODIUM mmol/L 138 137   POTASSIUM mmol/L 3.9 4.5   CHLORIDE mmol/L 102 102   CO2 mmol/L 24.2 22.2   BUN mg/dL 18 21   CREATININE mg/dL 1.07 1.07   GLUCOSE mg/dL 170* 175*   EGFR mL/min/1.73 74.2 74.2     Results from last 7 days   Lab Units 10/12/24  1444   ALBUMIN g/dL 4.0   BILIRUBIN mg/dL 0.2   ALK PHOS U/L 89   AST (SGOT) U/L 24   ALT (SGPT) U/L 37     Results from last 7 days   Lab Units 10/13/24  0353 10/12/24  1444   CALCIUM mg/dL 9.0 9.1   ALBUMIN g/dL  --  4.0       Glucose   Date/Time Value Ref Range Status   10/14/2024 0819 153 (H) 70 - 130 mg/dL Final   10/13/2024 2018 136 (H) 70 - 130 mg/dL Final   10/13/2024  1706 172 (H) 70 - 130 mg/dL Final   10/13/2024 1143 208 (H) 70 - 130 mg/dL Final   10/12/2024 1606 137 (H) 70 - 130 mg/dL Final       XR Chest 1 View    Result Date: 10/13/2024  No acute cardiopulmonary process  This report was finalized on 10/13/2024 4:59 PM by Dr. Humberto Mckeon M.D on Workstation: [id number]       I have personally reviewed all medications:  Scheduled Medications  [START ON 10/15/2024] atorvastatin, 10 mg, Oral, Nightly  famotidine, 20 mg, Oral, Nightly  insulin lispro, 2-7 Units, Subcutaneous, 4x Daily AC & at Bedtime    Infusions   Diet  NPO Diet NPO Type: Strict NPO    I have personally reviewed:  [x]  Laboratory   []  Microbiology   []  Radiology   [x]  EKG/Telemetry  [x]  Cardiology/Vascular   []  Pathology    []  Records       Assessment/Plan     Active Hospital Problems    Diagnosis  POA    **Third degree heart block [I44.2]  Yes    GERD without esophagitis [K21.9]  Yes    AV block, complete [I44.2]  Unknown    Type 2 diabetes mellitus without complication, without long-term current use of insulin [E11.9]  Yes    Essential hypertension [I10]  Yes    Pure hypercholesterolemia [E78.00]  Yes      Resolved Hospital Problems   No resolved problems to display.       69 y.o. male admitted with Third degree heart block.    Planning for permanent pacemaker placement this afternoon.  Beta-blocker remains on hold    HTN: BP mildly elevated but acceptable.  Holding amlodipine as well as Coreg until pacemaker in place.    DM2: Patient states is diet controlled normally.  A1c was 7 in August.  Correctional insulin available as needed here.    Continue famotidine for GERD      SCDs  Dispo: Likely home tomorrow if all goes well with pacer implantation      Jarrod Sargent MD  [facility]  10/14/24  10:35 EDT

## 2024-10-14 NOTE — PLAN OF CARE
Goal Outcome Evaluation:      Pt remains in CICU with TVP in place. NPO since midnight d/t possible permanent pacer today.

## 2024-10-15 ENCOUNTER — READMISSION MANAGEMENT (OUTPATIENT)
Dept: CALL CENTER | Facility: HOSPITAL | Age: 69
End: 2024-10-15
Payer: MEDICARE

## 2024-10-15 VITALS
HEART RATE: 88 BPM | BODY MASS INDEX: 39.83 KG/M2 | DIASTOLIC BLOOD PRESSURE: 77 MMHG | RESPIRATION RATE: 18 BRPM | SYSTOLIC BLOOD PRESSURE: 136 MMHG | HEIGHT: 68 IN | WEIGHT: 262.79 LBS | OXYGEN SATURATION: 99 % | TEMPERATURE: 98.3 F

## 2024-10-15 LAB
ALBUMIN SERPL-MCNC: 3.4 G/DL (ref 3.5–5.2)
ALBUMIN SERPL-MCNC: 3.4 G/DL (ref 3.5–5.2)
ALBUMIN/GLOB SERPL: 1 G/DL
ALBUMIN/GLOB SERPL: 1.1 G/DL
ALP SERPL-CCNC: 87 U/L (ref 39–117)
ALP SERPL-CCNC: 91 U/L (ref 39–117)
ALT SERPL W P-5'-P-CCNC: 22 U/L (ref 1–41)
ALT SERPL W P-5'-P-CCNC: 23 U/L (ref 1–41)
ANION GAP SERPL CALCULATED.3IONS-SCNC: 10 MMOL/L (ref 5–15)
ANION GAP SERPL CALCULATED.3IONS-SCNC: 10.1 MMOL/L (ref 5–15)
AST SERPL-CCNC: 11 U/L (ref 1–40)
AST SERPL-CCNC: 14 U/L (ref 1–40)
BILIRUB SERPL-MCNC: 0.6 MG/DL (ref 0–1.2)
BILIRUB SERPL-MCNC: 0.8 MG/DL (ref 0–1.2)
BUN SERPL-MCNC: 14 MG/DL (ref 8–23)
BUN SERPL-MCNC: 16 MG/DL (ref 8–23)
BUN/CREAT SERPL: 13.7 (ref 7–25)
BUN/CREAT SERPL: 15.7 (ref 7–25)
CALCIUM SPEC-SCNC: 8.6 MG/DL (ref 8.6–10.5)
CALCIUM SPEC-SCNC: 8.6 MG/DL (ref 8.6–10.5)
CHLORIDE SERPL-SCNC: 102 MMOL/L (ref 98–107)
CHLORIDE SERPL-SCNC: 102 MMOL/L (ref 98–107)
CO2 SERPL-SCNC: 22 MMOL/L (ref 22–29)
CO2 SERPL-SCNC: 24.9 MMOL/L (ref 22–29)
CREAT SERPL-MCNC: 1.02 MG/DL (ref 0.76–1.27)
CREAT SERPL-MCNC: 1.02 MG/DL (ref 0.76–1.27)
DEPRECATED RDW RBC AUTO: 39.1 FL (ref 37–54)
DEPRECATED RDW RBC AUTO: 41.2 FL (ref 37–54)
EGFRCR SERPLBLD CKD-EPI 2021: 78.6 ML/MIN/1.73
EGFRCR SERPLBLD CKD-EPI 2021: 78.6 ML/MIN/1.73
ERYTHROCYTE [DISTWIDTH] IN BLOOD BY AUTOMATED COUNT: 12 % (ref 12.3–15.4)
ERYTHROCYTE [DISTWIDTH] IN BLOOD BY AUTOMATED COUNT: 12.3 % (ref 12.3–15.4)
GLOBULIN UR ELPH-MCNC: 3 GM/DL
GLOBULIN UR ELPH-MCNC: 3.3 GM/DL
GLUCOSE BLDC GLUCOMTR-MCNC: 153 MG/DL (ref 70–130)
GLUCOSE BLDC GLUCOMTR-MCNC: 182 MG/DL (ref 70–130)
GLUCOSE SERPL-MCNC: 129 MG/DL (ref 65–99)
GLUCOSE SERPL-MCNC: 150 MG/DL (ref 65–99)
HCT VFR BLD AUTO: 38 % (ref 37.5–51)
HCT VFR BLD AUTO: 40.2 % (ref 37.5–51)
HGB BLD-MCNC: 13.1 G/DL (ref 13–17.7)
HGB BLD-MCNC: 13.4 G/DL (ref 13–17.7)
MAGNESIUM SERPL-MCNC: 1.6 MG/DL (ref 1.6–2.4)
MAGNESIUM SERPL-MCNC: 1.7 MG/DL (ref 1.6–2.4)
MCH RBC QN AUTO: 29.9 PG (ref 26.6–33)
MCH RBC QN AUTO: 31.6 PG (ref 26.6–33)
MCHC RBC AUTO-ENTMCNC: 33.3 G/DL (ref 31.5–35.7)
MCHC RBC AUTO-ENTMCNC: 34.5 G/DL (ref 31.5–35.7)
MCV RBC AUTO: 89.7 FL (ref 79–97)
MCV RBC AUTO: 91.8 FL (ref 79–97)
PLATELET # BLD AUTO: 177 10*3/MM3 (ref 140–450)
PLATELET # BLD AUTO: 196 10*3/MM3 (ref 140–450)
PMV BLD AUTO: 10.9 FL (ref 6–12)
PMV BLD AUTO: 11.1 FL (ref 6–12)
POTASSIUM SERPL-SCNC: 3.7 MMOL/L (ref 3.5–5.2)
POTASSIUM SERPL-SCNC: 3.8 MMOL/L (ref 3.5–5.2)
PROT SERPL-MCNC: 6.4 G/DL (ref 6–8.5)
PROT SERPL-MCNC: 6.7 G/DL (ref 6–8.5)
QT INTERVAL: 392 MS
QT INTERVAL: 405 MS
QTC INTERVAL: 434 MS
QTC INTERVAL: 469 MS
RBC # BLD AUTO: 4.14 10*6/MM3 (ref 4.14–5.8)
RBC # BLD AUTO: 4.48 10*6/MM3 (ref 4.14–5.8)
SODIUM SERPL-SCNC: 134 MMOL/L (ref 136–145)
SODIUM SERPL-SCNC: 137 MMOL/L (ref 136–145)
WBC NRBC COR # BLD AUTO: 8.14 10*3/MM3 (ref 3.4–10.8)
WBC NRBC COR # BLD AUTO: 8.49 10*3/MM3 (ref 3.4–10.8)

## 2024-10-15 PROCEDURE — 63710000001 INSULIN LISPRO (HUMAN) PER 5 UNITS: Performed by: NURSE PRACTITIONER

## 2024-10-15 PROCEDURE — 99238 HOSP IP/OBS DSCHRG MGMT 30/<: CPT | Performed by: NURSE PRACTITIONER

## 2024-10-15 PROCEDURE — 80053 COMPREHEN METABOLIC PANEL: CPT | Performed by: INTERNAL MEDICINE

## 2024-10-15 PROCEDURE — 83735 ASSAY OF MAGNESIUM: CPT | Performed by: INTERNAL MEDICINE

## 2024-10-15 PROCEDURE — 82948 REAGENT STRIP/BLOOD GLUCOSE: CPT

## 2024-10-15 PROCEDURE — 85027 COMPLETE CBC AUTOMATED: CPT | Performed by: INTERNAL MEDICINE

## 2024-10-15 PROCEDURE — 93005 ELECTROCARDIOGRAM TRACING: CPT | Performed by: NURSE PRACTITIONER

## 2024-10-15 PROCEDURE — 25010000002 MAGNESIUM SULFATE 2 GM/50ML SOLUTION: Performed by: INTERNAL MEDICINE

## 2024-10-15 PROCEDURE — 93010 ELECTROCARDIOGRAM REPORT: CPT | Performed by: INTERNAL MEDICINE

## 2024-10-15 RX ORDER — MAGNESIUM SULFATE HEPTAHYDRATE 40 MG/ML
2 INJECTION, SOLUTION INTRAVENOUS ONCE
Status: COMPLETED | OUTPATIENT
Start: 2024-10-15 | End: 2024-10-15

## 2024-10-15 RX ADMIN — MAGNESIUM SULFATE HEPTAHYDRATE 2 G: 40 INJECTION, SOLUTION INTRAVENOUS at 05:50

## 2024-10-15 RX ADMIN — INSULIN LISPRO 2 UNITS: 100 INJECTION, SOLUTION INTRAVENOUS; SUBCUTANEOUS at 12:17

## 2024-10-15 RX ADMIN — INSULIN LISPRO 2 UNITS: 100 INJECTION, SOLUTION INTRAVENOUS; SUBCUTANEOUS at 08:52

## 2024-10-15 RX ADMIN — Medication 10 ML: at 08:53

## 2024-10-15 RX ADMIN — ACETAMINOPHEN 325MG 650 MG: 325 TABLET ORAL at 04:44

## 2024-10-15 RX ADMIN — ACETAMINOPHEN 325MG 650 MG: 325 TABLET ORAL at 14:15

## 2024-10-15 NOTE — PROGRESS NOTES
Name: Andrea Diaz ADMIT: 10/12/2024   : 1955  PCP: Velasquez Daniel MD    MRN: [id number] LOS: 3 days   AGE/SEX: 69 y.o. male  ROOM: [facility]/     Subjective   Subjective   He feels well and ready for dc       Objective   Objective   Vital Signs  Temp:  [98 °F (36.7 °C)-99 °F (37.2 °C)] 98.3 °F (36.8 °C)  Heart Rate:  [] 88  Resp:  [14-18] 18  BP: (121-161)/(60-99) 136/77  SpO2:  [93 %-100 %] 99 %  on   ;   Device (Oxygen Therapy): room air  Body mass index is 39.96 kg/m².  Physical Exam  Vitals reviewed.   Constitutional:       General: He is not in acute distress.     Appearance: He is obese. He is not ill-appearing.   Cardiovascular:      Rate and Rhythm: Normal rate and regular rhythm.   Pulmonary:      Effort: Pulmonary effort is normal. No respiratory distress.   Skin:     General: Skin is warm and dry.   Neurological:      Mental Status: He is alert and oriented to person, place, and time.   Psychiatric:         Mood and Affect: Mood normal.       Results Review     I reviewed the patient's new clinical results.  Results from last 7 days   Lab Units 10/15/24  0403 10/15/24  0040 10/13/24  0353 10/12/24  1444   WBC 10*3/mm3 8.14 8.49 9.33 9.43   HEMOGLOBIN g/dL 13.1 13.4 12.1* 12.9*   PLATELETS 10*3/mm3 177 196 181 222     Results from last 7 days   Lab Units 10/15/24  0403 10/15/24  0040 10/13/24  0353 10/12/24  1444   SODIUM mmol/L 137 134* 138 137   POTASSIUM mmol/L 3.8 3.7 3.9 4.5   CHLORIDE mmol/L 102 102 102 102   CO2 mmol/L 24.9 22.0 24.2 22.2   BUN mg/dL 14 16 18 21   CREATININE mg/dL 1.02 1.02 1.07 1.07   GLUCOSE mg/dL 129* 150* 170* 175*   EGFR mL/min/1.73 78.6 78.6 74.2 74.2     Results from last 7 days   Lab Units 10/15/24  0403 10/15/24  0040 10/12/24  1444   ALBUMIN g/dL 3.4* 3.4* 4.0   BILIRUBIN mg/dL 0.8 0.6 0.2   ALK PHOS U/L 87 91 89   AST (SGOT) U/L 11 14 24   ALT (SGPT) U/L 22 23 37     Results from last 7 days   Lab Units 10/15/24  0403 10/15/24  004  10/13/24  0353 10/12/24  1444   CALCIUM mg/dL 8.6 8.6 9.0 9.1   ALBUMIN g/dL 3.4* 3.4*  --  4.0   MAGNESIUM mg/dL 1.6 1.7  --   --        Glucose   Date/Time Value Ref Range Status   10/15/2024 1119 182 (H) 70 - 130 mg/dL Final   10/15/2024 0746 153 (H) 70 - 130 mg/dL Final   10/14/2024 2006 215 (H) 70 - 130 mg/dL Final   10/14/2024 1825 112 70 - 130 mg/dL Final   10/14/2024 1226 118 70 - 130 mg/dL Final   10/14/2024 0819 153 (H) 70 - 130 mg/dL Final   10/13/2024 2018 136 (H) 70 - 130 mg/dL Final       No radiology results for the last day    I have personally reviewed all medications:  Scheduled Medications    Infusions  No current facility-administered medications for this encounter.  Diet  No diet orders on file    I have personally reviewed:  [x]  Laboratory   []  Microbiology   []  Radiology   [x]  EKG/Telemetry  [x]  Cardiology/Vascular   []  Pathology    []  Records       Assessment/Plan     Active Hospital Problems    Diagnosis  POA    **Third degree heart block [I44.2]  Yes    GERD without esophagitis [K21.9]  Yes    AV block, complete [I44.2]  Unknown    Type 2 diabetes mellitus without complication, without long-term current use of insulin [E11.9]  Yes    Essential hypertension [I10]  Yes    Pure hypercholesterolemia [E78.00]  Yes      Resolved Hospital Problems   No resolved problems to display.       69 y.o. male admitted with Third degree heart block.  Status post PPM    Doing well postprocedure.  Pacer appears to be functioning normally.    HTN: May resume amlodipine at discharge.      DM2: Patient states is diet controlled normally.  A1c was 7 in August.  He can continue dietary measures at home and follow-up with PCP after discharge    Continue famotidine for GERD      SCDs  Dispo: Okay for DC from my POV.  Discussed with RN and with cardiology ALEXANDER Sargent MD  [facility]  10/15/24  17:08 EDT

## 2024-10-15 NOTE — PLAN OF CARE
Goal Outcome Evaluation:  Plan of Care Reviewed With: patient, friend        Progress: no change  Outcome Evaluation: pt remains in CICU, VSS. pt on room air. A couple of 6 beat runs, cardiology MD aware. Permanent pacemaker in 10/14. Paced normal sinus rhythm. Temp pacemaker in place with box at bedside, but is not connected. Permanent pacemaker site is clean and dry. Replacing mag. Over 1L out via urinal. Friend updated at bedside.

## 2024-10-15 NOTE — CASE MANAGEMENT/SOCIAL WORK
Case Management Discharge Note      Final Note: Pt DC home, no needs. TED [facility]/CCP    Selected Continued Care - Discharged on 10/15/2024 Admission date: 10/12/2024 - Discharge disposition: Home or Self Care     Final Discharge Disposition Code: 01 - home or self-care

## 2024-10-15 NOTE — OUTREACH NOTE
Prep Survey      Flowsheet Row Responses   [facility] facility patient discharged from? [place]   Is LACE score < 7 ? No   Eligibility [facility]   Date of Admission 10/12/24   Date of Discharge 10/15/24   Discharge Disposition Home or Self Care   Discharge diagnosis third degree heart block-Pacemaker this visit   Does the patient have one of the following disease processes/diagnoses(primary or secondary)? General Surgery   Does the patient have Home health ordered? No   Is there a DME ordered? No   Prep survey completed? Yes            BETO GARCIA - Registered Nurse

## 2024-10-15 NOTE — SIGNIFICANT NOTE
10/15/24 1417   OTHER   Discipline physical therapist   Rehab Time/Intention   Session Not Performed other (see comments)  (spoke with RN - pt plans home today, has been ambulatory with staff all day. no acute PT needs)   Therapy Assessment/Plan (PT)   Criteria for Skilled Interventions Met (PT) no problems identified which require skilled intervention

## 2024-10-15 NOTE — DISCHARGE SUMMARY
[facility]  Physician Discharge Summary    Patient Identification:  Name: Andrea Diaz  Age: 69 y.o.  Sex: male  :  1955  MRN: [id number]    Admit date: 10/12/2024    Discharge date and time: 10/15/2024 at 1443      Admitting Physician: Arnoldo Pittman MD     Discharge Physician: ALEXANDER Morales      Discharge Diagnoses:   Active Hospital Problems    Diagnosis     **Third degree heart block     GERD without esophagitis     AV block, complete     Type 2 diabetes mellitus without complication, without long-term current use of insulin     Essential hypertension     Pure hypercholesterolemia        Discharged Condition: stable    Hospital Course:     Andrea Diaz is a 69-year-old male who is here today is being discharged in stable condition.  He presented to [facility] on the  with dizziness, near syncope and lightheadedness.  On admission he was noted to have third-degree heart block.  He was asymptomatic at the time.  Beta-blockers were held and temporary pacemaker placed.  EP consult due to complete AV heart block and a permanent pacemaker was placed on 10/14/2024.  Left subclavian incision with clean, dry and intact dressing no bleeding or hematoma noticed.  Device was checked prior to discharge and it was normal.  EKG stable.  Nurse worked with him prior to discharge on mobilization.  All providers okay with discharge.  Discharge instructions as below.        Consults:   IP CONSULT TO INTERVENTIONAL CARDIOLOGY  IP CONSULT TO HOSPITALIST  IP CONSULT TO CARDIAC ELECTROPHYSIOLOGIST    Discharge Exam:  General: No acute distress, site clean dry and intact   Skin: Warm and dry, no diaphoresis noted   EYES: OM normal and no conjunctival drainage   HEENT: external ear and nose normal; oral mucosa moist   Neck: Supple; no carotid bruits; no JVD   Heart: S1S2 regular rate and rhythm; no murmurs; no gallop or rub appreciated   Pulmonary: Respirations  regular, unlabored at rest, bilateral breath sounds have good air entry throughout all lung fields; no crackles, rubs or wheezes auscultated.     GI: Soft, non-tender, non-distended, positive bowel sounds  No hepatosplenomegaly   Extremities: Bilateral lower extremities have no pre-tibial pitting edema; DP/PT pulses are palpable   Neurological: Alert and oriented x 3; no neuro deficits          LABS:  Results from last 7 days   Lab Units 10/12/24  2013 10/12/24  1819 10/12/24  1444   HSTROP T ng/L 20 15 16     Results from last 7 days   Lab Units 10/15/24  0403   MAGNESIUM mg/dL 1.6     Results from last 7 days   Lab Units 10/15/24  0403   SODIUM mmol/L 137   POTASSIUM mmol/L 3.8   BUN mg/dL 14   CREATININE mg/dL 1.02   CALCIUM mg/dL 8.6     @LABRCNTbnp@  Results from last 7 days   Lab Units 10/15/24  0403 10/15/24  0040 10/13/24  0353   WBC 10*3/mm3 8.14 8.49 9.33   HEMOGLOBIN g/dL 13.1 13.4 12.1*   HEMATOCRIT % 38.0 40.2 38.2   PLATELETS 10*3/mm3 177 196 181             Disposition:  Home    Discharge Medications:      Discharge Medications        Continue These Medications        Instructions Start Date   amLODIPine 10 MG tablet  Commonly known as: NORVASC   10 mg, Oral, Daily      atorvastatin 10 MG tablet  Commonly known as: LIPITOR   10 mg, Oral, Daily      carvedilol 3.125 MG tablet  Commonly known as: COREG   3.125 mg, Oral, 2 Times Daily With Meals      diclofenac 75 MG EC tablet  Commonly known as: VOLTAREN   75 mg, Oral, 2 Times Daily With Meals      famotidine 20 MG tablet  Commonly known as: PEPCID   20 mg, Oral, Every Night at Bedtime      glucosamine sulfate 500 MG capsule capsule   Oral, 2 Times Daily With Meals      glucose blood test strip   Check blood sugar once daily  DX E11.9      glucose monitor monitoring kit   Check blood sugar once daily  DX E11.9      Lancets Thin misc   Check blood sugar once daily  DX E11.9      ondansetron 4 MG tablet  Commonly known as: ZOFRAN   4 mg, Oral, Every 8  "Hours PRN      OneTouch Verio Flex System w/Device kit   See Admin Instructions             Stop These Medications      cefdinir 300 MG capsule  Commonly known as: OMNICEF                Discharge Home Instructions:   1. Please follow-up with ALEXANDER Jeffery on October 29 at 2 pm  2.  Follow-up with your primary care physician in 1 week.  Please call for an appointment.  3.  ALEXANDER Wallis office will call and set up an appointment with Dr. Walton's office  4.  Take all medications as prescribed. Monitor blood pressure 1 hour and a half after taking blood pressure medications and  write the reading down along with heart rate.  Please bring these readings with you on your follow up.     5. The importance of taking dual antiplatelets for one year  has been explained, risk of stent thrombosis leading to the acute MI, which carries high morbidity and mortality has been explained. Discontinuation or interruptions of these medications should be under the strict guidance of appropriate health professional.    Routine post pacemaker/AICD/loop recorder implant discharge home care instructions:  1. Do not get site wet for 72 hours.  2. Do not submerge device site below water for 7-10 days.  3. No lifting left/right upper extremity above head.  4. No lifting objects greater than 1 pound with affected extremity.  5. No driving until cleared by cardiologist at follow up appointment.   6. Wear sling until cleared by cardiologist.  7. Call office at [phone] for any fevers, chills, redness,bleeding, drainage, increased pain, etc.   8. Follow up in the pacemaker clinic as scheduled.        Signed:  ALEXANDER Morales  10/15/2024  14:41 EDT      EMR Dragon/Transcription:   \"Dictated utilizing Dragon dictation\".         "

## 2024-10-15 NOTE — PROGRESS NOTES
Left subclavian incision with C/D/I dressing--no bleeding, hematoma, redness or drainage.     Device check this morning within normal limits and EKG looks great.     Discussed activity restrictions and follow up.     Ok to go home from EP standpoint if okay with primary team--discussed with ALEXANDER Martinez.     Temp wire removed without difficulty and RN to remove cordis. Encouraged to get up and ambulate, up in chair to be sure does okay.    Will have office call him for incision check in 7-10 days.

## 2024-10-16 ENCOUNTER — TRANSITIONAL CARE MANAGEMENT TELEPHONE ENCOUNTER (OUTPATIENT)
Dept: CALL CENTER | Facility: HOSPITAL | Age: 69
End: 2024-10-16
Payer: MEDICARE

## 2024-10-16 NOTE — OUTREACH NOTE
"Call Center TCM Note      Flowsheet Row Responses   [facility] patient discharged from? [place]   Does the patient have one of the following disease processes/diagnoses(primary or secondary)? General Surgery   TCM attempt successful? Yes  [no names listed on verbal release]   Call start time 1024   Call end time 1035   Discharge diagnosis third degree heart block-Pacemaker this visit   Meds reviewed with patient/caregiver? Yes   Does the patient have all medications related to this admission filled (includes all antibiotics, pain medications, etc.) N/A   Prescription comments no changes in medications   Is the patient taking all medications as directed (includes completed medication regime)? Yes   Comments Pt prefers to keep appt with PCP on 11/12/24 and will see cardio on 10/23/24 for wound check and cardio f/u on 10/29/24@200pm   Does the patient have an appointment with their PCP within 7-14 days of discharge? No   Nursing Interventions Patient desires to follow up with specialty only, Routed TCM call to PCP office   Has home health visited the patient within 72 hours of discharge? N/A   Psychosocial issues? No   Did the patient receive a copy of their discharge instructions? Yes   Nursing interventions Reviewed instructions with patient   What is the patient's perception of their health status since discharge? Improving  [pt is doing well today and reports he \"feels great\" today.  Aware to monitor for any cardiac type s/s.]   Nursing interventions Nurse provided patient education  [reviewed post op instructions, pt aware of lifting restrictions and to keep dressing intact, clean and dry.]   Is the patient /caregiver able to teach back basic post-op care? Lifting as instructed by MD in discharge instructions, Keep incision areas clean,dry and protected, No tub bath, swimming, or hot tub until instructed by MD, Drive as instructed by MD in discharge instructions   Is the patient/caregiver able to teach " back signs and symptoms of incisional infection? Increased redness, swelling or pain at the incisonal site, Increased drainage or bleeding, Incisional warmth, Pus or odor from incision, Fever  [reviewed--denies issues with site]   Is the patient/caregiver able to teach back steps to recovery at home? Rest and rebuild strength, gradually increase activity   Is the patient/caregiver able to teach back the hierarchy of who to call/visit for symptoms/problems? PCP, Specialist, Home health nurse, Urgent Care, ED, 911 Yes   Additional teach back comments Pt reports his S.O. is a nurse who is assisting with care   TCM call completed? Yes   Call end time 1035            Nita Low RN    10/16/2024, 10:38 EDT

## 2024-10-23 ENCOUNTER — CLINICAL SUPPORT NO REQUIREMENTS (OUTPATIENT)
Age: 69
End: 2024-10-23
Payer: MEDICARE

## 2024-10-23 DIAGNOSIS — I44.2 AV BLOCK, COMPLETE: Primary | ICD-10-CM

## 2024-10-25 ENCOUNTER — READMISSION MANAGEMENT (OUTPATIENT)
Dept: CALL CENTER | Facility: HOSPITAL | Age: 69
End: 2024-10-25
Payer: MEDICARE

## 2024-10-25 NOTE — OUTREACH NOTE
General Surgery Week 2 Survey      Flowsheet Row Responses   [facility] patient discharged from? [place]   Does the patient have one of the following disease processes/diagnoses(primary or secondary)? General Surgery   Week 2 attempt successful? Yes   Call start time 1324   Call end time 1326   Discharge diagnosis third degree heart block-Pacemaker this visit   Meds reviewed with patient/caregiver? Yes   Is the patient having any side effects they believe may be caused by any medication additions or changes? No   Does the patient have all medications related to this admission filled (includes all antibiotics, pain medications, etc.) N/A   Is the patient taking all medications as directed (includes completed medication regime)? Yes   Does the patient have a follow up appointment scheduled with their surgeon? Yes   Has the patient kept scheduled appointments due by today? Yes   Has home health visited the patient within 72 hours of discharge? N/A   Psychosocial issues? No   Did the patient receive a copy of their discharge instructions? Yes   Nursing interventions Reviewed instructions with patient   What is the patient's perception of their health status since discharge? Improving   Nursing interventions Nurse provided patient education   Is the patient /caregiver able to teach back basic post-op care? No tub bath, swimming, or hot tub until instructed by MD, Keep incision areas clean,dry and protected, Do not remove steri-strips, Lifting as instructed by MD in discharge instructions, Take showers only when approved by MD-sponge bathe until then   Is the patient/caregiver able to teach back signs and symptoms of incisional infection? Increased redness, swelling or pain at the incisonal site, Increased drainage or bleeding, Incisional warmth, Pus or odor from incision, Fever   Is the patient/caregiver able to teach back steps to recovery at home? Set small, achievable goals for return to baseline health, Rest  and rebuild strength, gradually increase activity, Eat a well-balance diet   If the patient is a current smoker, are they able to teach back resources for cessation? Not a smoker   Is the patient/caregiver able to teach back the hierarchy of who to call/visit for symptoms/problems? PCP, Specialist, Home health nurse, Urgent Care, ED, 911 Yes   Additional teach back comments states staples have been removed, incision healing   Week 2 call completed? Yes   Call end time 1326            Kristal ULLOA - Registered Nurse

## 2024-10-30 LAB
QT INTERVAL: 482 MS
QTC INTERVAL: 521 MS

## 2024-11-11 ENCOUNTER — TELEPHONE (OUTPATIENT)
Dept: FAMILY MEDICINE CLINIC | Age: 69
End: 2024-11-11
Payer: MEDICARE

## 2024-11-11 NOTE — TELEPHONE ENCOUNTER
Left message to return call to verify where and when pt had last eye exam in order to request records.

## 2024-11-12 ENCOUNTER — OFFICE VISIT (OUTPATIENT)
Dept: FAMILY MEDICINE CLINIC | Age: 69
End: 2024-11-12
Payer: MEDICARE

## 2024-11-12 VITALS
TEMPERATURE: 97.8 F | HEART RATE: 80 BPM | BODY MASS INDEX: 41.43 KG/M2 | SYSTOLIC BLOOD PRESSURE: 138 MMHG | DIASTOLIC BLOOD PRESSURE: 72 MMHG | OXYGEN SATURATION: 99 % | WEIGHT: 273.4 LBS | HEIGHT: 68 IN

## 2024-11-12 DIAGNOSIS — I10 ESSENTIAL HYPERTENSION: ICD-10-CM

## 2024-11-12 DIAGNOSIS — E78.2 MIXED HYPERLIPIDEMIA: ICD-10-CM

## 2024-11-12 DIAGNOSIS — E66.01 MORBID OBESITY: ICD-10-CM

## 2024-11-12 DIAGNOSIS — Z23 ENCOUNTER FOR IMMUNIZATION: ICD-10-CM

## 2024-11-12 DIAGNOSIS — E11.9 TYPE 2 DIABETES MELLITUS WITHOUT COMPLICATION, WITHOUT LONG-TERM CURRENT USE OF INSULIN: Primary | ICD-10-CM

## 2024-11-12 LAB
EXPIRATION DATE: ABNORMAL
HBA1C MFR BLD: 7 % (ref 4.5–5.7)
Lab: ABNORMAL

## 2024-11-12 PROCEDURE — 1159F MED LIST DOCD IN RCRD: CPT | Performed by: FAMILY MEDICINE

## 2024-11-12 PROCEDURE — 90662 IIV NO PRSV INCREASED AG IM: CPT | Performed by: FAMILY MEDICINE

## 2024-11-12 PROCEDURE — 3051F HG A1C>EQUAL 7.0%<8.0%: CPT | Performed by: FAMILY MEDICINE

## 2024-11-12 PROCEDURE — 3078F DIAST BP <80 MM HG: CPT | Performed by: FAMILY MEDICINE

## 2024-11-12 PROCEDURE — 83036 HEMOGLOBIN GLYCOSYLATED A1C: CPT | Performed by: FAMILY MEDICINE

## 2024-11-12 PROCEDURE — G0008 ADMIN INFLUENZA VIRUS VAC: HCPCS | Performed by: FAMILY MEDICINE

## 2024-11-12 PROCEDURE — 1126F AMNT PAIN NOTED NONE PRSNT: CPT | Performed by: FAMILY MEDICINE

## 2024-11-12 PROCEDURE — 99214 OFFICE O/P EST MOD 30 MIN: CPT | Performed by: FAMILY MEDICINE

## 2024-11-12 PROCEDURE — 3075F SYST BP GE 130 - 139MM HG: CPT | Performed by: FAMILY MEDICINE

## 2024-11-12 PROCEDURE — 1160F RVW MEDS BY RX/DR IN RCRD: CPT | Performed by: FAMILY MEDICINE

## 2024-11-12 NOTE — PROGRESS NOTES
Andrea Diaz presents to [facility].    Chief Complaint:  Diabetes, blood pressure, cholesterol    Subjective   History of Present Illness:  Marcelino is being seen today for follow-up on his care.  He has type 2 diabetes, hypertension, and elevated cholesterol for which he remains on treatments as noted.  It is of note that he had pacemaker placement since he was seen here most recently.  He is not currently checking his sugar regularly.    Review of Systems:  Review of Systems   Constitutional:  Negative for chills and fever.   Respiratory:  Negative for cough and shortness of breath.    Cardiovascular:  Negative for chest pain and palpitations.   Gastrointestinal:  Negative for abdominal pain, nausea and vomiting.      Objective   Medical History:  Past Medical History:    Essential (primary) hypertension    Other long term (current) drug therapy    Primary generalized (osteo)arthritis    Pure hypercholesterolemia    Type 2 diabetes mellitus without complication    Unilateral primary osteoarthritis, left knee     Past Surgical History:    CARDIAC ELECTROPHYSIOLOGY PROCEDURE    Procedure: Temporary Pacemaker;  Surgeon: Arnoldo Pittman MD;  Location: [facility] INVASIVE LOCATION;  Service: Cardiovascular;  Laterality: N/A;    CARDIAC ELECTROPHYSIOLOGY PROCEDURE    Procedure: Pacemaker DC new medt;  Surgeon: Neftaly Elliott MD;  Location: [facility] CATH INVASIVE LOCATION;  Service: Cardiovascular;  Laterality: N/A;    COLONOSCOPY    Hyperplastic polyp, hemorrhoids    JOINT REPLACEMENT    knee    KNEE ARTHROPLASTY    KNEE SURGERY    meniscus      Family History   Problem Relation Age of Onset    Coronary artery disease Mother     Pancreatic cancer Father     Diabetes type II Father     Colon cancer Other      Social History     Tobacco Use    Smoking status: Never     Passive exposure: Never    Smokeless tobacco: Never   Substance Use Topics    Alcohol use: Not Currently        Health Maintenance Due   Topic Date Due    INFLUENZA VACCINE  08/01/2024    HEMOGLOBIN A1C  02/06/2025        Immunization History   Administered Date(s) Administered    Arexvy (RSV, Adults 60+ yrs) 05/20/2024    COVID-19 (PFIZER) Purple Cap Monovalent 03/17/2021, 04/07/2021    Fluzone High-Dose 65+YRS 11/25/2022    Fluzone High-Dose 65+yrs 11/14/2023    Hepatitis A 06/09/2018, 06/11/2018, 01/02/2019    Influenza, Unspecified 01/05/2021    Pneumococcal Conjugate 13-Valent (PCV13) 10/26/2018    Pneumococcal Polysaccharide (PPSV23) 10/01/2019    Shingrix 06/09/2018, 08/29/2018    Tdap 05/20/2024    Zostavax 10/09/2013       Allergies   Allergen Reactions    Metformin GI Intolerance        Medications:    Current Outpatient Medications:     amLODIPine (NORVASC) 10 MG tablet, Take 1 tablet by mouth Daily., Disp: 90 tablet, Rfl: 3    atorvastatin (LIPITOR) 10 MG tablet, Take 1 tablet by mouth Daily., Disp: 90 tablet, Rfl: 3    Blood Glucose Monitoring Suppl (OneTouch Verio Flex System) w/Device kit, See Admin Instructions., Disp: , Rfl:     carvedilol (COREG) 3.125 MG tablet, Take 1 tablet by mouth 2 (Two) Times a Day With Meals., Disp: 180 tablet, Rfl: 3    diclofenac (VOLTAREN) 75 MG EC tablet, Take 1 tablet by mouth 2 (Two) Times a Day With Meals., Disp: 180 tablet, Rfl: 3    famotidine (PEPCID) 20 MG tablet, Take 1 tablet by mouth every night at bedtime., Disp: 90 tablet, Rfl: 3    glucosamine sulfate 500 MG capsule capsule, Take  by mouth 2 (Two) Times a Day With Meals., Disp: , Rfl:     glucose blood test strip, Check blood sugar once daily  DX E11.9, Disp: 100 each, Rfl: 3    glucose monitor monitoring kit, Check blood sugar once daily  DX E11.9, Disp: 1 each, Rfl: 0    Lancets Thin misc, Check blood sugar once daily  DX E11.9, Disp: 100 each, Rfl: 3    ondansetron (ZOFRAN) 4 MG tablet, Take 1 tablet by mouth Every 8 (Eight) Hours As Needed., Disp: , Rfl:     Vital Signs:   /78   Pulse 82   Temp 97.8  "°F (36.6 °C) (Oral)   Ht 172.7 cm (68\")   Wt 124 kg (273 lb 6.4 oz)   SpO2 99%   BMI 41.57 kg/m²       Physical Exam:  Physical Exam  Vitals reviewed.   Constitutional:       General: He is not in acute distress.     Appearance: He is not ill-appearing.   Eyes:      Pupils: Pupils are equal, round, and reactive to light.   Neck:      Comments: No thyromegaly  Cardiovascular:      Rate and Rhythm: Normal rate and regular rhythm.   Pulmonary:      Effort: Pulmonary effort is normal.      Breath sounds: Normal breath sounds.   Abdominal:      General: There is no distension.      Palpations: Abdomen is soft.      Tenderness: There is no abdominal tenderness.   Musculoskeletal:      Cervical back: Neck supple.   Lymphadenopathy:      Cervical: No cervical adenopathy.   Skin:     Findings: No lesion or rash.   Neurological:      Mental Status: He is alert.     Result Review   The following data was reviewed by Velasquez Daniel MD on 11/12/2024.  Lab Results   Component Value Date    WBC 8.14 10/15/2024    HGB 13.1 10/15/2024    HCT 38.0 10/15/2024    MCV 91.8 10/15/2024     10/15/2024     Lab Results   Component Value Date    GLUCOSE 129 (H) 10/15/2024    BUN 14 10/15/2024    CREATININE 1.02 10/15/2024     10/15/2024    K 3.8 10/15/2024     10/15/2024    CALCIUM 8.6 10/15/2024    PROTEINTOT 6.4 10/15/2024    ALBUMIN 3.4 (L) 10/15/2024    ALT 22 10/15/2024    AST 11 10/15/2024    ALKPHOS 87 10/15/2024    BILITOT 0.8 10/15/2024    GLOB 3.0 10/15/2024    AGRATIO 1.1 10/15/2024    BCR 13.7 10/15/2024    ANIONGAP 10.1 10/15/2024    EGFR 78.6 10/15/2024     Lab Results   Component Value Date    CHOL 125 05/20/2024    CHLPL 136 01/05/2021    TRIG 113 05/20/2024    HDL 35 (L) 05/20/2024    LDL 69 05/20/2024     Lab Results   Component Value Date    TSH 1.830 11/14/2023     Lab Results   Component Value Date    HGBA1C 7.00 (H) 08/06/2024     Lab Results   Component Value Date    PSA 0.518 11/14/2023 "    PSA 0.615 11/14/2022    PSA 0.487 07/30/2021          Assessment and Plan:   Today, we have reviewed his care.  Overall, Marcelino seems well.  Regarding type 2 diabetes, we will move forward with a fingerstick A1c.  He did have relatively recent labs that look good otherwise, and I do not think other testing is necessary today.  It remains an open question whether to consider additional therapy for diabetes such as GLP-1 agonist.  He does have a family history of pancreatic cancer in his father.  I will give that additional thought before making a recommendation.    Diagnoses and all orders for this visit:    1. Type 2 diabetes mellitus without complication, without long-term current use of insulin (Primary)  -     POC Glycosylated Hemoglobin (Hb A1C)    2. Morbid obesity  Comments:  As above.    3. Essential hypertension  Comments:  As above.    4. Mixed hyperlipidemia  Comments:  As above.    5. Encounter for immunization  -     Fluzone High-Dose 65+yrs    Follow Up  Return in about 6 months (around 5/22/2025) for Recheck.  Patient was given instructions and counseling regarding his condition or for health maintenance advice. Please see specific information pulled into the AVS if appropriate.

## 2024-11-15 ENCOUNTER — CLINICAL SUPPORT NO REQUIREMENTS (OUTPATIENT)
Age: 69
End: 2024-11-15
Payer: MEDICARE

## 2024-11-15 ENCOUNTER — OFFICE VISIT (OUTPATIENT)
Age: 69
End: 2024-11-15
Payer: MEDICARE

## 2024-11-15 VITALS
BODY MASS INDEX: 41.52 KG/M2 | SYSTOLIC BLOOD PRESSURE: 130 MMHG | HEIGHT: 68 IN | DIASTOLIC BLOOD PRESSURE: 78 MMHG | WEIGHT: 274 LBS | HEART RATE: 86 BPM

## 2024-11-15 DIAGNOSIS — Z95.0 PRESENCE OF CARDIAC PACEMAKER: ICD-10-CM

## 2024-11-15 DIAGNOSIS — I44.2 THIRD DEGREE HEART BLOCK: Primary | ICD-10-CM

## 2024-11-15 DIAGNOSIS — I10 ESSENTIAL HYPERTENSION: ICD-10-CM

## 2024-11-15 DIAGNOSIS — E11.9 TYPE 2 DIABETES MELLITUS WITHOUT COMPLICATION, WITHOUT LONG-TERM CURRENT USE OF INSULIN: ICD-10-CM

## 2024-11-15 RX ORDER — DIPHENOXYLATE HYDROCHLORIDE AND ATROPINE SULFATE 2.5; .025 MG/1; MG/1
1 TABLET ORAL DAILY
COMMUNITY

## 2024-11-15 NOTE — PROGRESS NOTES
"      ELECTROPHYSIOLOGY   Date of Office Visit: 11/15/2024  Patient Name: Andrea Diaz  : 1955  Encounter Provider: Joss Wetzel PA-C  Electrophysiologist: Dr. Elliott  CHIEF COMPLAINT / REASON FOR OFFICE VISIT     third degree heart block and Pacemaker Check  1 month follow up     HISTORY OF PRESENT ILLNESS     This is a 69 y.o. year old male who presents to [facility] for 1 month follow up from their recent inpatient hospitalization.    He was recently admitted on 10/12/2024 - 10/15/2024 for complete AV block. At that time he was experiencing dizziness, fatigue, and SOA and underwent permanent pacemaker placement for complete AV block. Today, the patient reports overall improvement and states this is the best he's felt in the past 3 years. He has reported improvement of overall energy, dizziness, and SOA. Denies any lightheadedness, HA, chest pain, cough, or LE edema.     His dual-chamber pacemaker device was interrogated in the office. Demonstrates Ap 9.5% and Rvp 94.1%. No events seen.     PMHx: Complete AV block with dual-chamber pacemaker placement, HTN, DMT2, hypercholesteremia    PHYSICAL EXAMINATION     Vital Signs:  /78 (BP Location: Right arm, Patient Position: Sitting)   Pulse 86   Ht 172.7 cm (68\")   Wt 124 kg (274 lb)   BMI 41.66 kg/m²   Estimated body mass index is 41.66 kg/m² as calculated from the following:    Height as of this encounter: 172.7 cm (68\").    Weight as of this encounter: 124 kg (274 lb).       Physical Exam  Constitutional:       Appearance: Normal appearance. He is obese.   HENT:      Head: Normocephalic and atraumatic.      Right Ear: External ear normal.      Left Ear: External ear normal.      Nose: Nose normal.      Mouth/Throat:      Mouth: Mucous membranes are moist.   Eyes:      Extraocular Movements: Extraocular movements intact.      Pupils: Pupils are equal, round, and reactive to light.   Cardiovascular:      " Rate and Rhythm: Normal rate and regular rhythm.      Pulses: Normal pulses.      Heart sounds: Normal heart sounds.   Pulmonary:      Effort: Pulmonary effort is normal.      Breath sounds: Normal breath sounds.   Abdominal:      General: Abdomen is flat.      Palpations: Abdomen is soft.   Musculoskeletal:         General: Normal range of motion.      Cervical back: Normal range of motion and neck supple.      Right lower leg: No edema.      Left lower leg: No edema.   Skin:     General: Skin is warm and dry.   Neurological:      General: No focal deficit present.      Mental Status: He is alert and oriented to person, place, and time. Mental status is at baseline.          Cardiac Testing/Results     Cardiac Testing:   - Echo from outside facility on 07/09/2024: LVEF estimated 55-60%. No LVH or LV dilation.     Result Review :  The following data was reviewed by: Joss Wetzel PA-C on 11/15/2024:    Lipid Panel          5/20/2024    10:52   Lipid Panel   Total Cholesterol 125    Triglycerides 113    HDL Cholesterol 35    VLDL Cholesterol 21    LDL Cholesterol  69    LDL/HDL Ratio 1.93       Lab Results   Component Value Date     10/15/2024     (L) 10/15/2024    K 3.8 10/15/2024    K 3.7 10/15/2024     10/15/2024     10/15/2024    CO2 24.9 10/15/2024    CO2 22.0 10/15/2024    BUN 14 10/15/2024    BUN 16 10/15/2024    CREATININE 1.02 10/15/2024    CREATININE 1.02 10/15/2024    EGFRIFNONA 71 07/30/2021    GLUCOSE 129 (H) 10/15/2024    GLUCOSE 150 (H) 10/15/2024    CALCIUM 8.6 10/15/2024    CALCIUM 8.6 10/15/2024    ALBUMIN 3.4 (L) 10/15/2024    ALBUMIN 3.4 (L) 10/15/2024    AST 11 10/15/2024    AST 14 10/15/2024    ALT 22 10/15/2024    ALT 23 10/15/2024     Lab Results   Component Value Date    WBC 8.14 10/15/2024    WBC 8.49 10/15/2024    HGB 13.1 10/15/2024    HGB 13.4 10/15/2024    HCT 38.0 10/15/2024    HCT 40.2 10/15/2024    MCV 91.8 10/15/2024    MCV 89.7 10/15/2024      "10/15/2024     10/15/2024     No results found for: \"PROBNP\", \"BNP\"  Lab Results   Component Value Date    TROPONINT 20 10/12/2024     Lab Results   Component Value Date    TSH 1.830 11/14/2023    TSH 1.840 03/15/2022                 ECG 12 Lead    Date/Time: 11/15/2024 10:43 AM  Performed by: Joss Elizalde PA-C    Authorized by: Joss Elizalde PA-C  Comparison: compared with previous ECG from 10/15/2024  Rhythm: sinus rhythm and paced  Rate: normal  BPM: 86  Conduction: conduction normal  ST Segments: ST segments normal  QRS axis: left  Pacing: ventricular paced rhythm  Clinical impression: normal ECG                ASSESSMENT & PLAN       Diagnoses and all orders for this visit:    Third degree heart block (Primary) s/p dual-chamber pacemaker placement  -1 mo f/u of dual-chamber pacemaker placement.  -He reports overall improvement. States he feels he has more energy and no longer feels SOA.   -Informed the patient that he no longer has restrictions and can move his LUE as tolerated. Additionally, patient has no restrictions with activity.   -We will plan to interrogate here in office again in 3 months.  -Home monitoring in place    Essential hypertension  -BP in office 130/78  -Instructed patient to continue with home BP monitoring  -Continue with amlodipine and carvedilol.       DM type II  -Patient and wife expressed interest in going to see the nutritionist here on campus.  I said I would place a consult.      Follow Up:  Return in about 3 months (around 2/15/2025) for Dr. Elliott- Routine, Device check.  Patient was given instructions and counseling regarding his condition or for health maintenance advice. Please contact office if worsening symptoms or proceed to ER when appropriate.      Joss Elizalde PA-C  11/15/24  13:17 EST    MEDICATIONS         Discharge Medications            Accurate as of November 15, 2024  1:17 PM. If you have any questions, ask your nurse or doctor.    "             Continue These Medications        Instructions Start Date   amLODIPine 10 MG tablet  Commonly known as: NORVASC   10 mg, Oral, Daily      atorvastatin 10 MG tablet  Commonly known as: LIPITOR   10 mg, Oral, Daily      carvedilol 3.125 MG tablet  Commonly known as: COREG   3.125 mg, Oral, 2 Times Daily With Meals      diclofenac 75 MG EC tablet  Commonly known as: VOLTAREN   75 mg, Oral, 2 Times Daily With Meals      famotidine 20 MG tablet  Commonly known as: PEPCID   20 mg, Oral, Every Night at Bedtime      glucosamine sulfate 500 MG capsule capsule   2 Times Daily With Meals      glucose blood test strip   Check blood sugar once daily  DX E11.9      glucose monitor monitoring kit   Check blood sugar once daily  DX E11.9      Lancets Thin misc   Check blood sugar once daily  DX E11.9      multivitamin tablet tablet   1 tablet, Daily      ondansetron 4 MG tablet  Commonly known as: ZOFRAN   4 mg, Every 8 Hours PRN      OneTouch Verio Flex System w/Device kit   See Admin Instructions                   **Jean Disclaimer: This note was dictated using an electronic transcription. The electronic translation of spoken language may permit erroneous, or at times, nonsensical words or phrases to be inadvertently transcribed. Although I have reviewed the note for such errors, some may still exist.

## 2024-11-18 ENCOUNTER — TELEPHONE (OUTPATIENT)
Dept: FAMILY MEDICINE CLINIC | Age: 69
End: 2024-11-18
Payer: MEDICARE

## 2024-11-18 DIAGNOSIS — N52.9 ERECTILE DYSFUNCTION, UNSPECIFIED ERECTILE DYSFUNCTION TYPE: Primary | ICD-10-CM

## 2024-11-18 DIAGNOSIS — E11.9 TYPE 2 DIABETES MELLITUS WITHOUT COMPLICATION, WITHOUT LONG-TERM CURRENT USE OF INSULIN: ICD-10-CM

## 2024-11-18 DIAGNOSIS — E78.2 MIXED HYPERLIPIDEMIA: ICD-10-CM

## 2024-11-18 NOTE — TELEPHONE ENCOUNTER
Patient called and is having some concern with Erectile Dysfunction. He shared that he is embarrassed by this and would like to just come in for a blood test or something if possible. But, if he needs a full appointment he would do that too. He is hoping to not have to come in for an appointment maybe just for a blood test to determine why this is happening. Patient would like a call back to advise what he should do.

## 2024-11-19 NOTE — TELEPHONE ENCOUNTER
Noted.  I would recommend checking his thyroid and testosterone.  Orders for these labs have been placed.  He may stop by the lab at his convenience.  It would be ideal for these to be done at around 8:00 AM.  Thanks.

## 2024-11-20 ENCOUNTER — LAB (OUTPATIENT)
Dept: LAB | Facility: HOSPITAL | Age: 69
End: 2024-11-20
Payer: MEDICARE

## 2024-11-20 DIAGNOSIS — E78.2 MIXED HYPERLIPIDEMIA: ICD-10-CM

## 2024-11-20 DIAGNOSIS — N52.9 ERECTILE DYSFUNCTION, UNSPECIFIED ERECTILE DYSFUNCTION TYPE: ICD-10-CM

## 2024-11-20 DIAGNOSIS — E11.9 TYPE 2 DIABETES MELLITUS WITHOUT COMPLICATION, WITHOUT LONG-TERM CURRENT USE OF INSULIN: ICD-10-CM

## 2024-11-20 LAB
TESTOST SERPL-MCNC: 192 NG/DL (ref 193–740)
TSH SERPL DL<=0.05 MIU/L-ACNC: 1.6 UIU/ML (ref 0.27–4.2)

## 2024-11-20 PROCEDURE — 84443 ASSAY THYROID STIM HORMONE: CPT

## 2024-11-20 PROCEDURE — 84403 ASSAY OF TOTAL TESTOSTERONE: CPT

## 2024-11-20 PROCEDURE — 36415 COLL VENOUS BLD VENIPUNCTURE: CPT

## 2024-11-21 RX ORDER — SILDENAFIL 100 MG/1
TABLET, FILM COATED ORAL
Qty: 30 TABLET | Refills: 2 | Status: SHIPPED | OUTPATIENT
Start: 2024-11-21

## 2024-12-10 ENCOUNTER — HOSPITAL ENCOUNTER (OUTPATIENT)
Dept: DIABETES SERVICES | Facility: HOSPITAL | Age: 69
Discharge: HOME | End: 2024-12-10
Admitting: PHYSICIAN ASSISTANT
Payer: MEDICARE

## 2024-12-10 PROCEDURE — 97802 MEDICAL NUTRITION INDIV IN: CPT

## 2024-12-10 NOTE — PROGRESS NOTES
Diabetes Education    Patient Name:  Andrea Diaz  YOB: 1955  MRN: [id number]  Admit Date:  12/10/2024    Mr. Diaz met with [facility] INMAN for MNT for Type 2 Diabetes A comprehensive assessment/training record has been sent to medical records (see media tab) to associate with this encounter.      Hemoglobin A1c = 7.0%      We discussed meal planning - he was advised to follow ADA plate method to plan meals. We discussed the importance of eating regular meals. We reviewed how to read food labels. We reviewed consuming complex carbohydrates instead of simple carbohydrates. We discussed increased fiber and the impact on blood glucose. We reviewed carbohydrate portion sizes - we discussed importance of “balance meals/snacks”.  Patient verbalized understanding of all information reviewed at today's visit.       Mr. Diaz has been encouraged to call our office with questions or additional education needs. Please place referral for additional services or follow-up should need arise.     Thank you for the referral     Ade Bond RD, HILDA, HENNY      Electronically signed by:  Ade Bond RD  12/10/24 13:42 EST

## 2025-02-03 ENCOUNTER — OFFICE VISIT (OUTPATIENT)
Dept: FAMILY MEDICINE CLINIC | Age: 70
End: 2025-02-03
Payer: MEDICARE

## 2025-02-03 ENCOUNTER — LAB (OUTPATIENT)
Dept: LAB | Facility: HOSPITAL | Age: 70
End: 2025-02-03
Payer: MEDICARE

## 2025-02-03 VITALS
SYSTOLIC BLOOD PRESSURE: 140 MMHG | HEART RATE: 87 BPM | HEIGHT: 68 IN | BODY MASS INDEX: 42.04 KG/M2 | OXYGEN SATURATION: 97 % | WEIGHT: 277.4 LBS | TEMPERATURE: 98.2 F | DIASTOLIC BLOOD PRESSURE: 72 MMHG

## 2025-02-03 DIAGNOSIS — E11.9 TYPE 2 DIABETES MELLITUS WITHOUT COMPLICATION, WITHOUT LONG-TERM CURRENT USE OF INSULIN: ICD-10-CM

## 2025-02-03 DIAGNOSIS — E66.01 MORBID OBESITY: ICD-10-CM

## 2025-02-03 DIAGNOSIS — R07.9 CHEST PAIN, UNSPECIFIED TYPE: ICD-10-CM

## 2025-02-03 DIAGNOSIS — E11.9 TYPE 2 DIABETES MELLITUS WITHOUT COMPLICATION, WITHOUT LONG-TERM CURRENT USE OF INSULIN: Primary | ICD-10-CM

## 2025-02-03 LAB
ANION GAP SERPL CALCULATED.3IONS-SCNC: 10.8 MMOL/L (ref 5–15)
BUN SERPL-MCNC: 21 MG/DL (ref 8–23)
BUN/CREAT SERPL: 19.6 (ref 7–25)
CALCIUM SPEC-SCNC: 9.8 MG/DL (ref 8.6–10.5)
CHLORIDE SERPL-SCNC: 101 MMOL/L (ref 98–107)
CO2 SERPL-SCNC: 26.2 MMOL/L (ref 22–29)
CREAT SERPL-MCNC: 1.07 MG/DL (ref 0.76–1.27)
EGFRCR SERPLBLD CKD-EPI 2021: 74.2 ML/MIN/1.73
GLUCOSE SERPL-MCNC: 143 MG/DL (ref 65–99)
HBA1C MFR BLD: 7.9 % (ref 4.8–5.6)
POTASSIUM SERPL-SCNC: 4.2 MMOL/L (ref 3.5–5.2)
SODIUM SERPL-SCNC: 138 MMOL/L (ref 136–145)

## 2025-02-03 PROCEDURE — 1160F RVW MEDS BY RX/DR IN RCRD: CPT | Performed by: FAMILY MEDICINE

## 2025-02-03 PROCEDURE — 1159F MED LIST DOCD IN RCRD: CPT | Performed by: FAMILY MEDICINE

## 2025-02-03 PROCEDURE — 3078F DIAST BP <80 MM HG: CPT | Performed by: FAMILY MEDICINE

## 2025-02-03 PROCEDURE — 1126F AMNT PAIN NOTED NONE PRSNT: CPT | Performed by: FAMILY MEDICINE

## 2025-02-03 PROCEDURE — 83036 HEMOGLOBIN GLYCOSYLATED A1C: CPT

## 2025-02-03 PROCEDURE — 80048 BASIC METABOLIC PNL TOTAL CA: CPT

## 2025-02-03 PROCEDURE — 3077F SYST BP >= 140 MM HG: CPT | Performed by: FAMILY MEDICINE

## 2025-02-03 PROCEDURE — 93005 ELECTROCARDIOGRAM TRACING: CPT | Performed by: FAMILY MEDICINE

## 2025-02-03 PROCEDURE — 36415 COLL VENOUS BLD VENIPUNCTURE: CPT

## 2025-02-03 PROCEDURE — 99214 OFFICE O/P EST MOD 30 MIN: CPT | Performed by: FAMILY MEDICINE

## 2025-02-03 RX ORDER — TADALAFIL 20 MG/1
TABLET ORAL
COMMUNITY
Start: 2025-01-26

## 2025-02-03 NOTE — PROGRESS NOTES
Andrea Diaz presents to [facility].    Chief Complaint:  Diabetes follow up    Subjective   History of Present Illness:  Marcelino is being seen today for follow-up on his care.  He has type 2 diabetes for which he currently is on no particular treatment.  He does not check his blood sugar routinely.  He apparently had some blood work recently and there were concerns about his blood sugar and his potassium.  His most recent A1c here was 7.0%.    Marcelino is also having issues with chest pain intermittently.  He is having it a few times per week.  He states it feels like an ache when present.  The pain is mild to moderate in intensity.  This pain does not last that long.  The pain does not radiate.  He is not aware of obvious associated factors.    Review of Systems:  Review of Systems   Constitutional:  Negative for chills and fever.   Respiratory:  Negative for cough and shortness of breath.    Cardiovascular:  Positive for chest pain. Negative for palpitations.   Gastrointestinal:  Negative for abdominal pain, nausea and vomiting.      Objective   Medical History:  Past Medical History:    Essential (primary) hypertension    Other long term (current) drug therapy    Primary generalized (osteo)arthritis    Pure hypercholesterolemia    Type 2 diabetes mellitus without complication    Unilateral primary osteoarthritis, left knee     Past Surgical History:    CARDIAC ELECTROPHYSIOLOGY PROCEDURE    Procedure: Temporary Pacemaker;  Surgeon: Arnoldo Pittman MD;  Location: [facility] INVASIVE LOCATION;  Service: Cardiovascular;  Laterality: N/A;    CARDIAC ELECTROPHYSIOLOGY PROCEDURE    Procedure: Pacemaker DC new medt;  Surgeon: Neftaly Elliott MD;  Location: [facility] CATH INVASIVE LOCATION;  Service: Cardiovascular;  Laterality: N/A;    COLONOSCOPY    Hyperplastic polyp, hemorrhoids    JOINT REPLACEMENT    knee    KNEE ARTHROPLASTY    KNEE SURGERY    meniscus      Family History    Problem Relation Age of Onset    Coronary artery disease Mother     Pancreatic cancer Father     Diabetes type II Father     Colon cancer Other      Social History     Tobacco Use    Smoking status: Never     Passive exposure: Never    Smokeless tobacco: Never   Substance Use Topics    Alcohol use: Not Currently       There are no preventive care reminders to display for this patient.     Immunization History   Administered Date(s) Administered    Arexvy (RSV, Adults 60+ yrs) 05/20/2024    COVID-19 (PFIZER) Purple Cap Monovalent 03/17/2021, 04/07/2021    Fluzone High-Dose 65+YRS 11/25/2022, 11/12/2024    Fluzone High-Dose 65+yrs 11/14/2023    Hepatitis A 06/09/2018, 06/11/2018, 01/02/2019    Influenza, Unspecified 01/05/2021    Pneumococcal Conjugate 13-Valent (PCV13) 10/26/2018    Pneumococcal Polysaccharide (PPSV23) 10/01/2019    Shingrix 06/09/2018, 08/29/2018    Tdap 05/20/2024    Zostavax 10/09/2013       Allergies   Allergen Reactions    Metformin GI Intolerance        Medications:    Current Outpatient Medications:     tadalafil (CIALIS) 20 MG tablet, TAKE 1 TABLET BY MOUTH EVERY 3 DAYS AS NEEDED FOR ERECTILE DYSFUNCTION, Disp: , Rfl:     amLODIPine (NORVASC) 10 MG tablet, Take 1 tablet by mouth Daily., Disp: 90 tablet, Rfl: 3    atorvastatin (LIPITOR) 10 MG tablet, Take 1 tablet by mouth Daily., Disp: 90 tablet, Rfl: 3    Blood Glucose Monitoring Suppl (OneTouch Verio Flex System) w/Device kit, See Admin Instructions., Disp: , Rfl:     carvedilol (COREG) 3.125 MG tablet, Take 1 tablet by mouth 2 (Two) Times a Day With Meals., Disp: 180 tablet, Rfl: 3    diclofenac (VOLTAREN) 75 MG EC tablet, Take 1 tablet by mouth 2 (Two) Times a Day With Meals., Disp: 180 tablet, Rfl: 3    famotidine (PEPCID) 20 MG tablet, Take 1 tablet by mouth every night at bedtime., Disp: 90 tablet, Rfl: 3    glucosamine sulfate 500 MG capsule capsule, Take  by mouth 2 (Two) Times a Day With Meals., Disp: , Rfl:     glucose blood test  "strip, Check blood sugar once daily  DX E11.9, Disp: 100 each, Rfl: 3    glucose monitor monitoring kit, Check blood sugar once daily  DX E11.9, Disp: 1 each, Rfl: 0    Lancets Thin misc, Check blood sugar once daily  DX E11.9, Disp: 100 each, Rfl: 3    multivitamin (MULTI VITAMIN PO), Take 1 tablet by mouth Daily., Disp: , Rfl:     ondansetron (ZOFRAN) 4 MG tablet, Take 1 tablet by mouth Every 8 (Eight) Hours As Needed., Disp: , Rfl:     sildenafil (VIAGRA) 100 MG tablet, Take 0.5 to 1 tablet approximately 1 hour prior to sex as needed, Disp: 30 tablet, Rfl: 2    Vital Signs:   /72 (BP Location: Right arm, Patient Position: Sitting)   Pulse 87   Temp 98.2 °F (36.8 °C) (Oral)   Ht 172.7 cm (67.99\")   Wt 126 kg (277 lb 6.4 oz)   SpO2 97%   BMI 42.19 kg/m²       Physical Exam:  Physical Exam  Vitals reviewed.   Constitutional:       General: He is not in acute distress.     Appearance: He is not ill-appearing.   Eyes:      Pupils: Pupils are equal, round, and reactive to light.   Neck:      Comments: No thyromegaly  Cardiovascular:      Rate and Rhythm: Normal rate and regular rhythm.   Pulmonary:      Effort: Pulmonary effort is normal.      Breath sounds: Normal breath sounds.   Abdominal:      General: There is no distension.      Palpations: Abdomen is soft.      Tenderness: There is no abdominal tenderness.   Musculoskeletal:      Cervical back: Neck supple.   Lymphadenopathy:      Cervical: No cervical adenopathy.   Skin:     Findings: No lesion or rash.   Neurological:      Mental Status: He is alert.     Result Review   The following data was reviewed by Velasquez Daniel MD on 02/03/2025.  Lab Results   Component Value Date    WBC 8.14 10/15/2024    HGB 13.1 10/15/2024    HCT 38.0 10/15/2024    MCV 91.8 10/15/2024     10/15/2024     Lab Results   Component Value Date    GLUCOSE 129 (H) 10/15/2024    BUN 14 10/15/2024    CREATININE 1.02 10/15/2024     10/15/2024    K 3.8 " 10/15/2024     10/15/2024    CALCIUM 8.6 10/15/2024    PROTEINTOT 6.4 10/15/2024    ALBUMIN 3.4 (L) 10/15/2024    ALT 22 10/15/2024    AST 11 10/15/2024    ALKPHOS 87 10/15/2024    BILITOT 0.8 10/15/2024    GLOB 3.0 10/15/2024    AGRATIO 1.1 10/15/2024    BCR 13.7 10/15/2024    ANIONGAP 10.1 10/15/2024    EGFR 78.6 10/15/2024     Lab Results   Component Value Date    CHOL 125 05/20/2024    CHLPL 136 01/05/2021    TRIG 113 05/20/2024    HDL 35 (L) 05/20/2024    LDL 69 05/20/2024     Lab Results   Component Value Date    TSH 1.600 11/20/2024     Lab Results   Component Value Date    HGBA1C 7.0 (A) 11/12/2024     Lab Results   Component Value Date    PSA 0.518 11/14/2023    PSA 0.615 11/14/2022    PSA 0.487 07/30/2021     ECG 12 Lead    Date/Time: 2/3/2025 4:58 PM  Performed by: Velasquez Daniel MD    Authorized by: Velasquez Daniel MD  Comparison: compared with previous ECG from 11/15/2024  Similar to previous ECG  Rhythm: paced  Ectopy: infrequent PVCs  Rate: normal  BPM: 78  ST Segments: ST segments normal  T flattening: III  Pacing: atrial sensed rhythm  Clinical impression: abnormal EKG  Clinical impression comment: This is an abnormal EKG with findings as noted above.  Ventricular pacing is present.  I am unsure of the validity of the findings, but there does not appear to be significant change compared to most recent tracing.  No new ischemic findings are present.          Assessment and Plan:   Today, we have reviewed his care.  As noted above there have been recent concerns regarding his blood sugar.  We will move ahead with repeat laboratory testing.  Consider starting GLP-1 agonist therapy given need to treat concomitant obesity.  EKG today shows a paced rhythm.  I do not see obvious new ischemic findings, but I am unsure of the validity of the interpretation given the pacing.  He has an appointment with cardiology in about 2 weeks.  I have asked him to monitor the symptoms until then.   ER precautions have also been given.  We will see what the labs show and then reach back out to him with guidance.    Diagnoses and all orders for this visit:    1. Type 2 diabetes mellitus without complication, without long-term current use of insulin (Primary)  -     Basic Metabolic Panel; Future  -     Hemoglobin A1c; Future    2. Morbid obesity  -     Basic Metabolic Panel; Future  -     Hemoglobin A1c; Future    3. Chest pain, unspecified type  -     ECG 12 Lead    Follow Up  Return in about 17 weeks (around 6/2/2025) for Recheck, Next scheduled follow up.  Patient was given instructions and counseling regarding his condition or for health maintenance advice. Please see specific information pulled into the AVS if appropriate.

## 2025-02-04 RX ORDER — TIRZEPATIDE 2.5 MG/.5ML
2.5 INJECTION, SOLUTION SUBCUTANEOUS WEEKLY
Qty: 2 ML | Refills: 0 | Status: SHIPPED | OUTPATIENT
Start: 2025-02-04

## 2025-02-18 ENCOUNTER — CLINICAL SUPPORT (OUTPATIENT)
Dept: FAMILY MEDICINE CLINIC | Age: 70
End: 2025-02-18
Payer: MEDICARE

## 2025-02-18 ENCOUNTER — TELEPHONE (OUTPATIENT)
Dept: CARDIOLOGY | Facility: CLINIC | Age: 70
End: 2025-02-18

## 2025-02-18 NOTE — TELEPHONE ENCOUNTER
Caller: Oxana Cheek    Relationship to patient: Emergency Contact    Best call back number: [phone]     Additional notes: PT HAD PACEMAKER PUT IN OCT OF 2024 AND DUE TO WEATHER THEY ARE NEEDING TO RSD UPCOMING APPT - NO AVAILABILITY FOR OVER RSD TIMEFRAME -

## 2025-03-04 DIAGNOSIS — E66.01 MORBID OBESITY: ICD-10-CM

## 2025-03-04 DIAGNOSIS — E11.9 TYPE 2 DIABETES MELLITUS WITHOUT COMPLICATION, WITHOUT LONG-TERM CURRENT USE OF INSULIN: Primary | ICD-10-CM

## 2025-03-04 NOTE — TELEPHONE ENCOUNTER
----- Message from Meghan NO sent at 2/4/2025  9:02 AM EST -----   TICKLE to call him in 4 weeks to see if we may increase to 5 mg weekly.

## 2025-03-04 NOTE — TELEPHONE ENCOUNTER
I have sent the 5 mg dose.  Please TICKLE to call him in 4 weeks to see if we may increase to 7.5 mg weekly.  Thanks.

## 2025-04-01 DIAGNOSIS — E11.9 TYPE 2 DIABETES MELLITUS WITHOUT COMPLICATION, WITHOUT LONG-TERM CURRENT USE OF INSULIN: ICD-10-CM

## 2025-04-01 DIAGNOSIS — E66.01 MORBID OBESITY: ICD-10-CM

## 2025-04-01 NOTE — TELEPHONE ENCOUNTER
The 7.5 mg dose has been sent to [facility].  Please TICKLE to call him in 4 weeks to see if we may increase to 10 mg weekly.  Thanks.

## 2025-04-01 NOTE — TELEPHONE ENCOUNTER
PATIENT STOPPED BY  STATED HE NEEDS REFILL ON MOUNJARO SENT TO [facility] [place]. PATIENT OUT OF IT.

## 2025-04-29 DIAGNOSIS — E11.9 TYPE 2 DIABETES MELLITUS WITHOUT COMPLICATION, WITHOUT LONG-TERM CURRENT USE OF INSULIN: Primary | ICD-10-CM

## 2025-04-29 DIAGNOSIS — E66.01 MORBID OBESITY: ICD-10-CM

## 2025-04-29 NOTE — TELEPHONE ENCOUNTER
The 10 mg dose of Mounjaro has been sent.  Please TICKLE to call him in 4 weeks to see if we may increase to 12.5 mg weekly.  Thanks.   Detail Level: Detailed Include Location In Plan?: Yes

## 2025-04-29 NOTE — TELEPHONE ENCOUNTER
Pt states he is doing well and ready for the next dose, rx queued.    Blood sugars   143  119  130  133  136  101  138  143

## 2025-04-29 NOTE — TELEPHONE ENCOUNTER
----- Message from Jaimie GUZMAN sent at 4/1/2025 11:03 AM EDT -----  Please TICKLE to call him in 4 weeks to see if we may increase to 10 mg weekly.

## 2025-05-06 ENCOUNTER — TELEPHONE (OUTPATIENT)
Age: 70
End: 2025-05-06

## 2025-05-06 NOTE — TELEPHONE ENCOUNTER
FYI: Pt's remote report for their DC pacemaker shows 1 older VT-NS from 2/22 20:19 with avg V rate 158 bpm - EGM shows 11 beats of V>A conducted beats of VT-NS. EGM below. Please review.   Kindly,   Suni Ritchie Device RN

## 2025-05-27 DIAGNOSIS — E11.9 TYPE 2 DIABETES MELLITUS WITHOUT COMPLICATION, WITHOUT LONG-TERM CURRENT USE OF INSULIN: Primary | ICD-10-CM

## 2025-05-27 DIAGNOSIS — E66.01 MORBID OBESITY: ICD-10-CM

## 2025-05-27 NOTE — TELEPHONE ENCOUNTER
----- Message from Meghan NO sent at 4/29/2025  1:34 PM EDT -----  TICKLE to call him in 4 weeks to see if we may increase to 12.5 mg weekly.  Thanks.

## 2025-05-30 ENCOUNTER — OFFICE VISIT (OUTPATIENT)
Age: 70
End: 2025-05-30
Payer: MEDICARE

## 2025-05-30 ENCOUNTER — CLINICAL SUPPORT NO REQUIREMENTS (OUTPATIENT)
Age: 70
End: 2025-05-30
Payer: MEDICARE

## 2025-05-30 VITALS
SYSTOLIC BLOOD PRESSURE: 146 MMHG | HEIGHT: 68 IN | HEART RATE: 89 BPM | WEIGHT: 280 LBS | BODY MASS INDEX: 42.44 KG/M2 | DIASTOLIC BLOOD PRESSURE: 90 MMHG

## 2025-05-30 DIAGNOSIS — I10 ESSENTIAL HYPERTENSION: ICD-10-CM

## 2025-05-30 DIAGNOSIS — I44.2 THIRD DEGREE HEART BLOCK: Primary | ICD-10-CM

## 2025-05-30 DIAGNOSIS — I44.2 AV BLOCK, COMPLETE: ICD-10-CM

## 2025-05-30 DIAGNOSIS — Z95.0 PRESENCE OF CARDIAC PACEMAKER: Primary | ICD-10-CM

## 2025-05-30 DIAGNOSIS — Z95.0 PRESENCE OF CARDIAC PACEMAKER: ICD-10-CM

## 2025-05-30 PROCEDURE — 3077F SYST BP >= 140 MM HG: CPT | Performed by: INTERNAL MEDICINE

## 2025-05-30 PROCEDURE — 3080F DIAST BP >= 90 MM HG: CPT | Performed by: INTERNAL MEDICINE

## 2025-05-30 PROCEDURE — 93000 ELECTROCARDIOGRAM COMPLETE: CPT | Performed by: INTERNAL MEDICINE

## 2025-05-30 PROCEDURE — 99214 OFFICE O/P EST MOD 30 MIN: CPT | Performed by: INTERNAL MEDICINE

## 2025-05-30 NOTE — PROGRESS NOTES
Date of Office Visit: 2025  Encounter Provider: Neftaly Elliott MD  Place of Service: [facility] CARDI[facility]  Patient Name: Andrea Diaz  : 1955    Subjective:     Encounter Date:2025      Patient ID: Andrea Diaz is a 69 y.o. male who has a cc of  av block and I did a LB area pacer in October     The patient had a good year.   No anginal chest pain,   No sig knutson,   No soa,   No fainting,  No orthostasis.   No edema.   Exercise tolerance: limited due to back pain.     There have been no hospital admission since the last visit.     There have been no bleeding events.       Past Medical History:   Diagnosis Date    Essential (primary) hypertension     Other long term (current) drug therapy     Primary generalized (osteo)arthritis     Pure hypercholesterolemia     Type 2 diabetes mellitus without complication     Unilateral primary osteoarthritis, left knee        Social History     Socioeconomic History    Marital status:     Number of children: 2   Tobacco Use    Smoking status: Never     Passive exposure: Never    Smokeless tobacco: Never   Vaping Use    Vaping status: Never Used   Substance and Sexual Activity    Alcohol use: Not Currently    Drug use: No    Sexual activity: Defer       Family History   Problem Relation Age of Onset    Coronary artery disease Mother     Pancreatic cancer Father     Diabetes type II Father     Colon cancer Other        Review of Systems   Constitutional: Negative for fever and night sweats.   HENT:  Negative for ear pain and stridor.    Eyes:  Negative for discharge and visual halos.   Cardiovascular:  Negative for cyanosis.   Respiratory:  Negative for hemoptysis and sputum production.    Hematologic/Lymphatic: Negative for adenopathy.   Skin:  Negative for nail changes and unusual hair distribution.   Musculoskeletal:  Positive for arthritis and back pain. Negative for gout and joint swelling.   Gastrointestinal:  Negative  "for bowel incontinence and flatus.   Genitourinary:  Negative for dysuria and flank pain.   Neurological:  Negative for seizures and tremors.   Psychiatric/Behavioral:  Negative for altered mental status. The patient is not nervous/anxious.             Objective:     Vitals:    05/30/25 1323   BP: 146/90   BP Location: Left arm   Patient Position: Sitting   Pulse: 89   Weight: 127 kg (280 lb)   Height: 172.7 cm (68\")         Eyes:      General:         Right eye: No discharge.         Left eye: No discharge.   HENT:      Head: Normocephalic and atraumatic.   Neck:      Thyroid: No thyromegaly.      Vascular: No JVD.   Pulmonary:      Effort: Pulmonary effort is normal.      Breath sounds: Normal breath sounds. No rales.   Cardiovascular:      Normal rate. Regular rhythm.      No gallop.    Edema:     Peripheral edema absent.   Abdominal:      General: Bowel sounds are normal.      Palpations: Abdomen is soft.      Tenderness: There is no abdominal tenderness.   Musculoskeletal: Normal range of motion.         General: No deformity. Skin:     General: Skin is warm and dry.      Findings: No erythema.   Neurological:      Mental Status: Alert and oriented to person, place, and time.      Motor: Normal muscle tone.   Psychiatric:         Behavior: Behavior normal.         Thought Content: Thought content normal.           ECG 12 Lead    Date/Time: 5/30/2025 2:01 PM  Performed by: Neftaly Elliott MD    Authorized by: Neftaly Elliott MD  Comparison: compared with previous ECG   Similar to previous ECG  Rhythm: sinus rhythm and paced          Lab Review:       Assessment:          Diagnosis Plan   1. Third degree heart block        2. AV block, complete        3. s/p dual chamber PPM 10.2024        4. Essential hypertension               Plan:       I reviewed the pacemaker/ICD tracings and the pacing and sensing parameters are normal.  AF burden is 0    HTN -- good control.     He is fine and also on tirzepatide which is " great because weight loss is important

## 2025-06-02 ENCOUNTER — LAB (OUTPATIENT)
Dept: LAB | Facility: HOSPITAL | Age: 70
End: 2025-06-02
Payer: MEDICARE

## 2025-06-02 ENCOUNTER — OFFICE VISIT (OUTPATIENT)
Dept: FAMILY MEDICINE CLINIC | Age: 70
End: 2025-06-02
Payer: MEDICARE

## 2025-06-02 ENCOUNTER — RESULTS FOLLOW-UP (OUTPATIENT)
Dept: LAB | Facility: HOSPITAL | Age: 70
End: 2025-06-02
Payer: MEDICARE

## 2025-06-02 VITALS
OXYGEN SATURATION: 95 % | BODY MASS INDEX: 42.71 KG/M2 | DIASTOLIC BLOOD PRESSURE: 71 MMHG | TEMPERATURE: 97.8 F | WEIGHT: 281.8 LBS | HEART RATE: 90 BPM | SYSTOLIC BLOOD PRESSURE: 123 MMHG | HEIGHT: 68 IN

## 2025-06-02 DIAGNOSIS — Z79.899 OTHER LONG TERM (CURRENT) DRUG THERAPY: ICD-10-CM

## 2025-06-02 DIAGNOSIS — R82.81 PYURIA: ICD-10-CM

## 2025-06-02 DIAGNOSIS — I10 ESSENTIAL HYPERTENSION: ICD-10-CM

## 2025-06-02 DIAGNOSIS — Z12.5 PROSTATE CANCER SCREENING: ICD-10-CM

## 2025-06-02 DIAGNOSIS — Z00.00 PHYSICAL EXAM: Primary | ICD-10-CM

## 2025-06-02 DIAGNOSIS — E78.2 MIXED HYPERLIPIDEMIA: ICD-10-CM

## 2025-06-02 DIAGNOSIS — E11.9 TYPE 2 DIABETES MELLITUS WITHOUT COMPLICATION, WITHOUT LONG-TERM CURRENT USE OF INSULIN: ICD-10-CM

## 2025-06-02 DIAGNOSIS — E66.01 MORBID OBESITY: ICD-10-CM

## 2025-06-02 DIAGNOSIS — M15.0 PRIMARY GENERALIZED (OSTEO)ARTHRITIS: ICD-10-CM

## 2025-06-02 DIAGNOSIS — R82.81 PYURIA: Primary | ICD-10-CM

## 2025-06-02 LAB
ALBUMIN SERPL-MCNC: 4 G/DL (ref 3.5–5.2)
ALBUMIN UR-MCNC: 4 MG/DL
ALBUMIN/GLOB SERPL: 1.3 G/DL
ALP SERPL-CCNC: 89 U/L (ref 39–117)
ALT SERPL W P-5'-P-CCNC: 29 U/L (ref 1–41)
ANION GAP SERPL CALCULATED.3IONS-SCNC: 12.2 MMOL/L (ref 5–15)
AST SERPL-CCNC: 18 U/L (ref 1–40)
BACTERIA UR QL AUTO: ABNORMAL /HPF
BILIRUB SERPL-MCNC: 0.3 MG/DL (ref 0–1.2)
BILIRUB UR QL STRIP: NEGATIVE
BUN SERPL-MCNC: 22 MG/DL (ref 8–23)
BUN/CREAT SERPL: 17.7 (ref 7–25)
CALCIUM SPEC-SCNC: 9.2 MG/DL (ref 8.6–10.5)
CHLORIDE SERPL-SCNC: 103 MMOL/L (ref 98–107)
CHOLEST SERPL-MCNC: 125 MG/DL (ref 0–200)
CLARITY UR: ABNORMAL
CO2 SERPL-SCNC: 21.8 MMOL/L (ref 22–29)
COLOR UR: YELLOW
CREAT SERPL-MCNC: 1.24 MG/DL (ref 0.76–1.27)
CREAT UR-MCNC: 112.7 MG/DL
DEPRECATED RDW RBC AUTO: 39.7 FL (ref 37–54)
EGFRCR SERPLBLD CKD-EPI 2021: 62.2 ML/MIN/1.73
ERYTHROCYTE [DISTWIDTH] IN BLOOD BY AUTOMATED COUNT: 12.1 % (ref 12.3–15.4)
GLOBULIN UR ELPH-MCNC: 3.2 GM/DL
GLUCOSE SERPL-MCNC: 166 MG/DL (ref 65–99)
GLUCOSE UR STRIP-MCNC: NEGATIVE MG/DL
HBA1C MFR BLD: 6.3 % (ref 4.8–5.6)
HCT VFR BLD AUTO: 39.1 % (ref 37.5–51)
HDLC SERPL-MCNC: 35 MG/DL (ref 40–60)
HGB BLD-MCNC: 13.5 G/DL (ref 13–17.7)
HGB UR QL STRIP.AUTO: ABNORMAL
KETONES UR QL STRIP: NEGATIVE
LDLC SERPL CALC-MCNC: 62 MG/DL (ref 0–100)
LDLC/HDLC SERPL: 1.65 {RATIO}
LEUKOCYTE ESTERASE UR QL STRIP.AUTO: ABNORMAL
MCH RBC QN AUTO: 30.3 PG (ref 26.6–33)
MCHC RBC AUTO-ENTMCNC: 34.5 G/DL (ref 31.5–35.7)
MCV RBC AUTO: 87.9 FL (ref 79–97)
MICROALBUMIN/CREAT UR: 35.5 MG/G (ref 0–29)
NITRITE UR QL STRIP: NEGATIVE
PH UR STRIP.AUTO: 5.5 [PH] (ref 5–8)
PLATELET # BLD AUTO: 216 10*3/MM3 (ref 140–450)
PMV BLD AUTO: 10 FL (ref 6–12)
POTASSIUM SERPL-SCNC: 4 MMOL/L (ref 3.5–5.2)
PROT SERPL-MCNC: 7.2 G/DL (ref 6–8.5)
PROT UR QL STRIP: NEGATIVE
PSA SERPL-MCNC: 1.01 NG/ML (ref 0–4)
RBC # BLD AUTO: 4.45 10*6/MM3 (ref 4.14–5.8)
RBC # UR STRIP: ABNORMAL /HPF
REF LAB TEST METHOD: ABNORMAL
SODIUM SERPL-SCNC: 137 MMOL/L (ref 136–145)
SP GR UR STRIP: 1.02 (ref 1–1.03)
SQUAMOUS #/AREA URNS HPF: ABNORMAL /HPF
TRIGL SERPL-MCNC: 161 MG/DL (ref 0–150)
TSH SERPL DL<=0.05 MIU/L-ACNC: 1.16 UIU/ML (ref 0.27–4.2)
UROBILINOGEN UR QL STRIP: ABNORMAL
VLDLC SERPL-MCNC: 28 MG/DL (ref 5–40)
WBC # UR STRIP: ABNORMAL /HPF
WBC NRBC COR # BLD AUTO: 7.08 10*3/MM3 (ref 3.4–10.8)

## 2025-06-02 PROCEDURE — G2211 COMPLEX E/M VISIT ADD ON: HCPCS | Performed by: FAMILY MEDICINE

## 2025-06-02 PROCEDURE — 83036 HEMOGLOBIN GLYCOSYLATED A1C: CPT

## 2025-06-02 PROCEDURE — 87186 SC STD MICRODIL/AGAR DIL: CPT

## 2025-06-02 PROCEDURE — 82043 UR ALBUMIN QUANTITATIVE: CPT

## 2025-06-02 PROCEDURE — 82570 ASSAY OF URINE CREATININE: CPT

## 2025-06-02 PROCEDURE — 87086 URINE CULTURE/COLONY COUNT: CPT

## 2025-06-02 PROCEDURE — 99214 OFFICE O/P EST MOD 30 MIN: CPT | Performed by: FAMILY MEDICINE

## 2025-06-02 PROCEDURE — 81001 URINALYSIS AUTO W/SCOPE: CPT

## 2025-06-02 PROCEDURE — 80061 LIPID PANEL: CPT

## 2025-06-02 PROCEDURE — 85027 COMPLETE CBC AUTOMATED: CPT

## 2025-06-02 PROCEDURE — 87077 CULTURE AEROBIC IDENTIFY: CPT

## 2025-06-02 PROCEDURE — G0439 PPPS, SUBSEQ VISIT: HCPCS | Performed by: FAMILY MEDICINE

## 2025-06-02 PROCEDURE — 80053 COMPREHEN METABOLIC PANEL: CPT

## 2025-06-02 PROCEDURE — 36415 COLL VENOUS BLD VENIPUNCTURE: CPT

## 2025-06-02 PROCEDURE — 1126F AMNT PAIN NOTED NONE PRSNT: CPT | Performed by: FAMILY MEDICINE

## 2025-06-02 PROCEDURE — 3051F HG A1C>EQUAL 7.0%<8.0%: CPT | Performed by: FAMILY MEDICINE

## 2025-06-02 PROCEDURE — 1159F MED LIST DOCD IN RCRD: CPT | Performed by: FAMILY MEDICINE

## 2025-06-02 PROCEDURE — 84443 ASSAY THYROID STIM HORMONE: CPT

## 2025-06-02 PROCEDURE — 3078F DIAST BP <80 MM HG: CPT | Performed by: FAMILY MEDICINE

## 2025-06-02 PROCEDURE — 1170F FXNL STATUS ASSESSED: CPT | Performed by: FAMILY MEDICINE

## 2025-06-02 PROCEDURE — 3074F SYST BP LT 130 MM HG: CPT | Performed by: FAMILY MEDICINE

## 2025-06-02 PROCEDURE — 1160F RVW MEDS BY RX/DR IN RCRD: CPT | Performed by: FAMILY MEDICINE

## 2025-06-02 PROCEDURE — G0103 PSA SCREENING: HCPCS

## 2025-06-02 RX ORDER — ATORVASTATIN CALCIUM 10 MG/1
10 TABLET, FILM COATED ORAL DAILY
Qty: 90 TABLET | Refills: 3 | Status: SHIPPED | OUTPATIENT
Start: 2025-06-02

## 2025-06-02 RX ORDER — FAMOTIDINE 20 MG/1
20 TABLET, FILM COATED ORAL
Qty: 90 TABLET | Refills: 3 | Status: SHIPPED | OUTPATIENT
Start: 2025-06-02

## 2025-06-02 RX ORDER — AMLODIPINE BESYLATE 10 MG/1
10 TABLET ORAL DAILY
Qty: 90 TABLET | Refills: 3 | Status: SHIPPED | OUTPATIENT
Start: 2025-06-02

## 2025-06-02 RX ORDER — DICLOFENAC SODIUM 75 MG/1
75 TABLET, DELAYED RELEASE ORAL 2 TIMES DAILY WITH MEALS
Qty: 180 TABLET | Refills: 3 | Status: SHIPPED | OUTPATIENT
Start: 2025-06-02

## 2025-06-02 RX ORDER — CARVEDILOL 3.12 MG/1
3.12 TABLET ORAL 2 TIMES DAILY WITH MEALS
Qty: 180 TABLET | Refills: 3 | Status: SHIPPED | OUTPATIENT
Start: 2025-06-02

## 2025-06-02 NOTE — PROGRESS NOTES
The ABCs of the Annual Wellness Visit  Subsequent Medicare Wellness Visit    Subjective    Andrea Diaz is a 69 y.o. patient who presents for a Subsequent Medicare Wellness Visit.    The following portions of the patient's history were reviewed and updated as appropriate: allergies, current medications, past family history, past medical history, past social history, past surgical history, and problem list.    Compared to one year ago, the patient feels his physical health is the same.    Compared to one year ago, the patient feels his mental health is the same.    Recent Hospitalizations:  He was admitted within the past 365 days at [facility].     Current Medical Providers:  Patient Care Team:  Velasquez Daniel MD as PCP - General (Family Medicine)  Neftaly Elliott MD as Consulting Physician (Cardiology)    Outpatient Medications Prior to Visit   Medication Sig Dispense Refill    Blood Glucose Monitoring Suppl (OneTouch Verio Flex System) w/Device kit See Admin Instructions.      glucosamine sulfate 500 MG capsule capsule Take  by mouth 2 (Two) Times a Day With Meals.      glucose blood test strip Check blood sugar once daily  DX E11.9 100 each 3    glucose monitor monitoring kit Check blood sugar once daily  DX E11.9 1 each 0    Lancets Thin misc Check blood sugar once daily  DX E11.9 100 each 3    multivitamin (MULTI VITAMIN PO) Take 1 tablet by mouth Daily.      tadalafil (CIALIS) 20 MG tablet TAKE 1 TABLET BY MOUTH EVERY 3 DAYS AS NEEDED FOR ERECTILE DYSFUNCTION      Tirzepatide 12.5 MG/0.5ML solution auto-injector Inject 0.5 mL under the skin into the appropriate area as directed 1 (One) Time Per Week. 2 mL 0    amLODIPine (NORVASC) 10 MG tablet Take 1 tablet by mouth Daily. 90 tablet 3    atorvastatin (LIPITOR) 10 MG tablet Take 1 tablet by mouth Daily. 90 tablet 3    carvedilol (COREG) 3.125 MG tablet Take 1 tablet by mouth 2 (Two) Times a Day With Meals. 180 tablet 3     "diclofenac (VOLTAREN) 75 MG EC tablet Take 1 tablet by mouth 2 (Two) Times a Day With Meals. 180 tablet 3    famotidine (PEPCID) 20 MG tablet Take 1 tablet by mouth every night at bedtime. 90 tablet 3     No facility-administered medications prior to visit.       No opioid medication identified on active medication list. I have reviewed chart for other potential  high risk medication/s and harmful drug interactions in the elderly.      Aspirin is not on active medication list.  Aspirin use is not indicated based on review of current medical condition/s. Risk of harm outweighs potential benefits.      Patient Active Problem List   Diagnosis    Family history of colon cancer    Screening for colon cancer    Type 2 diabetes mellitus without complication, without long-term current use of insulin    Pure hypercholesterolemia    Primary generalized (osteo)arthritis    Essential hypertension    Mixed hyperlipidemia    Morbid obesity    Third degree heart block    GERD without esophagitis    AV block, complete    s/p dual chamber PPM 10.2024     Advance Care Planning  Advance Directive is not on file.  ACP discussion was held with the patient during this visit. Patient does not have an advance directive, information provided.  He is unsure who would make decisions for him if needed.     Objective    Vitals:    06/02/25 1319   BP: 123/71   BP Location: Right arm   Patient Position: Sitting   Pulse: 90   Temp: 97.8 °F (36.6 °C)   TempSrc: Oral   SpO2: 95%   Weight: 128 kg (281 lb 12.8 oz)   Height: 172.7 cm (67.99\")   PainSc: 0-No pain     Estimated body mass index is 42.86 kg/m² as calculated from the following:    Height as of this encounter: 172.7 cm (67.99\").    Weight as of this encounter: 128 kg (281 lb 12.8 oz).    Class 3 Severe Obesity (BMI >=40). Obesity-related health conditions include the following: hypertension, coronary heart disease, diabetes mellitus, and dyslipidemias. Obesity is worsening. BMI is is above " average; BMI management plan is completed. We discussed portion control, increasing exercise, and pharmacologic options including Mounjaro.    Does the patient have evidence of cognitive impairment? No        HEALTH RISK ASSESSMENT    Smoking Status:  Social History     Tobacco Use   Smoking Status Never    Passive exposure: Never   Smokeless Tobacco Never     Alcohol Consumption:  Social History     Substance and Sexual Activity   Alcohol Use Not Currently     Fall Risk Screen:    MIRNA Fall Risk Assessment was completed, and patient is at HIGH risk for falls. Assessment completed on:2025    Depression Screenin/2/2025     1:19 PM   PHQ-2/PHQ-9 Depression Screening   Little interest or pleasure in doing things Not at all   Feeling down, depressed, or hopeless Not at all   How difficult have these problems made it for you to do your work, take care of things at home, or get along with other people? Not difficult at all       Health Habits and Functional and Cognitive Screenin/2/2025     1:20 PM   Functional & Cognitive Status   Do you have difficulty preparing food and eating? No   Do you have difficulty bathing yourself, getting dressed or grooming yourself? No   Do you have difficulty using the toilet? No   Do you have difficulty moving around from place to place? No   Do you have trouble with steps or getting out of a bed or a chair? No   Current Diet Unhealthy Diet   Dental Exam Up to date   Eye Exam Up to date   Exercise (times per week) 0 times per week   Current Exercises Include No Regular Exercise   Do you need help using the phone?  No   Are you deaf or do you have serious difficulty hearing?  No   Do you need help to go to places out of walking distance? No   Do you need help shopping? No   Do you need help preparing meals?  No   Do you need help with housework?  No   Do you need help with laundry? No   Do you need help taking your medications? No   Do you need help managing money?  No   Do you ever drive or ride in a car without wearing a seat belt? No   Have you felt unusual stress, anger or loneliness in the last month? Yes   Who do you live with? Alone   If you need help, do you have trouble finding someone available to you? No   Have you been bothered in the last four weeks by sexual problems? No   Do you have difficulty concentrating, remembering or making decisions? No       Age-appropriate Screening Schedule:  Refer to the list below for future screening recommendations based on patient's age, sex and/or medical conditions. Orders for these recommended tests are listed in the plan section. The patient has been provided with a written plan.    Health Maintenance   Topic Date Due    URINE MICROALBUMIN-CREATININE RATIO (uACR)  01/05/2022    LIPID PANEL  05/20/2025    HEMOGLOBIN A1C  08/03/2025    COVID-19 Vaccine (3 - 2024-25 season) 11/12/2025 (Originally 9/1/2024)    INFLUENZA VACCINE  07/01/2025    DIABETIC EYE EXAM  10/21/2025    ANNUAL WELLNESS VISIT  06/02/2026    DIABETIC FOOT EXAM  06/02/2026    COLORECTAL CANCER SCREENING  11/20/2027    TDAP/TD VACCINES (2 - Td or Tdap) 05/20/2034    HEPATITIS C SCREENING  Completed    Pneumococcal Vaccine 50+  Completed    ZOSTER VACCINE  Completed          CMS Preventative Services Quick Reference  Risk Factors Identified During Encounter  Fall Risk-High or Moderate: Information on Fall Prevention Shared in After Visit Summary  The above risks/problems have been discussed with the patient.  Pertinent information has been shared with the patient in the After Visit Summary.  An After Visit Summary and PPPS were made available to the patient.    Follow Up:   Next Medicare Wellness visit to be scheduled in 1 year.     Additional E&M Note during same encounter follows:  Patient has multiple medical problems which are significant and separately identifiable that require additional work above and beyond the Medicare Wellness Visit.      Chief  "Complaint:  Diabetes, blood pressure, cholesterol    Subjective    History of Present Illness:  In addition to the Medicare wellness exam, Marcelino is also here for follow-up on his usual care.  He has type 2 diabetes for which he currently takes Mounjaro 12.5 mg weekly.  He has really had no significant side effects from the injection.  His weight has not come down significantly compared to his most recent check though.  His blood sugars have been in the 130s-140s at home.  He denies any significant hypoglycemia.    He also has hypertension and elevated cholesterol for which he remains on treatments as noted.  His blood pressure is in a good range here.  He does not routinely check his blood pressure at home.    Review of Systems:  Review of Systems   Constitutional:  Negative for chills and fever.   Respiratory:  Negative for cough and shortness of breath.    Cardiovascular:  Negative for chest pain and palpitations.   Gastrointestinal:  Negative for abdominal pain, nausea and vomiting.      Objective   Vital Signs:  /71 (BP Location: Right arm, Patient Position: Sitting)   Pulse 90   Temp 97.8 °F (36.6 °C) (Oral)   Ht 172.7 cm (67.99\")   Wt 128 kg (281 lb 12.8 oz)   SpO2 95%   BMI 42.86 kg/m²     Physical Exam  Vitals and nursing note reviewed.   Constitutional:       General: He is not in acute distress.     Appearance: He is obese. He is not ill-appearing.   HENT:      Right Ear: Tympanic membrane and ear canal normal.      Left Ear: Tympanic membrane and ear canal normal.      Ears:      Comments: Hearing is normal with forced whisper bilaterally     Mouth/Throat:      Mouth: Mucous membranes are moist.      Comments: Pharynx appears normal  Eyes:      Extraocular Movements: Extraocular movements intact.      Pupils: Pupils are equal, round, and reactive to light.      Comments: Binocular vision is 20/30 with correction   Neck:      Thyroid: No thyromegaly.   Cardiovascular:      Rate and Rhythm: " Normal rate and regular rhythm.      Pulses:           Dorsalis pedis pulses are 2+ on the right side and 2+ on the left side.      Heart sounds: No murmur heard.  Pulmonary:      Effort: Pulmonary effort is normal.      Breath sounds: Normal breath sounds.   Abdominal:      General: There is no distension.      Palpations: Abdomen is soft. There is no mass.      Tenderness: There is no abdominal tenderness.   Musculoskeletal:      Cervical back: Normal range of motion.      Right lower leg: No edema.      Left lower leg: No edema.   Feet:      Right foot:      Protective Sensation: 3 sites tested.  3 sites sensed.      Skin integrity: Skin integrity normal.      Toenail Condition: Right toenails are abnormally thick.      Left foot:      Protective Sensation: 3 sites tested.  3 sites sensed.      Skin integrity: Skin integrity normal.      Toenail Condition: Left toenails are abnormally thick.   Skin:     Findings: No lesion or rash.   Neurological:      General: No focal deficit present.      Mental Status: He is oriented to person, place, and time.      Cranial Nerves: No cranial nerve deficit.      Motor: No weakness.      Coordination: Coordination normal.      Gait: Gait normal.   Psychiatric:         Mood and Affect: Mood normal.     The following data was reviewed by Velasquez Daniel MD on 06/02/2025.  Lab Results   Component Value Date    WBC 8.14 10/15/2024    HGB 13.1 10/15/2024    HCT 38.0 10/15/2024    MCV 91.8 10/15/2024     10/15/2024     Lab Results   Component Value Date    GLUCOSE 143 (H) 02/03/2025    BUN 21 02/03/2025    CREATININE 1.07 02/03/2025     02/03/2025    K 4.2 02/03/2025     02/03/2025    CALCIUM 9.8 02/03/2025    PROTEINTOT 6.4 10/15/2024    ALBUMIN 3.4 (L) 10/15/2024    ALT 22 10/15/2024    AST 11 10/15/2024    ALKPHOS 87 10/15/2024    BILITOT 0.8 10/15/2024    GLOB 3.0 10/15/2024    AGRATIO 1.1 10/15/2024    BCR 19.6 02/03/2025    ANIONGAP 10.8 02/03/2025     EGFR 74.2 02/03/2025     Lab Results   Component Value Date    CHOL 125 05/20/2024    CHLPL 136 01/05/2021    TRIG 113 05/20/2024    HDL 35 (L) 05/20/2024    LDL 69 05/20/2024     Lab Results   Component Value Date    TSH 1.600 11/20/2024     Lab Results   Component Value Date    HGBA1C 7.90 (H) 02/03/2025     Lab Results   Component Value Date    PSA 0.518 11/14/2023    PSA 0.615 11/14/2022    PSA 0.487 07/30/2021             Assessment and Plan:   Today, we have reviewed his care.  Overall, Marcelino seems well.  Regarding the Medicare wellness exam, he is due for PSA which will be obtained today.  Otherwise, he is up-to-date on recommended cancer screenings.  We also reviewed vaccines.    Regarding his usual care, his problems seem stable overall.  We will refill medications and update labs as noted below.  Tentative follow-up with me will be again in about 6 months, sooner if needed.    Diagnoses and all orders for this visit:    1. Physical exam (Primary)    2. Type 2 diabetes mellitus without complication, without long-term current use of insulin  -     Lipid Panel; Future  -     Hemoglobin A1c; Future  -     Comprehensive Metabolic Panel; Future  -     Microalbumin / Creatinine Urine Ratio - Urine, Clean Catch; Future  -     Urinalysis With Microscopic - Urine, Clean Catch; Future  -     TSH; Future    3. Morbid obesity  -     Lipid Panel; Future  -     Hemoglobin A1c; Future    4. Essential hypertension  -     Comprehensive Metabolic Panel; Future  -     amLODIPine (NORVASC) 10 MG tablet; Take 1 tablet by mouth Daily.  Dispense: 90 tablet; Refill: 3  -     carvedilol (COREG) 3.125 MG tablet; Take 1 tablet by mouth 2 (Two) Times a Day With Meals.  Dispense: 180 tablet; Refill: 3  -     Urinalysis With Microscopic - Urine, Clean Catch; Future    5. Mixed hyperlipidemia  -     Lipid Panel; Future  -     Comprehensive Metabolic Panel; Future  -     atorvastatin (LIPITOR) 10 MG tablet; Take 1 tablet by mouth Daily.   Dispense: 90 tablet; Refill: 3  -     TSH; Future    6. Primary generalized (osteo)arthritis  -     CBC (No Diff); Future  -     diclofenac (VOLTAREN) 75 MG EC tablet; Take 1 tablet by mouth 2 (Two) Times a Day With Meals.  Dispense: 180 tablet; Refill: 3    7. Other long term (current) drug therapy  -     CBC (No Diff); Future  -     Urinalysis With Microscopic - Urine, Clean Catch; Future    8. Prostate cancer screening  -     PSA Screen; Future    Other orders  -     famotidine (PEPCID) 20 MG tablet; Take 1 tablet by mouth every night at bedtime.  Dispense: 90 tablet; Refill: 3       Follow Up  Return in about 6 months (around 12/2/2025) for Recheck, Next scheduled follow up.  Patient was given instructions and counseling regarding his condition or for health maintenance advice. Please see specific information pulled into the AVS if appropriate.

## 2025-06-04 LAB — BACTERIA SPEC AEROBE CULT: ABNORMAL

## 2025-06-25 ENCOUNTER — HOSPITAL ENCOUNTER (OUTPATIENT)
Dept: ULTRASOUND IMAGING | Facility: HOSPITAL | Age: 70
Discharge: HOME | End: 2025-06-25
Admitting: FAMILY MEDICINE
Payer: MEDICARE

## 2025-06-25 DIAGNOSIS — R82.81 PYURIA: ICD-10-CM

## 2025-06-25 PROCEDURE — 76775 US EXAM ABDO BACK WALL LIM: CPT

## 2025-06-30 DIAGNOSIS — E66.01 MORBID OBESITY: ICD-10-CM

## 2025-06-30 DIAGNOSIS — E11.9 TYPE 2 DIABETES MELLITUS WITHOUT COMPLICATION, WITHOUT LONG-TERM CURRENT USE OF INSULIN: ICD-10-CM

## 2025-06-30 RX ORDER — TIRZEPATIDE 12.5 MG/.5ML
INJECTION, SOLUTION SUBCUTANEOUS
Qty: 2 ML | Refills: 0 | Status: SHIPPED | OUTPATIENT
Start: 2025-06-30

## 2025-06-30 NOTE — TELEPHONE ENCOUNTER
Pt states he has been having stomach problems for the last couple weeks, upset stomach, nausea, not sure if he should stay on this dose or go back down to the 10mg dose, states he had no issues on the 10mg dose, please advise, forwarding to on call Dr Braun.

## 2025-07-13 ENCOUNTER — RESULTS FOLLOW-UP (OUTPATIENT)
Dept: LAB | Facility: HOSPITAL | Age: 70
End: 2025-07-13
Payer: MEDICARE

## 2025-07-13 DIAGNOSIS — R82.81 PYURIA: Primary | ICD-10-CM

## 2025-07-15 ENCOUNTER — LAB (OUTPATIENT)
Dept: LAB | Facility: HOSPITAL | Age: 70
End: 2025-07-15
Payer: MEDICARE

## 2025-07-15 DIAGNOSIS — R82.81 PYURIA: ICD-10-CM

## 2025-07-15 LAB
BACTERIA UR QL AUTO: ABNORMAL /HPF
BILIRUB UR QL STRIP: NEGATIVE
CLARITY UR: CLEAR
COLOR UR: ABNORMAL
GLUCOSE UR STRIP-MCNC: NEGATIVE MG/DL
HGB UR QL STRIP.AUTO: NEGATIVE
HYALINE CASTS UR QL AUTO: ABNORMAL /LPF
KETONES UR QL STRIP: NEGATIVE
LEUKOCYTE ESTERASE UR QL STRIP.AUTO: ABNORMAL
NITRITE UR QL STRIP: NEGATIVE
PH UR STRIP.AUTO: 5.5 [PH] (ref 5–8)
PROT UR QL STRIP: NEGATIVE
RBC # UR STRIP: ABNORMAL /HPF
REF LAB TEST METHOD: ABNORMAL
SP GR UR STRIP: 1.02 (ref 1–1.03)
SQUAMOUS #/AREA URNS HPF: ABNORMAL /HPF
UROBILINOGEN UR QL STRIP: ABNORMAL
WBC # UR STRIP: ABNORMAL /HPF

## 2025-07-15 PROCEDURE — 81001 URINALYSIS AUTO W/SCOPE: CPT

## 2025-07-15 NOTE — TELEPHONE ENCOUNTER
Caller: Greg Cheek    Relationship to patient: Emergency Contact    Best call back number: [phone]    Patient is needing:     PER CALLER PATIENT WAS SEE IN UTC ON 6.28.2025 ([facility]) AND WAS GIVEN ANTIBIOTICS.    LAST URINE CULTURE STILL SHOWED E. COLI.     PATIENT FINISHED ANTIBIOTIC (CIPRO) ON 7.7.2025.    DOES DR. DEY WANT PATIENT TO HAVE UA NOW OR WAIT?    CONTACT PATIENT TO ADVISE.

## 2025-08-01 DIAGNOSIS — E66.01 MORBID OBESITY: ICD-10-CM

## 2025-08-01 DIAGNOSIS — E11.9 TYPE 2 DIABETES MELLITUS WITHOUT COMPLICATION, WITHOUT LONG-TERM CURRENT USE OF INSULIN: ICD-10-CM

## 2025-08-01 RX ORDER — TIRZEPATIDE 12.5 MG/.5ML
INJECTION, SOLUTION SUBCUTANEOUS
Qty: 2 ML | Refills: 0 | Status: SHIPPED | OUTPATIENT
Start: 2025-08-01

## 2025-08-06 LAB
MC_CV_MDC_IDC_RATE_1: 150
MC_CV_MDC_IDC_RATE_1: 171
MC_CV_MDC_IDC_THERAPIES: NORMAL
MC_CV_MDC_IDC_ZONE_ID: 2
MC_CV_MDC_IDC_ZONE_ID: 6
MDC_IDC_MSMT_BATTERY_REMAINING_LONGEVITY: 129 MO
MDC_IDC_MSMT_BATTERY_RRT_TRIGGER: 2.62
MDC_IDC_MSMT_BATTERY_STATUS: NORMAL
MDC_IDC_MSMT_BATTERY_VOLTAGE: 3.04
MDC_IDC_MSMT_LEADCHNL_RA_DTM: NORMAL
MDC_IDC_MSMT_LEADCHNL_RA_IMPEDANCE_VALUE: 437
MDC_IDC_MSMT_LEADCHNL_RA_PACING_THRESHOLD_AMPLITUDE: 0.88
MDC_IDC_MSMT_LEADCHNL_RA_PACING_THRESHOLD_POLARITY: NORMAL
MDC_IDC_MSMT_LEADCHNL_RA_PACING_THRESHOLD_PULSEWIDTH: 0.4
MDC_IDC_MSMT_LEADCHNL_RA_SENSING_INTR_AMPL: 3
MDC_IDC_MSMT_LEADCHNL_RV_DTM: NORMAL
MDC_IDC_MSMT_LEADCHNL_RV_IMPEDANCE_VALUE: 323
MDC_IDC_MSMT_LEADCHNL_RV_PACING_THRESHOLD_AMPLITUDE: 1
MDC_IDC_MSMT_LEADCHNL_RV_PACING_THRESHOLD_POLARITY: NORMAL
MDC_IDC_MSMT_LEADCHNL_RV_PACING_THRESHOLD_PULSEWIDTH: 0.4
MDC_IDC_MSMT_LEADCHNL_RV_SENSING_INTR_AMPL: 21.5
MDC_IDC_PG_IMPLANT_DTM: NORMAL
MDC_IDC_PG_MFG: NORMAL
MDC_IDC_PG_MODEL: NORMAL
MDC_IDC_PG_SERIAL: NORMAL
MDC_IDC_PG_TYPE: NORMAL
MDC_IDC_SESS_DTM: NORMAL
MDC_IDC_SESS_TYPE: NORMAL
MDC_IDC_SET_BRADY_AT_MODE_SWITCH_RATE: 171
MDC_IDC_SET_BRADY_LOWRATE: 60
MDC_IDC_SET_BRADY_MAX_SENSOR_RATE: 130
MDC_IDC_SET_BRADY_MAX_TRACKING_RATE: 130
MDC_IDC_SET_BRADY_MODE: NORMAL
MDC_IDC_SET_BRADY_PAV_DELAY: 140
MDC_IDC_SET_BRADY_SAV_DELAY: 120
MDC_IDC_SET_LEADCHNL_RA_PACING_AMPLITUDE: 1.75
MDC_IDC_SET_LEADCHNL_RA_PACING_POLARITY: NORMAL
MDC_IDC_SET_LEADCHNL_RA_PACING_PULSEWIDTH: 0.4
MDC_IDC_SET_LEADCHNL_RA_SENSING_POLARITY: NORMAL
MDC_IDC_SET_LEADCHNL_RA_SENSING_SENSITIVITY: 0.3
MDC_IDC_SET_LEADCHNL_RV_PACING_AMPLITUDE: 2
MDC_IDC_SET_LEADCHNL_RV_PACING_POLARITY: NORMAL
MDC_IDC_SET_LEADCHNL_RV_PACING_PULSEWIDTH: 0.4
MDC_IDC_SET_LEADCHNL_RV_SENSING_POLARITY: NORMAL
MDC_IDC_SET_LEADCHNL_RV_SENSING_SENSITIVITY: 0.9
MDC_IDC_SET_ZONE_STATUS: NORMAL
MDC_IDC_SET_ZONE_STATUS: NORMAL
MDC_IDC_SET_ZONE_TYPE: NORMAL
MDC_IDC_SET_ZONE_TYPE: NORMAL
MDC_IDC_STAT_AT_BURDEN_PERCENT: 0
MDC_IDC_STAT_BRADY_RA_PERCENT_PACED: 8.84
MDC_IDC_STAT_BRADY_RV_PERCENT_PACED: 90.32

## 2025-08-11 ENCOUNTER — OFFICE VISIT (OUTPATIENT)
Dept: FAMILY MEDICINE CLINIC | Age: 70
End: 2025-08-11
Payer: MEDICARE

## 2025-08-11 ENCOUNTER — HOSPITAL ENCOUNTER (OUTPATIENT)
Dept: GENERAL RADIOLOGY | Facility: HOSPITAL | Age: 70
Discharge: HOME | End: 2025-08-11
Admitting: STUDENT IN AN ORGANIZED HEALTH CARE EDUCATION/TRAINING PROGRAM
Payer: MEDICARE

## 2025-08-11 VITALS
DIASTOLIC BLOOD PRESSURE: 69 MMHG | SYSTOLIC BLOOD PRESSURE: 126 MMHG | BODY MASS INDEX: 42.13 KG/M2 | HEART RATE: 76 BPM | WEIGHT: 278 LBS | OXYGEN SATURATION: 98 % | TEMPERATURE: 97.9 F | HEIGHT: 68 IN

## 2025-08-11 DIAGNOSIS — I10 ESSENTIAL HYPERTENSION: ICD-10-CM

## 2025-08-11 DIAGNOSIS — M54.50 RIGHT-SIDED LOW BACK PAIN WITHOUT SCIATICA, UNSPECIFIED CHRONICITY: Primary | ICD-10-CM

## 2025-08-11 DIAGNOSIS — M54.50 RIGHT-SIDED LOW BACK PAIN WITHOUT SCIATICA, UNSPECIFIED CHRONICITY: ICD-10-CM

## 2025-08-11 PROCEDURE — 72100 X-RAY EXAM L-S SPINE 2/3 VWS: CPT

## 2025-08-11 RX ORDER — POLYETHYLENE GLYCOL 3350 17 G/17G
POWDER, FOR SOLUTION ORAL
COMMUNITY
Start: 2025-06-28

## 2025-08-26 DIAGNOSIS — E66.01 MORBID OBESITY: ICD-10-CM

## 2025-08-26 DIAGNOSIS — E11.9 TYPE 2 DIABETES MELLITUS WITHOUT COMPLICATION, WITHOUT LONG-TERM CURRENT USE OF INSULIN: ICD-10-CM

## 2025-08-29 RX ORDER — TIRZEPATIDE 12.5 MG/.5ML
0.5 INJECTION, SOLUTION SUBCUTANEOUS WEEKLY
Qty: 2 ML | Refills: 2 | Status: SHIPPED | OUTPATIENT
Start: 2025-08-29

## (undated) DEVICE — PINNACLE INTRODUCER SHEATH: Brand: PINNACLE

## (undated) DEVICE — CATH GUIDE RIGHTSITE C315HIS-02

## (undated) DEVICE — CATH PACE PACEL BIPOL 5F

## (undated) DEVICE — PK CATH CARD 40

## (undated) DEVICE — Device

## (undated) DEVICE — INTRO SHEATH PRELUDE SNAP .038 7F 13CM W/SDPRT

## (undated) DEVICE — STPLR SKIN VISISTAT WD 35CT

## (undated) DEVICE — DISPOSABLE ADAPTER

## (undated) DEVICE — CANN NASL CO2 TRULINK W/O2 A/

## (undated) DEVICE — TBG 02 CRUSH RESIST LF CLR 7FT

## (undated) DEVICE — SLITTER CATH GUIDE ATTAIN ADJ

## (undated) DEVICE — SNAR POLYP SENSATION STDOVL 27 240 BX40

## (undated) DEVICE — GOWN ,SIRUS,NONREINFORCED SMALL: Brand: MEDLINE

## (undated) DEVICE — TUBING, SUCTION, 1/4" X 10', STRAIGHT: Brand: MEDLINE

## (undated) DEVICE — THE PHOTONBLADE WITH ADAPTIVE SMOKE EVACUATION IS A MONOPOLAR RF DEVICE COUPLED WITH ILLUMINATION THAT IS INDICATED FOR CUTTING AND COAGULATION OF TISSUE DURING GENERAL SURGICAL PROCEDURES AND FOR REMOVING SMOKE GENERATED BY ELECTROSURGERY WHEN USED IN CONJUNCTION WITH AN EFFECTIVE SMOKE EVACUATION SYSTEM.: Brand: PHOTONBLADE WITH ADAPTIVE SMOKE EVACUATION

## (undated) DEVICE — TRY INTRO PERC 6F

## (undated) DEVICE — INTRO SHEATH PRELUDE SNAP .038 8F 13CM W/SDPRT

## (undated) DEVICE — Device: Brand: DEFENDO AIR/WATER/SUCTION AND BIOPSY VALVE

## (undated) DEVICE — THE TORRENT IRRIGATION SCOPE CONNECTOR IS USED WITH THE TORRENT IRRIGATION TUBING TO PROVIDE IRRIGATION FLUIDS SUCH AS STERILE WATER DURING GASTROINTESTINAL ENDOSCOPIC PROCEDURES WHEN USED IN CONJUNCTION WITH AN IRRIGATION PUMP (OR ELECTROSURGICAL UNIT).: Brand: TORRENT

## (undated) DEVICE — ADHS SKIN SURG TISS VISC PREMIERPRO EXOFIN HI/VISC FAST/DRY

## (undated) DEVICE — GW INQWIRE FC PTFE J/3MM .035 180